# Patient Record
Sex: MALE | Race: WHITE | Employment: OTHER | URBAN - METROPOLITAN AREA
[De-identification: names, ages, dates, MRNs, and addresses within clinical notes are randomized per-mention and may not be internally consistent; named-entity substitution may affect disease eponyms.]

---

## 2017-09-01 PROBLEM — F17.200 SMOKING: Status: ACTIVE | Noted: 2017-09-01

## 2018-04-02 ENCOUNTER — HOSPITAL ENCOUNTER (OUTPATIENT)
Dept: PHYSICAL THERAPY | Age: 68
End: 2018-04-02
Attending: INTERNAL MEDICINE

## 2019-04-02 ENCOUNTER — HOSPITAL ENCOUNTER (OUTPATIENT)
Dept: GENERAL RADIOLOGY | Age: 69
Discharge: HOME | End: 2019-04-02
Attending: INTERNAL MEDICINE
Payer: MEDICARE

## 2019-04-02 PROCEDURE — 73030 X-RAY EXAM OF SHOULDER: CPT

## 2019-08-26 PROBLEM — Z72.0 TOBACCO ABUSE: Status: ACTIVE | Noted: 2017-09-01

## 2019-08-26 PROBLEM — Z79.899 LONG-TERM USE OF HIGH-RISK MEDICATION: Status: ACTIVE | Noted: 2019-08-26

## 2019-08-26 PROBLEM — E78.5 DYSLIPIDEMIA: Status: ACTIVE | Noted: 2019-08-26

## 2019-08-27 PROBLEM — M54.50 CHRONIC BILATERAL LOW BACK PAIN WITHOUT SCIATICA: Status: ACTIVE | Noted: 2019-08-27

## 2019-08-27 PROBLEM — Z85.850 HISTORY OF THYROID CANCER: Status: ACTIVE | Noted: 2019-08-27

## 2019-08-27 PROBLEM — G89.29 CHRONIC BILATERAL LOW BACK PAIN WITHOUT SCIATICA: Status: ACTIVE | Noted: 2019-08-27

## 2019-08-28 PROBLEM — Z86.010 HISTORY OF ADENOMATOUS POLYP OF COLON: Status: ACTIVE | Noted: 2019-08-28

## 2019-08-28 PROBLEM — F33.1 MODERATE EPISODE OF RECURRENT MAJOR DEPRESSIVE DISORDER (HCC): Status: ACTIVE | Noted: 2019-08-28

## 2019-08-28 PROBLEM — R31.0 GROSS HEMATURIA: Status: ACTIVE | Noted: 2019-08-28

## 2019-09-05 PROBLEM — M51.36 DDD (DEGENERATIVE DISC DISEASE), LUMBAR: Status: ACTIVE | Noted: 2019-09-05

## 2019-09-06 ENCOUNTER — HOSPITAL ENCOUNTER (OUTPATIENT)
Dept: ULTRASOUND IMAGING | Age: 69
Discharge: HOME | End: 2019-09-06
Attending: FAMILY MEDICINE
Payer: MEDICARE

## 2019-09-06 DIAGNOSIS — Z13.6 SCREENING FOR AAA (ABDOMINAL AORTIC ANEURYSM): ICD-10-CM

## 2019-09-06 PROCEDURE — 93978 VASCULAR STUDY: CPT

## 2019-09-12 ENCOUNTER — HOSPITAL ENCOUNTER (OUTPATIENT)
Dept: MRI IMAGING | Age: 69
Discharge: HOME | End: 2019-09-12
Attending: ORTHOPAEDIC SURGERY
Payer: MEDICARE

## 2019-09-12 DIAGNOSIS — M25.511 PAIN IN RIGHT SHOULDER: ICD-10-CM

## 2019-09-12 PROCEDURE — 73221 MRI JOINT UPR EXTREM W/O DYE: CPT

## 2019-10-05 PROBLEM — F33.0 MILD EPISODE OF RECURRENT MAJOR DEPRESSIVE DISORDER (HCC): Status: ACTIVE | Noted: 2019-08-28

## 2019-11-04 ENCOUNTER — HOSPITAL ENCOUNTER (OUTPATIENT)
Dept: LAB | Age: 69
Discharge: HOME | End: 2019-11-04

## 2019-11-04 PROCEDURE — 88305 TISSUE EXAM BY PATHOLOGIST: CPT

## 2020-08-31 PROBLEM — Z79.899 LONG-TERM USE OF HIGH-RISK MEDICATION: Status: RESOLVED | Noted: 2019-08-26 | Resolved: 2020-08-31

## 2020-08-31 PROBLEM — R09.89 BRUIT OF LEFT CAROTID ARTERY: Status: ACTIVE | Noted: 2020-08-31

## 2020-08-31 PROBLEM — C67.9 MALIGNANT NEOPLASM OF URINARY BLADDER (HCC): Status: ACTIVE | Noted: 2019-11-14

## 2020-08-31 PROBLEM — R53.1 WEAKNESS GENERALIZED: Status: ACTIVE | Noted: 2020-08-31

## 2020-08-31 PROBLEM — D49.4 BLADDER TUMOR: Status: ACTIVE | Noted: 2019-11-14

## 2020-08-31 PROBLEM — R31.0 GROSS HEMATURIA: Status: RESOLVED | Noted: 2019-08-28 | Resolved: 2020-08-31

## 2021-02-21 PROBLEM — M79.642 PAIN IN BOTH HANDS: Status: ACTIVE | Noted: 2021-02-21

## 2021-02-21 PROBLEM — Z28.21 REFUSED INFLUENZA VACCINE: Status: ACTIVE | Noted: 2021-02-21

## 2021-02-21 PROBLEM — M79.641 PAIN IN BOTH HANDS: Status: ACTIVE | Noted: 2021-02-21

## 2021-02-21 PROBLEM — G89.29 CHRONIC RIGHT SHOULDER PAIN: Status: ACTIVE | Noted: 2021-02-21

## 2021-02-21 PROBLEM — M25.511 CHRONIC RIGHT SHOULDER PAIN: Status: ACTIVE | Noted: 2021-02-21

## 2021-03-17 ENCOUNTER — HOSPITAL ENCOUNTER (OUTPATIENT)
Dept: SURGERY | Age: 71
Discharge: HOME | End: 2021-03-17
Attending: ORTHOPAEDIC SURGERY
Payer: MEDICARE

## 2021-03-17 VITALS — OXYGEN SATURATION: 96 %

## 2021-03-17 LAB
ALBUMIN SERPL-MCNC: 4.1 G/DL (ref 3.2–4.6)
ALBUMIN/GLOB SERPL: 1.2 {RATIO} (ref 1.2–3.5)
ALP SERPL-CCNC: 69 U/L (ref 50–136)
ALT SERPL-CCNC: 23 U/L (ref 12–65)
ANION GAP SERPL CALC-SCNC: 1 MMOL/L (ref 7–16)
APPEARANCE UR: CLEAR
APTT PPP: 27.1 SEC (ref 24.1–35.1)
AST SERPL-CCNC: 15 U/L (ref 15–37)
BACTERIA SPEC CULT: NORMAL
BACTERIA URNS QL MICRO: 0 /HPF
BILIRUB SERPL-MCNC: 0.4 MG/DL (ref 0.2–1.1)
BILIRUB UR QL: NEGATIVE
BUN SERPL-MCNC: 16 MG/DL (ref 8–23)
CALCIUM SERPL-MCNC: 9.1 MG/DL (ref 8.3–10.4)
CASTS URNS QL MICRO: 0 /LPF
CHLORIDE SERPL-SCNC: 103 MMOL/L (ref 98–107)
CO2 SERPL-SCNC: 32 MMOL/L (ref 21–32)
COLOR UR: YELLOW
CREAT SERPL-MCNC: 0.97 MG/DL (ref 0.8–1.5)
EPI CELLS #/AREA URNS HPF: 0 /HPF
ERYTHROCYTE [DISTWIDTH] IN BLOOD BY AUTOMATED COUNT: 13.5 % (ref 11.9–14.6)
GLOBULIN SER CALC-MCNC: 3.3 G/DL (ref 2.3–3.5)
GLUCOSE SERPL-MCNC: 99 MG/DL (ref 65–100)
GLUCOSE UR STRIP.AUTO-MCNC: NEGATIVE MG/DL
HCT VFR BLD AUTO: 39.4 % (ref 41.1–50.3)
HGB BLD-MCNC: 13.2 G/DL (ref 13.6–17.2)
HGB UR QL STRIP: ABNORMAL
INR PPP: 1
KETONES UR QL STRIP.AUTO: NEGATIVE MG/DL
LEUKOCYTE ESTERASE UR QL STRIP.AUTO: NEGATIVE
MAGNESIUM SERPL-MCNC: 2.4 MG/DL (ref 1.8–2.4)
MCH RBC QN AUTO: 31.1 PG (ref 26.1–32.9)
MCHC RBC AUTO-ENTMCNC: 33.5 G/DL (ref 31.4–35)
MCV RBC AUTO: 92.7 FL (ref 79.6–97.8)
NITRITE UR QL STRIP.AUTO: NEGATIVE
NRBC # BLD: 0 K/UL (ref 0–0.2)
PH UR STRIP: 5.5 [PH] (ref 5–9)
PLATELET # BLD AUTO: 377 K/UL (ref 150–450)
PMV BLD AUTO: 8.9 FL (ref 9.4–12.3)
POTASSIUM SERPL-SCNC: 4.2 MMOL/L (ref 3.5–5.1)
PROT SERPL-MCNC: 7.4 G/DL (ref 6.3–8.2)
PROT UR STRIP-MCNC: NEGATIVE MG/DL
PROTHROMBIN TIME: 13.5 SEC (ref 12.5–14.7)
RBC # BLD AUTO: 4.25 M/UL (ref 4.23–5.6)
RBC #/AREA URNS HPF: ABNORMAL /HPF
SERVICE CMNT-IMP: NORMAL
SODIUM SERPL-SCNC: 136 MMOL/L (ref 136–145)
SP GR UR REFRACTOMETRY: 1.01 (ref 1–1.02)
UROBILINOGEN UR QL STRIP.AUTO: 0.2 EU/DL (ref 0.2–1)
WBC # BLD AUTO: 8.4 K/UL (ref 4.3–11.1)
WBC URNS QL MICRO: 0 /HPF

## 2021-03-17 PROCEDURE — 81001 URINALYSIS AUTO W/SCOPE: CPT

## 2021-03-17 PROCEDURE — 36415 COLL VENOUS BLD VENIPUNCTURE: CPT

## 2021-03-17 PROCEDURE — 85027 COMPLETE CBC AUTOMATED: CPT

## 2021-03-17 PROCEDURE — 85730 THROMBOPLASTIN TIME PARTIAL: CPT

## 2021-03-17 PROCEDURE — 83735 ASSAY OF MAGNESIUM: CPT

## 2021-03-17 PROCEDURE — 80053 COMPREHEN METABOLIC PANEL: CPT

## 2021-03-17 PROCEDURE — 85610 PROTHROMBIN TIME: CPT

## 2021-03-17 PROCEDURE — 87641 MR-STAPH DNA AMP PROBE: CPT

## 2021-03-17 RX ORDER — SODIUM CHLORIDE 0.9 % (FLUSH) 0.9 %
5-40 SYRINGE (ML) INJECTION EVERY 8 HOURS
Status: CANCELLED | OUTPATIENT
Start: 2021-03-17

## 2021-03-17 RX ORDER — SODIUM CHLORIDE 0.9 % (FLUSH) 0.9 %
5-40 SYRINGE (ML) INJECTION AS NEEDED
Status: CANCELLED | OUTPATIENT
Start: 2021-03-17

## 2021-03-17 NOTE — PERIOP NOTES
Pt did not show for 1330 appointment. Spoke with wife and she states he is near the building. Spoke with charge nurse and pt will have labs drawn today. Assessment will be done as a phone assessment.

## 2021-03-17 NOTE — PERIOP NOTES
Pt showed for 1330 appt at 1440. Informed pt that he is too late for assessment however, we will draw his pre op labs, obtain nasal swab and have RT evalate while he is here. ( Patient is also scheduled for COVID test before 4 pm today). Instructed pt that he will receive a phone call tomorrow morning from a Pre Admission testing RN. All of his medical/surgical history and medications will be reviewed. Provided pt with completed Patient Guide to Surgery, Pre Surgery Soap instructions, Incentive Spirometer w/written instructions. Informed pt that RN will review all of these written handouts with him during his phone assessment tomorrow. This RN obtained MSSA nasal swab, labeled and sent to lab. This RN escorted pt the RT office and informed RT to route pt to out patient lab next.

## 2021-03-17 NOTE — PROGRESS NOTES
03/17/21 1445   Oxygen Therapy   O2 Sat (%) 96 %   Pulse via Oximetry 61 beats per minute   O2 Device Room air   Pre-Treatment   Breath Sounds Bilateral Clear   Pre FEV1 (liters) 3.4 liters   % Predicted 121  (PT IS CURRENTSMOKER)     Initial respiratory Assessment completed with pt. Pt was interviewed and evaluated in Joint camp prior to surgery. Patient ID:  Cathy Menezes  [id number]  37 y.o.  1950  Surgeon: Dr. Floridalma Pathak  Date of Surgery: 3/25/2021  Procedure: Total Right Shoulder Arthroplasty  Primary Care Physician: Prachi Salas [phone]  Specialists:     Pt. IS SOMEWHAT PRACTICING SOCIAL DISTANCING AND WEARING A MASK WHEN IN PUBLIC.     Pt taught proper COUGH technique  DIAPHRAGMATIC BREATHING EXERCISE INSTRUCTIONS GIVEN    History of smoking:   SMOKES 1 JOINT DAILY  HX OF SMOKING 2-3 PPD BUT NOW DOWN TO (1/2 PPD- PAST 3 MONTHS) FOR PAST 62 YEARS                 Quit date:         Secondhand smoke:FATHER  SMOKING CESSATION INFO HANDED TO PT  Past procedures with Oxygen desaturation or delayed awakening:DENIES    Past Medical History:   Diagnosis Date    Arthritis     Arthritis, degenerative 9/28/2016    Chronic gastritis 9/28/2016    COPD (chronic obstructive pulmonary disease) ([facility] 9/28/2016    HTN (hypertension) 9/28/2016    Hypercholesterolemia     Hypothyroidism 9/28/2016    Long-term use of high-risk medication 8/26/2019    Osteopenia 9/28/2016    Other ill-defined conditions(799.89)     chronic back pain    Prostate cancer ([facility] 9/28/2016    stable - no recurrence - recent PSA normal    PT STATES SH DRINKS 2-3 BEERS DAILY AND SOMETIMES A WHOLE CASE  DENIES COPD  11/26/2019 CHEST CT SHOWED MILD GROUND GLASS  APPEARANCE OR ATELECTASIS IN BILATERAL LOWER LOBES  Respiratory history:DENIES SOB                                                                  Respiratory meds:  DENIES    FAMILY PRESENT:             NO                                                   PAST SLEEP STUDY:                           DENIES  HX OF LEILA:                                        DENIES  LEILA assessment:                                               SLEEPS ON    BACK           PHYSICAL EXAM   There is no height or weight on file to calculate BMI.    Visit Vitals  SpO2 (P) 96%     Neck circumference:   37   cm    Loud snoring:                                                 YES             Witnessed apnea or wakening gasping or choking:        DENIES        Awakens with headaches:                                               DENIES  Morning or daytime tiredness/ sleepiness:                      DENIES           Dry mouth or sore throat in morning:            YES                                               [place] stage:  3-4                                   SACS score:4  Stop Bang   STOP-BANG  Does the patient snore loudly (louder than talking or loud enough to be heard through closed doors)?: Yes  Does the patient often feel tired, fatigued, or sleepy during the daytime, even after a \"good\" night's sleep?: No  Has anyone ever observed the patient stop breathing during their sleep? : No  Does the patient have or are they being treated for high blood pressure?: No  Is the patient's BMI greater than 35?: No  Is your neck circumference greater than 17 inches (Male) or 16 inches (Female)?: No  Is the patient older than 48?: Yes  Is the patient male?: Yes  LEILA Score: 3  Has the patient been referred to Sleep Medicine?: No  Has the patient previously been diagnosed with Obstructive Sleep Apnea?: No                            CONTINUOUS SAT MONITOR UPON ARRIVAL TO ROOM                                        Referrals:    Pt. Phone Number:

## 2021-03-17 NOTE — ADVANCED PRACTICE NURSE
Patient late for PAT appointment. Will have RT assessment due to untreated COPD and smoking today after lab work.

## 2021-03-20 PROBLEM — M12.811 ROTATOR CUFF TEAR ARTHROPATHY OF RIGHT SHOULDER: Status: ACTIVE | Noted: 2021-03-20

## 2021-03-20 PROBLEM — M75.101 ROTATOR CUFF TEAR ARTHROPATHY OF RIGHT SHOULDER: Status: ACTIVE | Noted: 2021-03-20

## 2021-03-20 NOTE — H&P
[facility] HISTORY AND PHYSICAL    Subjective:     Patient is a 69 y.o. RHD MALE WITH RIGHT SHOULDER PAIN. SEE OFFICE NOTE. Patient Active Problem List    Diagnosis Date Noted    Rotator cuff tear arthropathy of right shoulder 03/20/2021    Refused influenza vaccine 02/21/2021    Pain in both hands 02/21/2021    Chronic right shoulder pain 02/21/2021    Bruit of left carotid artery 08/31/2020    Weakness generalized 08/31/2020    Malignant neoplasm of urinary bladder ([address] 11/14/2019    DDD (degenerative disc disease), lumbar 09/05/2019    Mild episode of recurrent major depressive disorder ([address] 08/28/2019    Chronic bilateral low back pain without sciatica 08/27/2019    History of thyroid cancer 08/27/2019    Dyslipidemia 08/26/2019    High risk medication use 08/26/2019    Tobacco abuse 09/01/2017    Chronic gastritis 09/28/2016    Prostate cancer ([address] 09/28/2016    Osteopenia 09/28/2016    Erectile dysfunction following radical prostatectomy 12/22/2015    History of adenomatous polyp of colon 04/30/2015     Past Medical History:   Diagnosis Date    Arthritis     Arthritis, degenerative 9/28/2016    Chronic gastritis 9/28/2016    COPD (chronic obstructive pulmonary disease) ([address] 9/28/2016    HTN (hypertension) 9/28/2016    Hypercholesterolemia     Hypothyroidism 9/28/2016    Long-term use of high-risk medication 8/26/2019    Osteopenia 9/28/2016    Other ill-defined conditions(799.89)     chronic back pain    Prostate cancer ([address] 9/28/2016    stable - no recurrence - recent PSA normal      Past Surgical History:   Procedure Laterality Date    HX HERNIA REPAIR      HX PARTIAL THYROIDECTOMY Left     HX PROSTATECTOMY        Prior to Admission medications    Medication Sig Start Date End Date Taking? Authorizing Provider   oxybutynin (DITROPAN) 5 mg tablet Take 5 mg by mouth as needed for Hemostasis.  12/29/20   Provider, Historical   loratadine (CLARITIN) 10 mg tablet Take 10 mg by mouth daily. Provider, Historical   celecoxib (CELEBREX) 200 mg capsule Take 1 Cap by mouth daily. 2/9/21   Angel Mason DO   pantoprazole (Protonix) 40 mg tablet Take 1 Tab by mouth daily. Indications: gastroesophageal reflux disease 8/31/20   Jarold Severe P, DO   atorvastatin (LIPITOR) 10 mg tablet Take 1 Tab by mouth daily. 8/31/20   Angel Mason DO     No Known Allergies   Social History     Tobacco Use    Smoking status: Current Every Day Smoker     Packs/day: 0.50     Types: Cigarettes     Start date: 1/1/1963    Smokeless tobacco: Never Used   Substance Use Topics    Alcohol use: Yes     Alcohol/week: 14.0 standard drinks     Types: 14 Cans of beer per week     Frequency: 4 or more times a week     Drinks per session: 1 or 2     Binge frequency: Never      Family History   Problem Relation Age of Onset    Cancer Brother       Review of Systems  A comprehensive review of systems was negative except for that written in the HPI. Objective:     No data found. There were no vitals taken for this visit. General:  Alert, cooperative, no distress, appears stated age. Head:  Normocephalic, without obvious abnormality, atraumatic. Back:   Symmetric, no curvature. ROM normal. No CVA tenderness. Lungs:   Clear to auscultation bilaterally. Chest wall:  No tenderness or deformity. Heart:  Regular rate and rhythm, S1, S2 normal, no murmur, click, rub or gallop. Extremities: Extremities normal, atraumatic, no cyanosis or edema. Pulses: 2+ and symmetric all extremities. Skin: Skin color, texture, turgor normal. No rashes or lesions   Lymph nodes: Cervical, supraclavicular, and axillary nodes normal.   Neurologic: CNII-XII intact. Normal strength, sensation and reflexes throughout.            Assessment:     Principal Problem:    Rotator cuff tear arthropathy of right shoulder (3/20/2021)        Plan:     The various methods of treatment have been discussed with the patient and family. PATIENT HAS EXHAUSTED NON-OPERATIVE MODALITIES     After consideration of risks, benefits and other options for treatment, the patient has consented to surgical intervention.     SEE OFFICE NOTE    Lorice Kocher., MD

## 2021-03-22 NOTE — ADVANCED PRACTICE NURSE
Total Joint Surgery Preoperative Chart Review      Patient ID:  Brody Juarez  [id number]  67 y.o.  1950  Surgeon: Dr. Rosario Spearing  Date of Surgery: 3/25/2021  Procedure: Total Right Shoulder Arthroplasty  Primary Care Physician: Morelia Shay, [place] [phone]  Specialty Physician(s):      Subjective:   Brody Juarez is a 69 y.o. WHITE male who presents for preoperative evaluation for Total Right Shoulder arthroplasty. This is a preoperative chart review note based on data collected by the nurse at the surgical Pre-Assessment visit. Past Medical History:   Diagnosis Date    Arthritis     Arthritis, degenerative 9/28/2016    Chronic gastritis 9/28/2016    COPD (chronic obstructive pulmonary disease) ([facility] 9/28/2016    HTN (hypertension) 9/28/2016    Hypercholesterolemia     Hypothyroidism 9/28/2016    Long-term use of high-risk medication 8/26/2019    Osteopenia 9/28/2016    Other ill-defined conditions(799.89)     chronic back pain    Prostate cancer ([facility] [address] 9/28/2016    stable - no recurrence - recent PSA normal      Past Surgical History:   Procedure Laterality Date    HX HERNIA REPAIR      HX PARTIAL THYROIDECTOMY Left     HX PROSTATECTOMY       Family History   Problem Relation Age of Onset    Cancer Brother       Social History     Tobacco Use    Smoking status: Current Every Day Smoker     Packs/day: 0.50     Types: Cigarettes     Start date: 1/1/1963    Smokeless tobacco: Never Used   Substance Use Topics    Alcohol use: Yes     Alcohol/week: 14.0 standard drinks     Types: 14 Cans of beer per week     Frequency: 4 or more times a week     Drinks per session: 1 or 2     Binge frequency: Never       Prior to Admission medications    Medication Sig Start Date End Date Taking? Authorizing Provider   oxybutynin (DITROPAN) 5 mg tablet Take 5 mg by mouth as needed for Hemostasis. 12/29/20   Provider, Historical   loratadine (CLARITIN) 10 mg tablet Take 10 mg by mouth daily. Provider, Historical   celecoxib (CELEBREX) 200 mg capsule Take 1 Cap by mouth daily. 2/9/21   [place] Ill, DO   pantoprazole (Protonix) 40 mg tablet Take 1 Tab by mouth daily. Indications: gastroesophageal reflux disease 8/31/20   Camilla Bryson P, DO   atorvastatin (LIPITOR) 10 mg tablet Take 1 Tab by mouth daily. 8/31/20   [place] Ill, DO     No Known Allergies       Objective:     Physical Exam:   No data found. ECG:    EKG Results     None          Data Review:   Labs:   Labs reviewed    Problem List:  )  Patient Active Problem List   Diagnosis Code    Chronic gastritis K29.50    Pro[facility] ([facility] C61    Osteopenia M85.80    Tobacco abuse Z72.0    Dyslipidemia E78.5    High risk medication use Z79.899    Chronic bilateral low back pain without sciatica M54.5, G89.29    History of thyroid cancer Z85.850    History of adenomatous polyp of colon Z86.010    Mild episode of recurrent major depressive disorder ([facility] F33.0    DDD (degenerative disc disease), lumbar M51.36    Erectile dysfunction following radical prostatectomy N52.31    Malignant neoplasm of urinary bladder ([facility]) C67.9    Bruit of left carotid artery R09.89    Weakness generalized R53.1    Refused influenza vaccine Z28.21    Pain in both hands M79.641, M79.642    Chronic right shoulder pain M25.511, G89.29    Rotator cuff tear arthropathy of right shoulder M75.101, M12.811       Total Joint Surgery Pre-Assessment Recommendations:           Recommend continuous saturation monitoring during hospitalization. PEP therapy BID. Albuterol every 6 hours as need during hospitalization.      Signed By: MEL Walls    March 22, 2021

## 2021-03-24 ENCOUNTER — ANESTHESIA EVENT (OUTPATIENT)
Dept: SURGERY | Age: 71
End: 2021-03-24
Payer: MEDICARE

## 2021-03-24 ENCOUNTER — HOSPITAL ENCOUNTER (OUTPATIENT)
Dept: LAB | Age: 71
Discharge: HOME | End: 2021-03-24
Payer: MEDICARE

## 2021-03-24 PROCEDURE — 86901 BLOOD TYPING SEROLOGIC RH(D): CPT

## 2021-03-24 PROCEDURE — 36415 COLL VENOUS BLD VENIPUNCTURE: CPT

## 2021-03-24 PROCEDURE — 86923 COMPATIBILITY TEST ELECTRIC: CPT

## 2021-03-25 ENCOUNTER — ANESTHESIA (OUTPATIENT)
Dept: SURGERY | Age: 71
End: 2021-03-25
Payer: MEDICARE

## 2021-03-25 ENCOUNTER — HOSPITAL ENCOUNTER (OUTPATIENT)
Age: 71
Setting detail: OBSERVATION
Discharge: HOME | End: 2021-03-26
Attending: ORTHOPAEDIC SURGERY | Admitting: ORTHOPAEDIC SURGERY
Payer: MEDICARE

## 2021-03-25 ENCOUNTER — APPOINTMENT (OUTPATIENT)
Dept: GENERAL RADIOLOGY | Age: 71
End: 2021-03-25
Attending: ORTHOPAEDIC SURGERY
Payer: MEDICARE

## 2021-03-25 DIAGNOSIS — M75.101 ROTATOR CUFF TEAR ARTHROPATHY OF RIGHT SHOULDER: ICD-10-CM

## 2021-03-25 DIAGNOSIS — M12.811 ROTATOR CUFF TEAR ARTHROPATHY OF RIGHT SHOULDER: ICD-10-CM

## 2021-03-25 LAB
EST. AVERAGE GLUCOSE BLD GHB EST-MCNC: 114 MG/DL
GLUCOSE BLD STRIP.AUTO-MCNC: 98 MG/DL (ref 65–100)
HBA1C MFR BLD: 5.6 % (ref 4.2–6.3)
HISTORY CHECKED?,CKHIST: NORMAL
SERVICE CMNT-IMP: NORMAL

## 2021-03-25 PROCEDURE — 77030004434 HC BUR RND STRY -B: Performed by: ORTHOPAEDIC SURGERY

## 2021-03-25 PROCEDURE — 77030003827 HC BIT DRL J&J -B: Performed by: ORTHOPAEDIC SURGERY

## 2021-03-25 PROCEDURE — 76010010054 HC POST OP PAIN BLOCK: Performed by: ORTHOPAEDIC SURGERY

## 2021-03-25 PROCEDURE — 77030002986 HC SUT PROL J&J -A: Performed by: ORTHOPAEDIC SURGERY

## 2021-03-25 PROCEDURE — 2709999900 HC NON-CHARGEABLE SUPPLY

## 2021-03-25 PROCEDURE — C1776 JOINT DEVICE (IMPLANTABLE): HCPCS | Performed by: ORTHOPAEDIC SURGERY

## 2021-03-25 PROCEDURE — 74011250636 HC RX REV CODE- 250/636: Performed by: ANESTHESIOLOGY

## 2021-03-25 PROCEDURE — 82962 GLUCOSE BLOOD TEST: CPT

## 2021-03-25 PROCEDURE — 77030037088 HC TUBE ENDOTRACH ORAL NSL COVD-A: Performed by: ANESTHESIOLOGY

## 2021-03-25 PROCEDURE — 77030012547: Performed by: ORTHOPAEDIC SURGERY

## 2021-03-25 PROCEDURE — 76060000034 HC ANESTHESIA 1.5 TO 2 HR: Performed by: ORTHOPAEDIC SURGERY

## 2021-03-25 PROCEDURE — 74011250636 HC RX REV CODE- 250/636: Performed by: NURSE ANESTHETIST, CERTIFIED REGISTERED

## 2021-03-25 PROCEDURE — 99218 HC RM OBSERVATION: CPT

## 2021-03-25 PROCEDURE — 74011250636 HC RX REV CODE- 250/636

## 2021-03-25 PROCEDURE — 23472 RECONSTRUCT SHOULDER JOINT: CPT | Performed by: ORTHOPAEDIC SURGERY

## 2021-03-25 PROCEDURE — 77030020268 HC MISC GENERAL SUPPLY: Performed by: ORTHOPAEDIC SURGERY

## 2021-03-25 PROCEDURE — 76210000006 HC OR PH I REC 0.5 TO 1 HR: Performed by: ORTHOPAEDIC SURGERY

## 2021-03-25 PROCEDURE — 77030039425 HC BLD LARYNG TRULITE DISP TELE -A: Performed by: ANESTHESIOLOGY

## 2021-03-25 PROCEDURE — C1713 ANCHOR/SCREW BN/BN,TIS/BN: HCPCS | Performed by: ORTHOPAEDIC SURGERY

## 2021-03-25 PROCEDURE — 94762 N-INVAS EAR/PLS OXIMTRY CONT: CPT

## 2021-03-25 PROCEDURE — 76942 ECHO GUIDE FOR BIOPSY: CPT | Performed by: ORTHOPAEDIC SURGERY

## 2021-03-25 PROCEDURE — 77030003602 HC NDL NRV BLK BBMI -B: Performed by: ANESTHESIOLOGY

## 2021-03-25 PROCEDURE — 73030 X-RAY EXAM OF SHOULDER: CPT

## 2021-03-25 PROCEDURE — 77030035257 HC SUT FORC FBR STRND STRY -B: Performed by: ORTHOPAEDIC SURGERY

## 2021-03-25 PROCEDURE — 77030040922 HC BLNKT HYPOTHRM STRY -A: Performed by: ANESTHESIOLOGY

## 2021-03-25 PROCEDURE — 77030035643 HC BLD SAW OSC PRECIS STRY -C: Performed by: ORTHOPAEDIC SURGERY

## 2021-03-25 PROCEDURE — 76010000162 HC OR TIME 1.5 TO 2 HR INTENSV-TIER 1: Performed by: ORTHOPAEDIC SURGERY

## 2021-03-25 PROCEDURE — 83036 HEMOGLOBIN GLYCOSYLATED A1C: CPT

## 2021-03-25 PROCEDURE — 77030031139 HC SUT VCRL2 J&J -A: Performed by: ORTHOPAEDIC SURGERY

## 2021-03-25 PROCEDURE — 77030011283 HC ELECTRD NDL COVD -A: Performed by: ORTHOPAEDIC SURGERY

## 2021-03-25 PROCEDURE — 2709999900 HC NON-CHARGEABLE SUPPLY: Performed by: ORTHOPAEDIC SURGERY

## 2021-03-25 PROCEDURE — 23397 MUSCLE TRANSFERS: CPT | Performed by: ORTHOPAEDIC SURGERY

## 2021-03-25 PROCEDURE — 74011000250 HC RX REV CODE- 250: Performed by: NURSE ANESTHETIST, CERTIFIED REGISTERED

## 2021-03-25 PROCEDURE — 74011250637 HC RX REV CODE- 250/637: Performed by: ORTHOPAEDIC SURGERY

## 2021-03-25 PROCEDURE — 74011000250 HC RX REV CODE- 250

## 2021-03-25 PROCEDURE — 77030002913 HC SUT ETHBND J&J -B: Performed by: ORTHOPAEDIC SURGERY

## 2021-03-25 PROCEDURE — 77030002937 HC SUT MERS J&J -B: Performed by: ORTHOPAEDIC SURGERY

## 2021-03-25 DEVICE — COMPONENT GLEN FIX DIA10MM SHLDR METAGLENE LNG PEG GLOB: Type: IMPLANTABLE DEVICE | Site: SHOULDER | Status: FUNCTIONAL

## 2021-03-25 DEVICE — Z DUP USE 2513000 COMPONENT GLENOSPHERE ECC XTEND 38MM + 4MM: Type: IMPLANTABLE DEVICE | Site: SHOULDER | Status: FUNCTIONAL

## 2021-03-25 DEVICE — RESTRICTOR CEM DIA12MM UNIV FEM CNL UHMWPE BIOSTP G: Type: IMPLANTABLE DEVICE | Site: SHOULDER | Status: FUNCTIONAL

## 2021-03-25 DEVICE — CEMENT BNE 20ML 41GM FULL DOSE PMMA W/ TOBRA M VISC RADPQ: Type: IMPLANTABLE DEVICE | Site: SHOULDER | Status: FUNCTIONAL

## 2021-03-25 DEVICE — SPACER HUM +9MM OFFSET SHLDR POLYETH FOR DELT XTEND REV SYS
Type: IMPLANTABLE DEVICE | Site: SHOULDER | Status: NON-FUNCTIONAL
Removed: 2021-09-09

## 2021-03-25 DEVICE — CUP HUM DIA38MM +3MM OFFSET STD SHLDR POLYETH DELT XTEND: Type: IMPLANTABLE DEVICE | Site: SHOULDER | Status: FUNCTIONAL

## 2021-03-25 DEVICE — SCREW BNE L26MM DIA4.5MM PROX CORT TIB S STL ST LOK FULL: Type: IMPLANTABLE DEVICE | Site: SHOULDER | Status: FUNCTIONAL

## 2021-03-25 DEVICE — SCREW BNE L36MM DIA4.5MM PROX CORT TIB S STL ST LOK FULL: Type: IMPLANTABLE DEVICE | Site: SHOULDER | Status: FUNCTIONAL

## 2021-03-25 DEVICE — STEM HUM SZ 2 DIA12MM 155DEG SHLDR CO CHROM ALLY STD LEN: Type: IMPLANTABLE DEVICE | Site: SHOULDER | Status: FUNCTIONAL

## 2021-03-25 DEVICE — SCREW BNE L42MM DIA4.5MM PROX CORT TIB S STL ST LOK FULL: Type: IMPLANTABLE DEVICE | Site: SHOULDER | Status: FUNCTIONAL

## 2021-03-25 RX ORDER — PANTOPRAZOLE SODIUM 40 MG/1
40 TABLET, DELAYED RELEASE ORAL DAILY
Status: DISCONTINUED | OUTPATIENT
Start: 2021-03-26 | End: 2021-03-26 | Stop reason: HOSPADM

## 2021-03-25 RX ORDER — ONDANSETRON 2 MG/ML
4 INJECTION INTRAMUSCULAR; INTRAVENOUS ONCE
Status: DISCONTINUED | OUTPATIENT
Start: 2021-03-25 | End: 2021-03-25 | Stop reason: HOSPADM

## 2021-03-25 RX ORDER — LIDOCAINE HYDROCHLORIDE 10 MG/ML
0.1 INJECTION INFILTRATION; PERINEURAL AS NEEDED
Status: DISCONTINUED | OUTPATIENT
Start: 2021-03-25 | End: 2021-03-25 | Stop reason: HOSPADM

## 2021-03-25 RX ORDER — HYDROMORPHONE HYDROCHLORIDE 2 MG/1
2 TABLET ORAL
Status: DISCONTINUED | OUTPATIENT
Start: 2021-03-25 | End: 2021-03-26 | Stop reason: HOSPADM

## 2021-03-25 RX ORDER — OXYCODONE HYDROCHLORIDE 5 MG/1
5 TABLET ORAL
Status: DISCONTINUED | OUTPATIENT
Start: 2021-03-25 | End: 2021-03-25 | Stop reason: HOSPADM

## 2021-03-25 RX ORDER — SODIUM CHLORIDE 0.9 % (FLUSH) 0.9 %
5-40 SYRINGE (ML) INJECTION EVERY 8 HOURS
Status: DISCONTINUED | OUTPATIENT
Start: 2021-03-25 | End: 2021-03-26 | Stop reason: HOSPADM

## 2021-03-25 RX ORDER — ONDANSETRON 2 MG/ML
INJECTION INTRAMUSCULAR; INTRAVENOUS AS NEEDED
Status: DISCONTINUED | OUTPATIENT
Start: 2021-03-25 | End: 2021-03-25 | Stop reason: HOSPADM

## 2021-03-25 RX ORDER — CEFAZOLIN SODIUM/WATER 2 G/20 ML
SYRINGE (ML) INTRAVENOUS AS NEEDED
Status: DISCONTINUED | OUTPATIENT
Start: 2021-03-25 | End: 2021-03-25 | Stop reason: HOSPADM

## 2021-03-25 RX ORDER — HYDROMORPHONE HYDROCHLORIDE 2 MG/1
4 TABLET ORAL
Status: DISCONTINUED | OUTPATIENT
Start: 2021-03-25 | End: 2021-03-26 | Stop reason: HOSPADM

## 2021-03-25 RX ORDER — ALBUTEROL SULFATE 0.83 MG/ML
2.5 SOLUTION RESPIRATORY (INHALATION) AS NEEDED
Status: DISCONTINUED | OUTPATIENT
Start: 2021-03-25 | End: 2021-03-25 | Stop reason: HOSPADM

## 2021-03-25 RX ORDER — HYDROMORPHONE HYDROCHLORIDE 1 MG/ML
1 INJECTION, SOLUTION INTRAMUSCULAR; INTRAVENOUS; SUBCUTANEOUS
Status: DISCONTINUED | OUTPATIENT
Start: 2021-03-25 | End: 2021-03-26 | Stop reason: HOSPADM

## 2021-03-25 RX ORDER — NEOSTIGMINE METHYLSULFATE 1 MG/ML
INJECTION, SOLUTION INTRAVENOUS AS NEEDED
Status: DISCONTINUED | OUTPATIENT
Start: 2021-03-25 | End: 2021-03-25 | Stop reason: HOSPADM

## 2021-03-25 RX ORDER — SODIUM CHLORIDE 0.9 % (FLUSH) 0.9 %
5-40 SYRINGE (ML) INJECTION EVERY 8 HOURS
Status: DISCONTINUED | OUTPATIENT
Start: 2021-03-25 | End: 2021-03-25 | Stop reason: HOSPADM

## 2021-03-25 RX ORDER — ATORVASTATIN CALCIUM 10 MG/1
10 TABLET, FILM COATED ORAL DAILY
Status: DISCONTINUED | OUTPATIENT
Start: 2021-03-26 | End: 2021-03-26 | Stop reason: HOSPADM

## 2021-03-25 RX ORDER — DEXAMETHASONE SODIUM PHOSPHATE 4 MG/ML
INJECTION, SOLUTION INTRA-ARTICULAR; INTRALESIONAL; INTRAMUSCULAR; INTRAVENOUS; SOFT TISSUE AS NEEDED
Status: DISCONTINUED | OUTPATIENT
Start: 2021-03-25 | End: 2021-03-25 | Stop reason: HOSPADM

## 2021-03-25 RX ORDER — ALBUTEROL SULFATE 0.83 MG/ML
2.5 SOLUTION RESPIRATORY (INHALATION)
Status: DISCONTINUED | OUTPATIENT
Start: 2021-03-25 | End: 2021-03-26 | Stop reason: HOSPADM

## 2021-03-25 RX ORDER — ROPIVACAINE HYDROCHLORIDE 5 MG/ML
INJECTION, SOLUTION EPIDURAL; INFILTRATION; PERINEURAL AS NEEDED
Status: DISCONTINUED | OUTPATIENT
Start: 2021-03-25 | End: 2021-03-25 | Stop reason: HOSPADM

## 2021-03-25 RX ORDER — MIDAZOLAM HYDROCHLORIDE 1 MG/ML
2 INJECTION, SOLUTION INTRAMUSCULAR; INTRAVENOUS
Status: DISCONTINUED | OUTPATIENT
Start: 2021-03-25 | End: 2021-03-25 | Stop reason: HOSPADM

## 2021-03-25 RX ORDER — FACIAL-BODY WIPES
10 EACH TOPICAL DAILY PRN
Status: DISCONTINUED | OUTPATIENT
Start: 2021-03-25 | End: 2021-03-26 | Stop reason: HOSPADM

## 2021-03-25 RX ORDER — SODIUM CHLORIDE, SODIUM LACTATE, POTASSIUM CHLORIDE, CALCIUM CHLORIDE 600; 310; 30; 20 MG/100ML; MG/100ML; MG/100ML; MG/100ML
100 INJECTION, SOLUTION INTRAVENOUS CONTINUOUS
Status: DISCONTINUED | OUTPATIENT
Start: 2021-03-25 | End: 2021-03-25 | Stop reason: HOSPADM

## 2021-03-25 RX ORDER — PROPOFOL 10 MG/ML
INJECTION, EMULSION INTRAVENOUS AS NEEDED
Status: DISCONTINUED | OUTPATIENT
Start: 2021-03-25 | End: 2021-03-25 | Stop reason: HOSPADM

## 2021-03-25 RX ORDER — DOCUSATE SODIUM 100 MG/1
100 CAPSULE, LIQUID FILLED ORAL 2 TIMES DAILY
Status: DISCONTINUED | OUTPATIENT
Start: 2021-03-26 | End: 2021-03-26 | Stop reason: HOSPADM

## 2021-03-25 RX ORDER — FENTANYL CITRATE 50 UG/ML
100 INJECTION, SOLUTION INTRAMUSCULAR; INTRAVENOUS ONCE
Status: DISCONTINUED | OUTPATIENT
Start: 2021-03-25 | End: 2021-03-25 | Stop reason: HOSPADM

## 2021-03-25 RX ORDER — ROCURONIUM BROMIDE 10 MG/ML
INJECTION, SOLUTION INTRAVENOUS AS NEEDED
Status: DISCONTINUED | OUTPATIENT
Start: 2021-03-25 | End: 2021-03-25 | Stop reason: HOSPADM

## 2021-03-25 RX ORDER — NALOXONE HYDROCHLORIDE 0.4 MG/ML
0.1 INJECTION, SOLUTION INTRAMUSCULAR; INTRAVENOUS; SUBCUTANEOUS AS NEEDED
Status: DISCONTINUED | OUTPATIENT
Start: 2021-03-25 | End: 2021-03-25 | Stop reason: HOSPADM

## 2021-03-25 RX ORDER — PROMETHAZINE HYDROCHLORIDE 25 MG/1
25 TABLET ORAL
Status: DISCONTINUED | OUTPATIENT
Start: 2021-03-25 | End: 2021-03-26 | Stop reason: HOSPADM

## 2021-03-25 RX ORDER — MIDAZOLAM HYDROCHLORIDE 1 MG/ML
2 INJECTION, SOLUTION INTRAMUSCULAR; INTRAVENOUS ONCE
Status: COMPLETED | OUTPATIENT
Start: 2021-03-25 | End: 2021-03-25

## 2021-03-25 RX ORDER — LANOLIN ALCOHOL/MO/W.PET/CERES
1 CREAM (GRAM) TOPICAL 2 TIMES DAILY WITH MEALS
Status: DISCONTINUED | OUTPATIENT
Start: 2021-03-26 | End: 2021-03-26 | Stop reason: HOSPADM

## 2021-03-25 RX ORDER — SODIUM CHLORIDE 0.9 % (FLUSH) 0.9 %
5-40 SYRINGE (ML) INJECTION AS NEEDED
Status: DISCONTINUED | OUTPATIENT
Start: 2021-03-25 | End: 2021-03-25 | Stop reason: HOSPADM

## 2021-03-25 RX ORDER — HYDROMORPHONE HYDROCHLORIDE 2 MG/ML
0.5 INJECTION, SOLUTION INTRAMUSCULAR; INTRAVENOUS; SUBCUTANEOUS
Status: DISCONTINUED | OUTPATIENT
Start: 2021-03-25 | End: 2021-03-25 | Stop reason: HOSPADM

## 2021-03-25 RX ORDER — LIDOCAINE HYDROCHLORIDE 20 MG/ML
INJECTION, SOLUTION EPIDURAL; INFILTRATION; INTRACAUDAL; PERINEURAL AS NEEDED
Status: DISCONTINUED | OUTPATIENT
Start: 2021-03-25 | End: 2021-03-25 | Stop reason: HOSPADM

## 2021-03-25 RX ORDER — DIPHENHYDRAMINE HYDROCHLORIDE 50 MG/ML
12.5 INJECTION, SOLUTION INTRAMUSCULAR; INTRAVENOUS
Status: DISCONTINUED | OUTPATIENT
Start: 2021-03-25 | End: 2021-03-25 | Stop reason: HOSPADM

## 2021-03-25 RX ORDER — OXYCODONE HYDROCHLORIDE 5 MG/1
10 TABLET ORAL
Status: DISCONTINUED | OUTPATIENT
Start: 2021-03-25 | End: 2021-03-25 | Stop reason: HOSPADM

## 2021-03-25 RX ORDER — GLYCOPYRROLATE 0.2 MG/ML
INJECTION INTRAMUSCULAR; INTRAVENOUS AS NEEDED
Status: DISCONTINUED | OUTPATIENT
Start: 2021-03-25 | End: 2021-03-25 | Stop reason: HOSPADM

## 2021-03-25 RX ORDER — SODIUM CHLORIDE 9 MG/ML
75 INJECTION, SOLUTION INTRAVENOUS CONTINUOUS
Status: DISCONTINUED | OUTPATIENT
Start: 2021-03-25 | End: 2021-03-26 | Stop reason: HOSPADM

## 2021-03-25 RX ORDER — SODIUM CHLORIDE 0.9 % (FLUSH) 0.9 %
5-40 SYRINGE (ML) INJECTION AS NEEDED
Status: DISCONTINUED | OUTPATIENT
Start: 2021-03-25 | End: 2021-03-26 | Stop reason: HOSPADM

## 2021-03-25 RX ORDER — CEFAZOLIN SODIUM/WATER 2 G/20 ML
2 SYRINGE (ML) INTRAVENOUS ONCE
Status: COMPLETED | OUTPATIENT
Start: 2021-03-25 | End: 2021-03-25

## 2021-03-25 RX ADMIN — DEXAMETHASONE SODIUM PHOSPHATE 10 MG: 4 INJECTION, SOLUTION INTRAMUSCULAR; INTRAVENOUS at 17:13

## 2021-03-25 RX ADMIN — HYDROMORPHONE HYDROCHLORIDE 0.5 MG: 2 INJECTION INTRAMUSCULAR; INTRAVENOUS; SUBCUTANEOUS at 19:06

## 2021-03-25 RX ADMIN — CEFAZOLIN 2 G: 1 INJECTION, POWDER, FOR SOLUTION INTRAVENOUS at 16:55

## 2021-03-25 RX ADMIN — ROPIVACAINE HYDROCHLORIDE 30 ML: 5 INJECTION, SOLUTION EPIDURAL; INFILTRATION; PERINEURAL at 15:09

## 2021-03-25 RX ADMIN — ROCURONIUM BROMIDE 40 MG: 10 INJECTION, SOLUTION INTRAVENOUS at 17:03

## 2021-03-25 RX ADMIN — SODIUM CHLORIDE, SODIUM LACTATE, POTASSIUM CHLORIDE, AND CALCIUM CHLORIDE 100 ML/HR: 600; 310; 30; 20 INJECTION, SOLUTION INTRAVENOUS at 10:56

## 2021-03-25 RX ADMIN — HYDROMORPHONE HYDROCHLORIDE 2 MG: 2 TABLET ORAL at 21:38

## 2021-03-25 RX ADMIN — ONDANSETRON 4 MG: 2 INJECTION INTRAMUSCULAR; INTRAVENOUS at 17:13

## 2021-03-25 RX ADMIN — MIDAZOLAM HYDROCHLORIDE 2 MG: 1 INJECTION, SOLUTION INTRAMUSCULAR; INTRAVENOUS at 15:05

## 2021-03-25 RX ADMIN — SODIUM CHLORIDE, SODIUM LACTATE, POTASSIUM CHLORIDE, AND CALCIUM CHLORIDE: 600; 310; 30; 20 INJECTION, SOLUTION INTRAVENOUS at 18:34

## 2021-03-25 RX ADMIN — CEFAZOLIN SODIUM 2 G: 100 INJECTION, POWDER, LYOPHILIZED, FOR SOLUTION INTRAVENOUS at 10:51

## 2021-03-25 RX ADMIN — LIDOCAINE HYDROCHLORIDE 60 MG: 20 INJECTION, SOLUTION EPIDURAL; INFILTRATION; INTRACAUDAL; PERINEURAL at 17:03

## 2021-03-25 RX ADMIN — GLYCOPYRROLATE 0.4 MG: 0.2 INJECTION, SOLUTION INTRAMUSCULAR; INTRAVENOUS at 18:40

## 2021-03-25 RX ADMIN — Medication 3 MG: at 18:40

## 2021-03-25 RX ADMIN — PROPOFOL 150 MG: 10 INJECTION, EMULSION INTRAVENOUS at 17:03

## 2021-03-25 NOTE — ANESTHESIA POSTPROCEDURE EVALUATION
Procedure(s):   Procedure(s): REVERSE RIGHT TOTAL SHOULDER ARTHROPLASTY WITH DELTA EXTEND PROSTHESIS, LATISSIMUS DORSI AND TERES MAJOR TENDON TRANSFERS   Surgeon(s) and Role:.    general    Anesthesia Post Evaluation      Multimodal analgesia: multimodal analgesia used between 6 hours prior to anesthesia start to PACU discharge  Patient location during evaluation: PACU  Patient participation: complete - patient participated  Level of consciousness: awake and alert  Pain management: adequate  Airway patency: patent  Anesthetic complications: no  Cardiovascular status: acceptable and hemodynamically stable  Respiratory status: acceptable  Hydration status: acceptable  Post anesthesia nausea and vomiting:  none  Final Post Anesthesia Temperature Assessment:  Normothermia (36.0-37.5 degrees C)      INITIAL Post-op Vital signs:   Vitals Value Taken Time   /81 03/25/21 1923   Temp 36.3 °C (97.3 °F) 03/25/21 1853   Pulse 71 03/25/21 1923   Resp 16 03/25/21 1923   SpO2 93 % 03/25/21 1923

## 2021-03-25 NOTE — ANESTHESIA PREPROCEDURE EVALUATION
Relevant Problems   No relevant active problems       Anesthetic History   No history of anesthetic complications            Review of Systems / Medical History  Patient summary reviewed, nursing notes reviewed and pertinent labs reviewed    Pulmonary    COPD: moderate               Neuro/Psych   Within defined limits           Cardiovascular                Pertinent negatives: No hypertension  Exercise tolerance: >4 METS     GI/Hepatic/Renal     GERD: well controlled           Endo/Other          Pertinent negatives: No hypothyroidism   Other Findings            Physical Exam    Airway  Mallampati: II           Cardiovascular               Dental    Dentition: Poor dentition     Pulmonary                 Abdominal         Other Findings            Anesthetic Plan    ASA: 2  Anesthesia type: general      Post-op pain plan if not by surgeon: peripheral nerve block single    Induction: Intravenous  Anesthetic plan and risks discussed with: Patient

## 2021-03-25 NOTE — PERIOP NOTES
TRANSFER - OUT REPORT:    Verbal report given to Zo(name) on Bernardo Kong  being transferred to [facility](unit) for routine post - op       Report consisted of patients Situation, Background, Assessment and   Recommendations(SBAR). Information from the following report(s) SBAR, OR Summary and MAR was reviewed with the receiving nurse. Lines:   Peripheral IV 03/25/21 Left Hand (Active)        Opportunity for questions and clarification was provided.       Patient transported with:

## 2021-03-25 NOTE — H&P
Date of Surgery Update:  Tamar Barnett was seen and examined. History and physical has been reviewed. The patient has been examined.  There have been no significant clinical changes since the completion of the originally dated History and Physical.    Signed By: Celeste Andersen MD     March 25, 2021 10:39 AM

## 2021-03-25 NOTE — H&P
[address] [place] [address]    Name: Ruslan Fajardo  YOB: 1950  Gender: male  MRN: [id number]  Handedness: Right    What: Bilateral shoulder pain  How: Insidious onset      HPI: Ruslan Fajardo is a 69 y.o. male right hand dominant gentleman who is established with the practice seen for problems due to both shoulders. He has known rotator cuff tear arthropathy in the right shoulder. .  He has been injected in the past.  He complains of severe pain. He complains of that his right shoulder is affecting his quality of life. Now his left shoulder is giving him trouble. He has been recently diagnosed with bladder cancer. He is taking BCG treatments. He is here today for follow-up exam.      ROS/Meds/PSH/PMH/FH/SH: A ten system review of systems was performed and is negative other than what is in the HPI. Tobacco:  reports that he has been smoking cigarettes. He started smoking about 58 years ago. He has been smoking about 0.50 packs per day. He has never used smokeless tobacco.  There were no vitals taken for this visit. Physical Examination:  On exam his is a well-developed well-nourished gentleman. He is ambulating without difficulty    He has a slight restriction in cervical spine range of motion without radicular findings. Left shoulder has 0 to 160 degrees of active and passive forward elevation. Internal rotation to  T10. External rotation is 30 degrees at the side. Global left shoulder pain with overhead pain. .  Deltoid does fire. He is neurovascularly intact. Right shoulder has 0 to 90 degrees active 0 to 140 degrees of passive forward elevation with pain. Internal rotation to T12 external rotation is 0 degrees at the side. Global right shoulder pain and weakness. Positive drop and Hornblower sign. The deltoid does fire. He is neuro vastly intact.         Data Reviewed:    Radiographic Interpretation:   AP, Y, axillary views right shoulder demonstrate a high riding humeral head loss of the acromiohumeral interval degenerative changes consistent with rotator cuff tear arthropathy    AP, Y, axillary views left shoulder demonstrate a high riding humeral head loss of acromiohumeral interval secondary degenerative changes consistent with rotator cuff tear arthropathy      Minor procedure:  PROCEDURE: After sterile prep and drape of the left shoulder after a timeout, the patient received a 9:1 injection to the subacromial space and tolerated that well. It consisted of 4.5 mL of 2% Xylocaine, 4.5 mL of 0.25% bupivacaine and 80 mg of Depo-Medrol. A sterile dressing was applied. The patient tolerated the procedure well. Impression:     1. ROTATOR CUFF TEAR ARTHROPATHY RIGHT SHOULDER   AC arthritis right shoulder   Cervical spondylosis   Hypertension   History of prostate cancer stage undetermined   COPD with nicotine addiction   GERD   Hyperglycemia   Osteopenia   History of bladder cancer on BCG    Plan:   Following his subacromial junction he had improvement in left shoulder pain. His right shoulder is his biggest issue. We discussed nonoperative versus operative treatment. He is ready to proceed with operative and operative intervention. So at this point my opinion he has exhausted nonoperative route as he would be a candidate for a reverse right total shoulder arthroplasty with a delta extended prosthesis biceps tenodesis latissimus dorsi and teres major tendon transfer. Would be performed utilizing general anesthesia with interscalene block on an inpatient basis I would measure fasting blood glucose and a hemoglobin A1c I discussed risk and benefits of the procedure with him and expected outcomes particularly related to his nicotine addiction his prostate issues his bladder issues. Given his significant smoking he is at risk for postoperative complications including infection. We would use antibiotics in the cement if necessary.   We discussed risks and benefits and will proceed at his convenience.   5 This is a chronic illness with severe exacerbation and progression    Follow up:         Anabel Stearns., MD

## 2021-03-25 NOTE — BRIEF OP NOTE
BRIEF OPERATIVE NOTE    Date of Procedure: 3/25/2021     Preoperative Diagnosis:  ROTATOR CUFF TEAR ARTHROPATHY RIGHT SHOULDER    Postoperative Diagnosis:  SAME    Procedure(s): REVERSE RIGHT TOTAL SHOULDER ARTHROPLASTY WITH DELTA EXTEND PROSTHESIS, LATISSIMUS DORSI AND TERES MAJOR TENDON TRANSFERS    Surgeon(s) and Role:     * Abdirahman Mulligan MD - Primary         Assistant Staff:  NONE      Surgical Staff:  Circ-1: Abigail Ferguson RN  Scrub Tech-2: Sheree Bond Tech-3: Carlos Alejandra  Event Time In   Incision Start 1729   Incision Close        Anesthesia:  GENERAL WITH INTERSCALENE BLOCK    Estimated Blood Loss: 150 cc. Complications: NONE    Implants:   Implant Name Type Inv.  Item Serial No.  Lot No. LRB No. Used Action   CEMENT BNE 20ML 41GM FULL DOSE PMMA W/ Silva Blue VISC RADPQ - [id number]  CEMENT BNE 20ML 41GM FULL DOSE PMMA W/ TOBRA M VISC RADPQ  [employer] [facility]_ [id number] Right 1 Implanted   COMPONENT GLENOSPHERE ECC XTEND 38MM + 4MM - [id number]  COMPONENT GLENOSPHERE ECC XTEND 38MM + 4MM  [facility]_ [id number] Right 1 Implanted   COMPONENT YUE FIX [id number] SHLDR METAGLENE LNG PEG GLOB - [id number]  COMPONENT YUE FIX [id number] SHLDR METAGLENE LNG PEG GLOB  [facility] [employer]_ [id number] Right 1 Implanted   SCR BNE CRTX ST 4.5X36MM SS --  - [id number]  SCR BNE CRTX ST 4.5X36MM SS --   [employer] [place] [id number] Right 1 Implanted   SCR BNE CRTX ST 4.5X42MM SS --  - [id number]  SCR BNE CRTX ST 4.5X42MM SS --   [employer] [place] [id number] Right 1 Implanted   SCR BNE CRTX ST 4.5X26MM SS --  - [id number]  SCR BNE CRTX ST 4.5X26MM SS --   [employer] [place] [id number] Right 1 Implanted   STEM HUM SZ 2 [id number] 155DEG SHLDR CO CHROM ALLY STD ISAEL - [id number]  STEM HUM SZ 2 [id number] 155DEG SHLDR CO CHROM ALLY STD ISAEL  [facility] [employer]_WD [id number] Right 1 Implanted   RESTRICTOR [place] [id number] UNIV FEM CNL UHMWPE BIOSTP G - [id number]  RESTRICTOR [place] [id number] UNIV FEM CNL UHMWPE BIOSTP G  [facility] [employer]_ [id number] Right 1 Implanted   CUP HUM [id number] +3MM OFFSET STD SHLDR Jasielhuber Vaughn [id number]  CUP HUM [id number] +3MM OFFSET STD SHLDR POLYETH DELT XTEND  [facility] [facility] [id number] Right 1 Implanted   SPACER HUM +9MM OFFSET SHLDR POLYETH FOR DELT XTEND REV SYS - [id number]  SPACER HUM +9MM OFFSET SHLDR POLYETH FOR DELT XTEND REV SYS  [facility] [employer]_ [id number] Right 1 Implanted       Espiridion MD Mandie

## 2021-03-25 NOTE — DISCHARGE SUMMARY
[facility] [facility] Discharge Summary      Patient ID:  Braydon Barnett  [id number]  17 y.o.  1950    Allergies: Patient has no known allergies. Admit date: 3/25/2021    Discharge date and time: 3/26/2021     Admitting Physician: Bandar Cunningham MD     Discharge Physician: Bandar Cunningham MD      * Admission Diagnoses: Rotator cuff tear arthropathy of right shoulder [M75.101, M12.811]    * Discharge Diagnoses:   Hospital Problems as of 3/25/2021 Date Reviewed: 3/12/2021          Codes Class Noted - Resolved POA    * (Principal) Rotator cuff tear arthropathy of right shoulder ICD-10-CM: M75.101, M12.811  ICD-9-CM: 716.81  3/20/2021 - Present Yes              Surgeon: Bandar Cunningham MD      Preoperative Medical Clearance: yes    * Procedure: Procedure(s):  RIGHT SHOULDER ARTHROPLASTY TOTAL REVERSE WITH BICEPS TENODESIS IN COMBINATION WITH LATTISIMUS DORSI AND TERES MAJOR TENDON TRANSFER DELTA EXTEND  /GEN/SCAL           Perioperative Antibiotics: Ancef  _x__                                                Vancomycin  ___          Post Op complications: none        * Discharge Condition: good  Wound appears to be healing without any evidence of infection.          * Discharged to: Home    * Follow-up Care/Discharge instructions:  - Resume pre hospital diet            - Resume home medications per medical continuation form     CONTINUE PHYSICAL THERAPY  Sling right shoulder  - Follow up in office as scheduled       Signed:  Bandar Cunningham MD  3/25/2021  3:20 PM   .

## 2021-03-26 ENCOUNTER — HOME HEALTH ADMISSION (OUTPATIENT)
Dept: HOME HEALTH SERVICES | Facility: HOME HEALTH | Age: 71
End: 2021-03-26
Payer: MEDICARE

## 2021-03-26 VITALS
SYSTOLIC BLOOD PRESSURE: 147 MMHG | BODY MASS INDEX: 21.57 KG/M2 | TEMPERATURE: 97.9 F | OXYGEN SATURATION: 93 % | DIASTOLIC BLOOD PRESSURE: 80 MMHG | WEIGHT: 137.7 LBS | HEART RATE: 68 BPM | RESPIRATION RATE: 18 BRPM

## 2021-03-26 LAB
ANION GAP SERPL CALC-SCNC: 2 MMOL/L (ref 7–16)
BUN SERPL-MCNC: 13 MG/DL (ref 8–23)
CALCIUM SERPL-MCNC: 8.6 MG/DL (ref 8.3–10.4)
CHLORIDE SERPL-SCNC: 104 MMOL/L (ref 98–107)
CO2 SERPL-SCNC: 29 MMOL/L (ref 21–32)
CREAT SERPL-MCNC: 0.87 MG/DL (ref 0.8–1.5)
ERYTHROCYTE [DISTWIDTH] IN BLOOD BY AUTOMATED COUNT: 13.2 % (ref 11.9–14.6)
GLUCOSE SERPL-MCNC: 116 MG/DL (ref 65–100)
HCT VFR BLD AUTO: 34.2 % (ref 41.1–50.3)
HGB BLD-MCNC: 11.3 G/DL (ref 13.6–17.2)
MAGNESIUM SERPL-MCNC: 2.1 MG/DL (ref 1.8–2.4)
MCH RBC QN AUTO: 30.6 PG (ref 26.1–32.9)
MCHC RBC AUTO-ENTMCNC: 33 G/DL (ref 31.4–35)
MCV RBC AUTO: 92.7 FL (ref 79.6–97.8)
NRBC # BLD: 0 K/UL (ref 0–0.2)
PLATELET # BLD AUTO: 277 K/UL (ref 150–450)
PMV BLD AUTO: 8.8 FL (ref 9.4–12.3)
POTASSIUM SERPL-SCNC: 4.4 MMOL/L (ref 3.5–5.1)
RBC # BLD AUTO: 3.69 M/UL (ref 4.23–5.6)
SODIUM SERPL-SCNC: 135 MMOL/L (ref 136–145)
WBC # BLD AUTO: 14.1 K/UL (ref 4.3–11.1)

## 2021-03-26 PROCEDURE — 85027 COMPLETE CBC AUTOMATED: CPT

## 2021-03-26 PROCEDURE — 83735 ASSAY OF MAGNESIUM: CPT

## 2021-03-26 PROCEDURE — 36415 COLL VENOUS BLD VENIPUNCTURE: CPT

## 2021-03-26 PROCEDURE — 97110 THERAPEUTIC EXERCISES: CPT

## 2021-03-26 PROCEDURE — 74011000258 HC RX REV CODE- 258: Performed by: ORTHOPAEDIC SURGERY

## 2021-03-26 PROCEDURE — 97530 THERAPEUTIC ACTIVITIES: CPT

## 2021-03-26 PROCEDURE — 80048 BASIC METABOLIC PNL TOTAL CA: CPT

## 2021-03-26 PROCEDURE — 97161 PT EVAL LOW COMPLEX 20 MIN: CPT

## 2021-03-26 PROCEDURE — 74011250636 HC RX REV CODE- 250/636: Performed by: ORTHOPAEDIC SURGERY

## 2021-03-26 PROCEDURE — 74011250637 HC RX REV CODE- 250/637: Performed by: ORTHOPAEDIC SURGERY

## 2021-03-26 PROCEDURE — 99218 HC RM OBSERVATION: CPT

## 2021-03-26 PROCEDURE — 2709999900 HC NON-CHARGEABLE SUPPLY

## 2021-03-26 RX ORDER — PROMETHAZINE HYDROCHLORIDE 25 MG/1
25 TABLET ORAL
Qty: 20 TAB | Refills: 0 | Status: SHIPPED | OUTPATIENT
Start: 2021-03-26 | End: 2021-03-31

## 2021-03-26 RX ORDER — HYDROMORPHONE HYDROCHLORIDE 2 MG/1
2 TABLET ORAL
Qty: 40 TAB | Refills: 0 | Status: SHIPPED | OUTPATIENT
Start: 2021-03-26 | End: 2021-04-02

## 2021-03-26 RX ADMIN — Medication 10 ML: at 05:36

## 2021-03-26 RX ADMIN — CEFAZOLIN 1 G: 1 INJECTION, POWDER, FOR SOLUTION INTRAMUSCULAR; INTRAVENOUS at 08:58

## 2021-03-26 RX ADMIN — ATORVASTATIN CALCIUM 10 MG: 10 TABLET, FILM COATED ORAL at 08:58

## 2021-03-26 RX ADMIN — DOCUSATE SODIUM 100 MG: 100 CAPSULE, LIQUID FILLED ORAL at 08:58

## 2021-03-26 RX ADMIN — FERROUS SULFATE TAB 325 MG (65 MG ELEMENTAL FE) 325 MG: 325 (65 FE) TAB at 08:58

## 2021-03-26 RX ADMIN — PANTOPRAZOLE SODIUM 40 MG: 40 TABLET, DELAYED RELEASE ORAL at 08:58

## 2021-03-26 RX ADMIN — HYDROMORPHONE HYDROCHLORIDE 4 MG: 2 TABLET ORAL at 05:38

## 2021-03-26 RX ADMIN — HYDROMORPHONE HYDROCHLORIDE 1 MG: 1 INJECTION, SOLUTION INTRAMUSCULAR; INTRAVENOUS; SUBCUTANEOUS at 00:45

## 2021-03-26 RX ADMIN — Medication 10 ML: at 00:01

## 2021-03-26 RX ADMIN — CEFAZOLIN 1 G: 1 INJECTION, POWDER, FOR SOLUTION INTRAMUSCULAR; INTRAVENOUS at 00:00

## 2021-03-26 RX ADMIN — HYDROMORPHONE HYDROCHLORIDE 1 MG: 1 INJECTION, SOLUTION INTRAMUSCULAR; INTRAVENOUS; SUBCUTANEOUS at 08:58

## 2021-03-26 NOTE — PROGRESS NOTES
Problem: Mobility Impaired (Adult and Pediatric)  Goal: *Acute Goals and Plan of Care (Insert Text)  Outcome: Progressing Towards Goal  Note: GOALS (1-4 days):  (1.)  Patient will move from supine to sit and sit to supine  in bed with INDEPENDENT. (2.)  Patient will transfer from bed to chair and chair to bed with INDEPENDENT using the least restrictive device. (3.)  Patient will ambulate with INDEPENDENT for 500 feet with the least restrictive device. (4.)  Patient will be independent with shoulder HEP to increase range of motion per MD orders. ________________________________________________________________________________________________       PHYSICAL THERAPY: Initial Assessment and AM 3/26/2021  OBSERVATION: Hospital Day: 2  Payor: [facility] [place] MEDICARE / Plan: [facility] [place] MED[place]/PPO / Product Type: Managed Care Medicare /      NAME/AGE/GENDER: Dayday Felix is a 69 y.o. male   PRIMARY DIAGNOSIS: Rotator cuff tear arthropathy of right shoulder [M75.101, M12.811]  Right rotator cuff tear arthropathy [M75.101, M12.811] Rotator cuff tear arthropathy of right shoulder Rotator cuff tear arthropathy of right shoulder  Procedure(s) (LRB):   Procedure(s): REVERSE RIGHT TOTAL SHOULDER ARTHROPLASTY WITH DELTA EXTEND PROSTHESIS, LATISSIMUS DORSI AND TERES MAJOR TENDON TRANSFERS   Surgeon(s) and Role: (Right)  1 Day Post-Op  ICD-10: Treatment Diagnosis:   · Pain in Right Shoulder (M25.511)  · Stiffness of Right Shoulder, Not elsewhere classified (M25.611)     Precaution/Allergies:  Patient has no known allergies. ASSESSMENT:     Mr. Len Juan presents with decreased rom and strength of right UE s/p right reverse tsa. He plans to go home today with wife and HHPT. We discussed safety and precautions and protocol in detail including not pushing motion, no behind back, no weight, and use of sling. He and wife had no questions or concerns about going home.  He ambulated the huston and did steps without a problem. He did tsa exercises listed below with cues. Issued HEP sheet and pulleys(to wait until HHPT). Active and passive range of motion  right Elbow hand   Active assisted and passive range of motion  Right Shoulder to tolerance   Pulleys and pendulums   Do not push motion   nwb  Shoulder right   wbat ble   Sling  shoulder   Question: Reason for PT? Answer: s/p reverse total shoulder right         This section established at most recent assessment   PROBLEM LIST (Impairments causing functional limitations):  1. Decreased [place] with Bed Mobility  2. Decreased [place] with Transfers  3. Decreased [place] with Ambulation   4. Decreased [place] with shoulder HEP   INTERVENTIONS PLANNED: (Benefits and precautions of physical therapy have been discussed with the patient.)  1. Bed Mobility Training  2. Transfer Training  3. Gait Training  4. Therapeutic Exercises per MD orders  5. Modalities for Pain     TREATMENT PLAN: Frequency/Duration: twice daily for duration of hospital stay  Rehabilitation Potential For Stated Goals: Good     RECOMMENDED REHABILITATION/EQUIPMENT: (at time of discharge pending progress): Continue Skilled Therapy and Home Health: Physical Therapy. HISTORY:   History of Present Injury/Illness (Reason for Referral):  S/p reverse tsa right  Past Medical History/Comorbidities:   Mr. Mary Kim  has a past medical history of Arthritis, Arthritis, degenerative (9/28/2016), Chronic gastritis (9/28/2016), COPD (chronic obstructive pulmonary disease) ([facility] [address] (9/28/2016), HTN (hypertension) (9/28/2016), Hypercholesterolemia, Hypothyroidism (9/28/2016), Long-term use of high-risk medication (8/26/2019), Osteopenia (9/28/2016), Other ill-defined conditions(799.89), and Prostate cancer ([facility] [address] (9/28/2016). Mr. Mary Kim  has a past surgical history that includes hx partial thyroidectomy (Left); hx hernia repair; and hx prostatectomy.   Social History/Living Environment:   Home Environment: Private residence  One/Two Story Residence: One story  Living Alone: No  Support Systems: Family member(s), Friends \ neighbors  Patient Expects to be Discharged to[de-identified] Unknown  Current DME Used/Available at Home: None  Prior Level of Function/Work/Activity:  independent   Number of Personal Factors/Comorbidities that affect the Plan of Care: 1-2: MODERATE COMPLEXITY   EXAMINATION:   Most Recent Physical Functioning:   Gross Assessment:  AROM: Within functional limits(except right UE, left shoulder limited)  Strength: Generally decreased, functional(except right UE, left shoulder limited)  RUE AROM  R Shoulder Flexion: 30(aarom)  R Elbow Flexion: 100  R Elbow Extension: 20  R Wrist Flexion: (wfl)  R Wrist Extension: (wfl)  Right Hand Grasp: Yes            Posture:     Balance:  Sitting: Intact  Standing: Intact Bed Mobility:  Supine to Sit: Supervision  Sit to Supine: Supervision  Wheelchair Mobility:     Transfers:  Sit to Stand: Supervision  Stand to Sit: Supervision  Duration: 10 Minutes  Gait:     Speed/Lilliana: Delayed  Distance (ft): 250 Feet (ft)  Assistive Device: Brace/Splint  Ambulation - Level of Assistance: Supervision  Number of Stairs Trained: 3  Stairs - Level of Assistance: Stand-by assistance  Rail Use: Left   Interventions: Safety awareness training;Verbal cues      Body Structures Involved:  1. Bones  2. Joints  3. Muscles  4. Ligaments Body Functions Affected:  1. Movement Related Activities and Participation Affected:  1. Mobility   Number of elements that affect the Plan of Care: 3: MODERATE COMPLEXITY   CLINICAL PRESENTATION:   Presentation: Stable and uncomplicated: LOW COMPLEXITY   CLINICAL DECISION MAKIN \A Chronology of [place] [facility]\"" [address] [address] AM-PAC 6 Clicks   Basic Mobility Inpatient Short Form  How much difficulty does the patient currently have. .. Unable A Lot A Little None   1. Turning over in bed (including adjusting bedclothes, sheets and blankets)? [] 1   [] 2   [x] 3   [] 4   2. Sitting down on and standing up from a chair with arms ( e.g., wheelchair, bedside commode, etc.)   [] 1   [] 2   [] 3   [x] 4   3. Moving from lying on back to sitting on the side of the bed? [] 1   [] 2   [x] 3   [] 4   How much help from another person does the patient currently need. .. Total A Lot A Little None   4. Moving to and from a bed to a chair (including a wheelchair)? [] 1   [] 2   [] 3   [x] 4   5. Need to walk in hospital room? [] 1   [] 2   [] 3   [x] 4   6. Climbing 3-5 steps with a railing? [] 1   [] 2   [] 3   [x] 4   © 2007, Trustees of [facility] [place] [place] [employer], under license to [employer]. All rights reserved      Score:  Initial: 22 Most Recent: X (Date: -- )    Interpretation of Tool:  Represents activities that are increasingly more difficult (i.e. Bed mobility, Transfers, Gait). Medical Necessity:     · Patient is expected to demonstrate progress in strength, range of motion and balance to decrease assistance required with exercises and functional mobility. Reason for Services/Other Comments:  · Patient continues to require present interventions due to patient's inability to perform exercises and functional mobility independently. Use of outcome tool(s) and clinical judgement create a POC that gives a: Clear prediction of patient's progress: LOW COMPLEXITY            TREATMENT:   (In addition to Assessment/Re-Assessment sessions the following treatments were rendered)   Pre-treatment Symptoms/Complaints:  Shoulder pain  Pain: Initial:      Post Session:  5   assessment  Therapeutic Activity: (  10 Minutes ):  Therapeutic activities including Bed transfers, Ambulation on level ground, Stairs and standing to improve mobility, strength and balance. Required minimal Safety awareness training;Verbal cues to promote static and dynamic balance in standing. Therapeutic Exercise: (10 Minutes):  Exercises per grid below to improve mobility and strength.   Required minimal visual, verbal and manual cues to promote proper body alignment, promote proper body posture and promote proper body mechanics. Progressed range and repetitions as indicated. Date:  3/26 Date:   Date:     ACTIVITY/EXERCISE AM PM AM PM AM PM   Gripping 10        Wrist Flexion/Extension 10        Wrist Ulnar/Radial Deviation         Pronation/Supination 10        Elbow Flexion/Extension 10aa        Shoulder Flexion/Extension 10aa flexion        Shoulder AB/ADduction         Shoulder IR/ER         Pulleys         Pendulums 10        Shrugs 10        Isometric:                 Flexion         Extension         ABduction         ADduction         Biceps/Triceps                  B = bilateral; AA = active assistive; A = active; P = passive  Education:  [x]  Home Exercises  [x]  Sling Application   [x]  Movement Precautions   []  Pulleys   [x]  Use of Ice   []  Other:   Treatment/Session Assessment:    · Response to Treatment:  Did well  · Interdisciplinary Collaboration:   o Registered Nurse  · After treatment position/precautions:   o Supine in bed  o Bed/Chair-wheels locked  o Bed in low position  o Caregiver at bedside  o Call light within reach  o Family at bedside  o Nurse at bedside   · Compliance with Program/Exercises: compliant most of the time. · Recommendations/Intent for next treatment session:  Treatment next visit will focus on increasing Mr. Maurisio Baumann independence with bed mobility, transfers, gait training, strength/ROM exercises, modalities for pain, and patient education.    Total Treatment Duration:  PT Patient Time In/Time Out  Time In: 0820  Time Out: [facility],

## 2021-03-26 NOTE — PROGRESS NOTES
Orthopedic Joint Progress Note    2021  Admit Date: 3/25/2021  Admit Diagnosis: Rotator cuff tear arthropathy of right shoulder [M75.101, M12.811]; Right rotator cuff tear arthropathy [M75.101, M12.811]    1 Day Post-Op    Subjective: doing well ready to go home     Boyle Gasmen     Review of Systems: Pertinent items are noted in HPI. Objective:     PT/OT:     PATIENT MOBILITY                           Vital Signs:    Blood pressure 122/69, pulse 60, temperature 98.2 °F (36.8 °C), resp. rate 16, weight 137 lb 11.2 oz (62.5 kg), SpO2 92 %.   Temp (24hrs), Av.8 °F (36.6 °C), Min:97.3 °F (36.3 °C), Max:98.5 °F (36.9 °C)      Pain Control:   Pain Assessment  Pain Scale 1: Numeric (0 - 10)  Pain Intensity 1: 0    Meds:  Current Facility-Administered Medications   Medication Dose Route Frequency    albuterol (PROVENTIL VENTOLIN) nebulizer solution 2.5 mg  2.5 mg Nebulization Q6H PRN    0.9% sodium chloride infusion  75 mL/hr IntraVENous CONTINUOUS    sodium chloride (NS) flush 5-40 mL  5-40 mL IntraVENous Q8H    sodium chloride (NS) flush 5-40 mL  5-40 mL IntraVENous PRN    bisacodyL (DULCOLAX) suppository 10 mg  10 mg Rectal DAILY PRN    sodium phosphate (FLEET'S) enema 1 Enema  1 Enema Rectal PRN    promethazine (PHENERGAN) tablet 25 mg  25 mg Oral Q4H PRN    docusate sodium (COLACE) capsule 100 mg  100 mg Oral BID    ferrous sulfate tablet 325 mg  1 Tab Oral BID WITH MEALS    HYDROmorphone (DILAUDID) tablet 4 mg  4 mg Oral Q3H PRN    HYDROmorphone (DILAUDID) tablet 2 mg  2 mg Oral Q3H PRN    tuberculin injection 5 Units  5 Units IntraDERMal ONCE    HYDROmorphone (DILAUDID) injection 1 mg  1 mg IntraVENous Q1H PRN    pantoprazole (PROTONIX) tablet 40 mg  40 mg Oral DAILY    atorvastatin (LIPITOR) tablet 10 mg  10 mg Oral DAILY    ceFAZolin (ANCEF) 1 g in 0.9% sodium chloride (MBP/ADV) 50 mL MBP  1 g IntraVENous Q8H        LAB:    Lab Results   Component Value Date/Time    INR 1.0 03/17/2021 03:23 PM     Lab Results   Component Value Date/Time    HGB 11.3 (L) 03/26/2021 04:40 AM    HGB 13.2 (L) 03/17/2021 03:23 PM    HGB 12.5 (L) 08/31/2020 10:55 AM       Incision 03/25/21 Shoulder Right (Active)   Dressing Status Clean;Dry; Intact 03/25/21 1841   Dressing/Treatment ABD pad;Gauze dressing/dressing sponge 03/25/21 1841   Number of days: 1         Physical Exam:  No significant changes    Assessment:      Principal Problem:    Rotator cuff tear arthropathy of right shoulder (3/20/2021)    Active Problems:    Right rotator cuff tear arthropathy (3/25/2021)         Plan:     Continue PT/OT/Rehab  Doing well  Discharge home  Prescriptions e prescribed    Patient Expects to be Discharged to[de-identified] Unknown

## 2021-03-26 NOTE — PROGRESS NOTES
03/25/21 2136   Oxygen Therapy   O2 Sat (%) 94 %   Pulse via Oximetry 74 beats per minute   O2 Device Room air   O2 Flow Rate (L/min) 0 l/min   Pt on continuous monitor for HS. Alarm limits set. Pt working on IS.

## 2021-03-26 NOTE — OP NOTES
[address] [address]  OPERATIVE REPORT    Name:  Homar Monreal  MR#:  [id number]  :  1950  ACCOUNT #:  [de-identified]  DATE OF SERVICE:  2021    PREOPERATIVE DIAGNOSIS:  Rotator cuff tear arthropathy, right shoulder. POSTOPERATIVE DIAGNOSIS:  Rotator cuff tear arthropathy, right shoulder. PROCEDURES PERFORMED:  Reverse right shoulder arthroplasty with a Delta Xtend prosthesis, latissimus dorsi and teres major tendon transfer. SURGEON:  Eugenio Serra. Erendira Peralta MD        ANESTHESIA:  General with an interscalene block. COMPLICATIONS:  None. SPECIMENS REMOVED:  Rotator cuff tear arthropathy. IMPLANTS:  Hardware utilized is  a DePuy +10 metaglene, 38 eccentric +4 mm lateralized glenosphere, 38+3 cup, +9 extender, 12.2 stem, 12 mm Biostop G, 42 mm superior, 36 mm inferior, and 26 anterior nonlocking cortical screw. ESTIMATED BLOOD LOSS:  150 mL. CPT CODES:  [id number] and [id number]. ICD-10 CODE:  M12.811. INDICATIONS:  The patient is a 18-year-old gentleman with recalcitrant right shoulder pain. Preoperative physical exam, radiographs, and an MR demonstrate rotator cuff tear arthropathy, right shoulder. The patient has exhausted nonoperative modalities and electively admitted for operative intervention. PROCEDURE:  Following identification of the patient, the patient was taken to the operative suite. Following administration of general anesthesia, interscalene block for postop pain control, 2 g of IV Ancef, the patient was very carefully positioned on the operative table in the beach-chair fashion. Right shoulder was then prepped and draped in the sterile fashion. A standard deltopectoral incision was identified and marked. InteguSeal was applied. Once the InteguSeal was allowed to cure, the skin was incised. Subcutaneous tissue was then dissected down to the cephalic vein. This was dissected proximally and distally, retracted laterally with deltoid.   Pec major and strap muscles were retracted medially. Biceps tendon was noted to be torn and absent. Supra and infraspinatus were torn. Subscap was elevated sharply off the lesser tuberosity and tagged. Subdeltoid bursa was released. Axillary nerve was protected. Humerus was carefully dislocated from the wound. There were marked degenerative changes with a massive rotator cuff tear involving all supraspinatus, infraspinatus, and the teres minor. Proximal cutting guide was assembled. Proximal cut was made. Circumferential osteophytes were then resected. Glenoid retractor was then inserted. Glenoid was then meticulously exposed. Starter wire was then drilled. Glenoid was then drilled and reamed to a depth of +10. The inferior tilt was reamed as well. A +10 metaglene was inserted and secured with a 42 mm superior, 36 mm inferior, and 26 mm anterior nonlocking cortical screw. Metaglene was stable. A 38 eccentric +4 mm lateralized glenosphere was inserted in the standard fashion. Metaglene and glenosphere were stable. Humerus was then dislocated back from the wound and reamed to accommodate a 12.2 stem. It was noted that a 12.2 stem with a +9 extender and a 38 +3 cup gave excellent stability and mobility. At this point, all trial components were then removed. The humeral shaft was identified. The biceps was torn and chronically retracted. Latissimus dorsi and teres major tendons were then released off the humeral shaft, tagged, and mobilized for posterior transfer. The radial nerve was protected. At this point, the humeral canal was irrigated and dried. A 12 mm Biostop G was then placed distally. Antibiotic-impregnated cement was mixed and inserted in the humeral canal and a 12.2 stem was cemented in the appropriate version. Excessive cement was removed with a curette. Once the cement was allowed to cure, a true +9 extender with a 38+3 cup was inserted. Shoulder was reduced.   There was excellent stability with excellent mobility. At this point, arm was put through range of motion and stable. Axillary and radial nerves were intact. Latissimus dorsi and teres major tendons were then transferred posteriorly and secured with #5 and #2 Mersilene sutures. The wound was irrigated. Deltopectoral interval was approximated with #2 Mersilene sutures. Skin was closed with 0 Vicryl figure-of-eight sutures and a 2-0 Prolene subcuticular stitch. A sterile dressing was applied. A sling and swathe was applied. The patient was then transferred to the recovery room in stable condition.       MD FLAKITO [id number]  D:  03/25/2021 18:58  T:  03/25/2021 22:34  JOB #:  [id number]  CC:  Kristina Paulino MD

## 2021-03-26 NOTE — DISCHARGE INSTRUCTIONS
DISCHARGE SUMMARY from Nurse    The following personal items collected during your admission are returned to you:   Dental Appliance: Dental Appliances: Lowers; Other (comment) (bridge )  Vision: Visual Aid: Glasses  Hearing Aid:   na  Jewelry: Jewelry: None  Clothing: Clothing: At bedside  Other Valuables: Other Valuables: None  Valuables sent to safe:   na          PATIENT INSTRUCTIONS:    New Medications:    Dilaudid 2 mg tabs Take 1-2 tabs every 4-6 hrs as needed for pain. Phenergan 25 mg tabs Take 1 tab every 8 hrs as needed for nausea. Ambien 10 mg tabs Take 1 tab at bedtime as needed for sleep. After general anesthesia or intravenous sedation, for 24 hours or while taking prescription Narcotics:  · Limit your activities  · Do not drive and operate hazardous machinery  · Do not make important personal or business decisions  · Do  not drink alcoholic beverages  · If you have not urinated within 8 hours after discharge, please contact your surgeon on call. Report the following to your surgeon:  · Excessive pain, swelling, redness or odor of or around the surgical area  · Temperature over 101  · Nausea and vomiting lasting longer than 4 hours or if unable to take medications  · Any signs of decreased circulation or nerve impairment to extremity: change in color, persistent  numbness, tingling, coldness or increase pain  · Any questions, call office @ [phone]. What to do at Home:  Recommended activity: activity as tolerated, as instructed per Dr. Alma Alvarez. Continue with exercises taught by Physical Therapy. Resume per hospital diet. Wear sling to right arm. Use ice and elevate arm to decrease pain and swelling. If you experience any of the following symptoms temp>101, pain unrelieved by meds, or persistent nausea or vomitting, please follow up with Dr. Alma Alvarez @ [phone]. *  Please give a list of your current medications to your Primary Care Provider.     *  Please update this list whenever your medications are discontinued, doses are      changed, or new medications (including over-the-counter products) are added. *  Please carry medication information at all times in case of emergency situations. Shoulder Replacement Surgery: What to Expect at Home  Your Recovery  Shoulder replacement surgery replaces the worn parts of your shoulder joint. When you leave the hospital, your arm will be in a sling. It will be helpful if there is someone to help you at home for the next few weeks or until you have more energy and can move around better. You will go home with a bandage and stitches or staples. You can remove the bandage when your doctor tells you to. If the stitches are not the type that dissolve, your doctor will remove them in 10 to 14 days. You may still have some mild pain, and the area may be swollen for several months after surgery. Your doctor will give you medicine for the pain. You will continue the rehabilitation program (rehab) you started in the hospital. The better you do with your rehab exercises, the sooner you will get your strength and movement back. Depending on your job, you may be able to return to work as early as 2 to 3 weeks after surgery, as long as you avoid certain arm movements, such as lifting. This care sheet gives you a general idea about how long it will take for you to recover. But each person recovers at a different pace. Follow the steps below to get better as quickly as possible. How can you care for yourself at home? Activity  · Rest when you feel tired. You may take a nap, but don't stay in bed all day. · Work with your physical therapist to learn the best way to exercise. · You will have a sling to wear at night. And it's a good idea to also put a small stack of folded sheets or towels under your upper arm while you are in bed to keep your arm from dropping too far back.   · Your arm should stay next to your body or in front of it for several weeks, both while you are up and during sleep. · Don't lift anything with the affected arm for 6 weeks. · You may need to take sponge baths until your stitches or staples have been removed. You will probably be able to shower 24 hours after they are removed. · Ask your doctor when you can drive again. · Ask your doctor when it is okay for you to have sex. · Your doctor may advise you to give up activities that put stress on that shoulder. This includes sports such as weight lifting or tennis, unless your tennis arm was not the one affected. Diet  · By the time you leave the hospital, you will probably be eating your normal diet. If your stomach is upset, try bland, low-fat foods like plain rice, broiled chicken, toast, and yogurt. Your doctor may recommend that you take iron and vitamin supplements. · Drink plenty of fluids (unless your doctor tells you not to). · You may notice that your bowel movements are not regular right after your surgery. This is common. Try to avoid constipation and straining with bowel movements. You may want to take a fiber supplement every day. If you have not had a bowel movement after a couple of days, ask your doctor about taking a mild laxative. Medicines  · Be safe with medicines. Take pain medicines exactly as directed. ¨ If the doctor gave you a prescription medicine for pain, take it as prescribed. ¨ If you are not taking a prescription pain medicine, ask your doctor if you can take an over-the-counter medicine. · If you think your pain medicine is making you sick to your stomach:  ¨ Take your medicine after meals (unless your doctor has told you not to). ¨ Ask your doctor for a different pain medicine. · If your doctor prescribed antibiotics, take them as directed. Don't stop taking them just because you feel better. You need to take the full course of antibiotics. · If you take a blood thinner, be sure you get instructions about how to take your medicine safely.  Blood thinners can cause serious bleeding problems. Incision care  · You will have a dressing over the cut (incision). A dressing helps the incision heal and protects it. Your doctor will tell you how to take care of this. · Keep the area clean and dry. Exercise  · Shoulder rehabilitation is a series of exercises you do after your surgery. This helps you get back your shoulder's range of motion and strength. You will work with your doctor and physical therapist to plan this exercise program. To get the best results, you need to do the exercises correctly and as often and as long as your doctor tells you. Ice  · For pain, put ice or a cold pack on the area for 10 to 20 minutes at a time. Put a thin cloth between the ice and your skin. Other instructions  · Wear medical alert jewelry that says you may need antibiotics before any procedure, including dental work. You can buy this at most drugstores. Follow-up care is a key part of your treatment and safety. Be sure to make and go to all appointments, and call your doctor if you are having problems. It's also a good idea to know your test results and keep a list of the medicines you take. When should you call for help? Call 911 anytime you think you may need emergency care. For example, call if:  · You have severe trouble breathing. · You have symptoms of a blood clot in your lung (called a pulmonary embolism). These may include:  ¨ Sudden chest pain. ¨ Trouble breathing. ¨ Coughing up blood. Call your doctor now or seek immediate medical care if:  · You have severe or increasing pain. · You have symptoms of infection, such as:  ¨ Increased pain, swelling, warmth, or redness. ¨ Red streaks or pus. ¨ A fever. · You have tingling, weakness, or numbness in your arm. · Your arm turns cold or changes color. · You have symptoms of a blood clot in your leg (called a deep vein thrombosis).  These may include:  ¨ Pain in the calf, back of the knee, thigh, or groin.  ¨ Redness and swelling in the leg or groin. Watch closely for changes in your health, and be sure to contact your doctor if:  · You do not get better as expected. Where can you learn more? Go to [employer]  Enter F382 in the search box to learn more about \"Shoulder Replacement Surgery: What to Expect at Home. \"   © 1068-9791 Healthwise, [employer]. Care instructions adapted under license by [employer] (which disclaims liability or warranty for this information). This care instruction is for use with your licensed healthcare professional. If you have questions about a medical condition or this instruction, always ask your healthcare professional. [place] [address] any warranty or liability for your use of this information. Content Version: 6.3.597918; Last Revised: August 6, 2013                These are general instructions for a healthy lifestyle:    No smoking/ No tobacco products/ Avoid exposure to second hand smoke    Surgeon General's Warning:  Quitting smoking now greatly reduces serious risk to your health. Obesity, smoking, and sedentary lifestyle greatly increases your risk for illness    A healthy diet, regular physical exercise & weight monitoring are important for maintaining a healthy lifestyle    You may be retaining fluid if you have a history of heart failure or if you experience any of the following symptoms:  Weight gain of 3 pounds or more overnight or 5 pounds in a week, increased swelling in our hands or feet or shortness of breath while lying flat in bed. Please call your doctor as soon as you notice any of these symptoms; do not wait until your next office visit.     Recognize signs and symptoms of STROKE:    F-face looks uneven    A-arms unable to move or move unevenly    S-speech slurred or non-existent    T-time-call 911 as soon as signs and symptoms begin-DO NOT go       Back to bed or wait to see if you get better-TIME IS BRAIN.        The discharge information has been reviewed with the patient. The patient verbalized understanding.

## 2021-03-26 NOTE — PROGRESS NOTES
Patient received via stretcher from PACU. Patient A&O x4. Orientation to room/unit given. Shift assessment completed. Patient offers no complaints. Family member at bedside, will monitor.

## 2021-03-26 NOTE — PROGRESS NOTES
Discharge instructions given and prescriptions reviewed and given to patient. Opportunity for questions and clarification provided. Patient verbalizes understanding. Patient discharged in stable condition to car via wheelchair.

## 2021-03-26 NOTE — PROGRESS NOTES
Care Management Interventions  PCP Verified by CM: Yes  Mode of Transport at Discharge: Other (see comment)(Family)  Transition of Care Consult (CM Consult): Home Health, Discharge Jarad Marr [phone] [address]: Yes  MyChart Signup: No  Discharge Durable Medical Equipment: No  Physical Therapy Consult: Yes  Occupational Therapy Consult: No  Speech Therapy Consult: No  Current Support Network: Own Home  Confirm Follow Up Transport: Family  The Plan for Transition of Care is Related to the Following Treatment Goals : Return to independent functioning  The Patient and/or Patient Representative was Provided with a Choice of Provider and Agrees with the Discharge Plan?: Yes  Freedom of Choice List was Provided with Basic Dialogue that Supports the Patient's Individualized Plan of Care/Goals, Treatment Preferences and Shares the Quality Data Associated with the Providers?: Yes  Discharge Location  Discharge Placement: Home with home health      Initial assessment completed by Alexey Ramos. Patient agreeable for [facility] with [address] - referral made to initiate services.       PROSPER Emerson  [place]   [phone]

## 2021-03-27 ENCOUNTER — HOME CARE VISIT (OUTPATIENT)
Dept: SCHEDULING | Facility: HOME HEALTH | Age: 71
End: 2021-03-27
Payer: MEDICARE

## 2021-03-27 PROCEDURE — 3331090002 HH PPS REVENUE DEBIT

## 2021-03-27 PROCEDURE — G0151 HHCP-SERV OF PT,EA 15 MIN: HCPCS

## 2021-03-27 PROCEDURE — 400018 HH-NO PAY CLAIM PROCEDURE

## 2021-03-27 PROCEDURE — 3331090001 HH PPS REVENUE CREDIT

## 2021-03-27 PROCEDURE — 400013 HH SOC

## 2021-03-28 VITALS
TEMPERATURE: 97.3 F | HEART RATE: 78 BPM | DIASTOLIC BLOOD PRESSURE: 74 MMHG | SYSTOLIC BLOOD PRESSURE: 126 MMHG | RESPIRATION RATE: 18 BRPM

## 2021-03-28 LAB
ABO + RH BLD: NORMAL
BLD PROD TYP BPU: NORMAL
BLD PROD TYP BPU: NORMAL
BLOOD GROUP ANTIBODIES SERPL: NORMAL
BPU ID: NORMAL
BPU ID: NORMAL
CROSSMATCH RESULT,%XM: NORMAL
CROSSMATCH RESULT,%XM: NORMAL
SPECIMEN EXP DATE BLD: NORMAL
STATUS OF UNIT,%ST: NORMAL
STATUS OF UNIT,%ST: NORMAL
UNIT DIVISION, %UDIV: 0
UNIT DIVISION, %UDIV: 0

## 2021-03-28 PROCEDURE — 3331090001 HH PPS REVENUE CREDIT

## 2021-03-28 PROCEDURE — 3331090002 HH PPS REVENUE DEBIT

## 2021-03-29 ENCOUNTER — HOME CARE VISIT (OUTPATIENT)
Dept: SCHEDULING | Facility: HOME HEALTH | Age: 71
End: 2021-03-29
Payer: MEDICARE

## 2021-03-29 PROCEDURE — 3331090002 HH PPS REVENUE DEBIT

## 2021-03-29 PROCEDURE — G0151 HHCP-SERV OF PT,EA 15 MIN: HCPCS

## 2021-03-29 PROCEDURE — 3331090001 HH PPS REVENUE CREDIT

## 2021-03-29 NOTE — DISCHARGE SUMMARY
[address]    Name:  Leticia Shah  MR#:  [id number]  :  1950  ACCOUNT #:  [de-identified]  ADMIT DATE:  2021  DISCHARGE DATE:  2021    ADMISSION DIAGNOSIS:  Rotator cuff tear arthropathy, right shoulder. DISCHARGE DIAGNOSIS:  Rotator cuff tear arthropathy, right shoulder. Please see H and P, operative summary, and consult for details. HISTORY AND HOSPITAL COURSE:  The patient is a 40-year-old gentleman who was admitted on 2021, underwent an uncomplicated reverse right total shoulder arthroplasty with a Delta Xtend prosthesis, biceps tenodesis, latissimus dorsi and teres major tendon transfer. On postoperative day #1, all labs were within normal limits. His pain was well controlled. He was discharged home on postoperative day #1. He will continue therapy on the outside and follow up in my office in 10 days. Brian Bahena MD      [id number]  D:  2021 6:09  T:  2021 9:09  JOB #:  [id number]  CC:   Ravin Keenan MD

## 2021-03-30 PROCEDURE — 3331090002 HH PPS REVENUE DEBIT

## 2021-03-30 PROCEDURE — 3331090001 HH PPS REVENUE CREDIT

## 2021-03-31 ENCOUNTER — HOME CARE VISIT (OUTPATIENT)
Dept: SCHEDULING | Facility: HOME HEALTH | Age: 71
End: 2021-03-31
Payer: MEDICARE

## 2021-03-31 VITALS
RESPIRATION RATE: 17 BRPM | DIASTOLIC BLOOD PRESSURE: 74 MMHG | TEMPERATURE: 97.1 F | SYSTOLIC BLOOD PRESSURE: 128 MMHG | HEART RATE: 77 BPM

## 2021-03-31 PROCEDURE — 3331090001 HH PPS REVENUE CREDIT

## 2021-03-31 PROCEDURE — G0157 HHC PT ASSISTANT EA 15: HCPCS

## 2021-03-31 PROCEDURE — 3331090002 HH PPS REVENUE DEBIT

## 2021-04-01 VITALS
DIASTOLIC BLOOD PRESSURE: 80 MMHG | SYSTOLIC BLOOD PRESSURE: 140 MMHG | TEMPERATURE: 97.1 F | HEART RATE: 78 BPM | RESPIRATION RATE: 18 BRPM

## 2021-04-01 PROCEDURE — 3331090001 HH PPS REVENUE CREDIT

## 2021-04-01 PROCEDURE — 3331090002 HH PPS REVENUE DEBIT

## 2021-04-02 PROCEDURE — 3331090001 HH PPS REVENUE CREDIT

## 2021-04-02 PROCEDURE — 3331090002 HH PPS REVENUE DEBIT

## 2021-04-03 PROCEDURE — 3331090001 HH PPS REVENUE CREDIT

## 2021-04-03 PROCEDURE — 3331090002 HH PPS REVENUE DEBIT

## 2021-04-04 PROCEDURE — 3331090002 HH PPS REVENUE DEBIT

## 2021-04-04 PROCEDURE — 3331090001 HH PPS REVENUE CREDIT

## 2021-04-05 ENCOUNTER — HOME CARE VISIT (OUTPATIENT)
Dept: HOME HEALTH SERVICES | Facility: HOME HEALTH | Age: 71
End: 2021-04-05
Payer: MEDICARE

## 2021-04-05 VITALS
RESPIRATION RATE: 18 BRPM | TEMPERATURE: 97.3 F | HEART RATE: 82 BPM | SYSTOLIC BLOOD PRESSURE: 120 MMHG | DIASTOLIC BLOOD PRESSURE: 72 MMHG

## 2021-04-05 PROCEDURE — 3331090001 HH PPS REVENUE CREDIT

## 2021-04-05 PROCEDURE — 3331090002 HH PPS REVENUE DEBIT

## 2021-04-05 PROCEDURE — G0157 HHC PT ASSISTANT EA 15: HCPCS

## 2021-04-06 PROBLEM — M67.449 MUCOUS CYST OF FINGER: Status: ACTIVE | Noted: 2019-03-20

## 2021-04-06 PROCEDURE — 3331090001 HH PPS REVENUE CREDIT

## 2021-04-06 PROCEDURE — 3331090002 HH PPS REVENUE DEBIT

## 2021-04-07 PROCEDURE — 3331090001 HH PPS REVENUE CREDIT

## 2021-04-07 PROCEDURE — 3331090002 HH PPS REVENUE DEBIT

## 2021-04-08 ENCOUNTER — HOME CARE VISIT (OUTPATIENT)
Dept: SCHEDULING | Facility: HOME HEALTH | Age: 71
End: 2021-04-08
Payer: MEDICARE

## 2021-04-08 VITALS
TEMPERATURE: 98.3 F | SYSTOLIC BLOOD PRESSURE: 134 MMHG | RESPIRATION RATE: 17 BRPM | HEART RATE: 77 BPM | DIASTOLIC BLOOD PRESSURE: 88 MMHG

## 2021-04-08 PROCEDURE — G0151 HHCP-SERV OF PT,EA 15 MIN: HCPCS

## 2021-04-08 PROCEDURE — 3331090002 HH PPS REVENUE DEBIT

## 2021-04-08 PROCEDURE — 3331090001 HH PPS REVENUE CREDIT

## 2021-04-08 NOTE — THERAPY EVALUATION
Mukesh Pendleton  : 1950  Primary: [place]  Secondary: [place] [facility] at [address], [address] [place] [address], [facility], [address] [place] [address] Street  [phone]   [phone]       OUTPATIENT PHYSICAL THERAPY:Initial Assessment 2021    ICD-10: Treatment Diagnosis: right shoulder joint replacement surgery (Z96.611)                Treatment Diagnosis 2: right shoulder pain (M25.511)                Treatment Diagnosis 3: edema (R60.9)  Precautions: osteopenia, history of active prostate cancer, remission for thyroid cancer, and bladder cancer, FALLS RISK  Allergies: Patient has no known allergies. TREATMENT PLAN:  Effective Dates: 2021 TO 2021  Frequency/Duration: 2 times a week for 12 weeks MEDICAL/REFERRING DIAGNOSIS:  Aftercare following right shoulder joint replacement surgery [Z47.1, Z96.611]   DATE OF ONSET: s/p reverse right shoulder arthroplasty with a Delta Xtend prosthesis, latissimus dorsi and teres major tendon transfer 3/25/2021  REFERRING PHYSICIAN: Ciara Ramos MD MD Orders: Evaluate and Treat   Return MD Appointment: unknown - might be first week in May 2021  Physician Guideline:  Week 0-6: Sling at all times except for therapy for 3 weeks, sling as needed for an additional 3 weeks, pendulums to warm up, PROM progressing to AAROM, No IR, no biceps contraction        Weeks 1-3:supine ER 0-30, elevation to 90 degrees, isometrics abd, ER, IR        Weeks 4-6: supine ER and elevation gradual increase to full, continue above        Week 7-9: continue ROM with addition of AROM, start IR, continue above        Week 10: start strengthening     INITIAL ASSESSMENT:  Mr. Iza Moffett is a 69 y.o. male presenting to physical therapy s/p reverse right shoulder arthroplasty with a Delta Xtend prosthesis, latissimus dorsi and teres major tendon transfer 3/25/2021.   Patient reported no complications with surgery and was inpatient for 1 day. He underwent home therapy for 6 sessions without complaints. Patient is eager to car racing, working on cars, remodeling his house, and yard work. Patient is a good candidate for skilled intervention with services to include manual therapy, modalities as needed, therapeutic exercises, postural re-education, and activity modification. PROBLEM LIST (Impacting functional limitations):  1. Decreased Strength  2. Decreased ADL/Functional Activities  3. Decreased Transfer Abilities  4. Increased Pain  5. Increased Fatigue  6. Increased Shortness of Breath  7. Decreased Flexibility/Joint Mobility  8. Edema/Girth INTERVENTIONS PLANNED: (Treatment may consist of any combination of the following)  1. Cold  2. Cryotherapy  3. Decongestion Therapy  4. Electrical Stimulation  5. Heat  6. Home Exercise Program (HEP)  7. Manual Therapy  8. Neuromuscular Re-education/Strengthening  9. Range of Motion (ROM)  10. Therapeutic Exercise/Strengthening  11. Transcutaneous Electrical Nerve Stimulation (TENS)  12. Transfer Training     GOALS: (Goals have been discussed and agreed upon with patient.)  Short-Term Functional Goals: Time Frame: 4/9/2021 to 5/20/2021  1. Patient demonstrates independence with home exercise program without verbal cueing provided by therapist.  2. Improve seated posture with decreased forward head, forward shoulders, and thoracic kyphosis with self cuing and use of lumbar roll. 3. Improve PROM ER (at 0 degrees abduction) to at least 50 degrees, elevation to at least 140. Discharge Goals: Time Frame: 4/9/2021 to 7/1/2021  1. Improve pain to 3/10 at the most with work building and racing race cars, household chores, ADLs, house remodel, and yard work. 2. Improve PROM ER (at 0 degrees abduction) to at least 60 degrees, elevation to at least 150, IR to 70.  3. Improve Apley's IR to belt line in order to don/doff belt and put wallet into back pocket.   4. Improve strength to at least 4/5 in order to improve tolerance to lifting/carrying, pushing/pulling, work with racing and building cars. 5. Improve tenderness to palpation, tightness, and swelling of incision and proximal arm in order to improve sensitivity and pain. 6. Improve AROM elevation to at least 90 degrees with minimal scapular hike. 7. Improve Disabilities of the Arm, Shoulder, and Hand Index score to 30/55. Outcome Measure: Tool Used: Disabilities of the Arm, Shoulder and Hand (DASH) Questionnaire - Quick Version  Score:  Initial: 43/55  Most Recent: X/55 (Date: -- )   Interpretation of Score: The DASH is designed to measure the activities of daily living in person's with upper extremity dysfunction or pain. Each section is scored on a 1-5 scale, 5 representing the greatest disability. The scores of each section are added together for a total score of 55. Ambulatory/Rehab Services H2 Model Falls Risk Assessment   Risk Factors:       (1)  Gender [Male]       (5)  History of Recent Falls [w/in 3 months] Ability to Rise from Chair:       (1)  Pushes up, successful in one attempt   Falls Prevention Plan:       Physical Limitations to Exercise (specify):  supervise patient throughout sessions   Total: (5 or greater = High Risk): 7   ©2010 [facility] of Louie Keene. [facility] States Patent #[id number]. Federal Law prohibits the replication, distribution or use without written permission from [facility] Eliseolauren Necessity:   · Patient is expected to demonstrate progress in strength, range of motion, balance and coordination to improve tolerance to lifting, carrying, pushing, pulling as necessary for yard work, working on and racing cars, and household chores/projects. · Skilled intervention continues to be required due to weakness, decreased ROM, pain, functional limitations, and inability to work.   Reason for Services/Other Comments:  · Patient continues to require skilled intervention due to increasing complexity of exercises. Total Evaluation Duration: 30 minutes    Rehabilitation Potential For Stated Goals: Good  Regarding Ni Best's therapy, I certify that the treatment plan above will be carried out by a therapist or under their direction. Thank you for this referral,  Chhaya Harrison, PT     Referring Physician Signature: Kayla Sanchez MD _______________________________ Date _____________             PAIN/SUBJECTIVE:    Initial: Pain Intensity 1: 7  Pain Location 1: Shoulder  Pain Orientation 1: Right  Post Session:  5/10    HISTORY:    History of Injury/Illness (Reason for Referral):  Mr. Radha Andres is a 69 y.o. male presenting to physical therapy s/p reverse right shoulder arthroplasty with a Delta Xtend prosthesis, latissimus dorsi and teres major tendon transfer 3/25/2021. Patient reported no complications with surgery and was inpatient for 1 day. He underwent home therapy for 6 sessions without complaints. Patient is eager to car racing, working on cars, remodeling his house, and yard work. Past Medical History/Comorbidities:   Mr. Radha Andres  has a past medical history of Arthritis, Arthritis, degenerative (9/28/2016), Chronic gastritis (9/28/2016), COPD (chronic obstructive pulmonary disease) ([facility] (9/28/2016), HTN (hypertension) (9/28/2016), Hypercholesterolemia, Hypothyroidism (9/28/2016), Long-term use of high-risk medication (8/26/2019), Osteopenia (9/28/2016), Other ill-defined conditions(799.89), and Prostate cancer ([facility] (9/28/2016). Mr. Radha Andres  has a past surgical history that includes hx partial thyroidectomy (Left); hx hernia repair; hx prostatectomy; and hx orthopaedic (Right, 03/25/2021). Social History/Living Environment:    Patient lives with his ex wife and she is available for assistance. Prior Level of Function/Work/Activity:  Independent without dysfunction. 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   Dominant Side:         RIGHT    Current Medications:        Current Outpatient Medications:     HYDROmorphone (DILAUDID) 2 mg tablet, Take 1 Tab by mouth every four (4) hours as needed for Pain for up to 7 days. Max Daily Amount: 12 mg., Disp: 40 Tab, Rfl: 0    oxybutynin (DITROPAN) 5 mg tablet, Take 5 mg by mouth as needed for Bladder Spasms. , Disp: , Rfl:     loratadine (CLARITIN) 10 mg tablet, Take 10 mg by mouth daily. , Disp: , Rfl:     celecoxib (CELEBREX) 200 mg capsule, Take 1 Cap by mouth daily. , Disp: 90 Cap, Rfl: 3    pantoprazole (Protonix) 40 mg tablet, Take 1 Tab by mouth daily. Indications: gastroesophageal reflux disease, Disp: 90 Tab, Rfl: 3    atorvastatin (LIPITOR) 10 mg tablet, Take 1 Tab by mouth daily. , Disp: 90 Tab, Rfl: 3    Date Last Reviewed:  4/9/2021    Number of Personal Factors/Comorbidities that affect the Plan of Care: 1-2: MODERATE COMPLEXITY    EXAMINATION:      Observation/Postural and Gait Assessment:  Patient sits with significant forward head, forward shoulders, and thoracic kyphosis that are reversible with cuing. Patient has a large anterior proximal humerus incision that is completely approximated with scar tissue, scabbing, and surgical glue. No signs or symptoms of infection or deep vein thrombosis are noted at this time. Palpation:  Gross tenderness to palpation of right shoulder with palpable swelling of the proximal humerus and anterior chest.  Flexibility moderately limited of pectoralis minor and major. ROM: NT: not tested  PROM in degrees Left Right   Shoulder flexion 170° 90°   Shoulder abduction  170° 90°   Shoulder internal rotation (IR)  (measured at 45° abduction) 80° 30°   Shoulder external rotation (ER)  (measured 0° abduction) 30° 30°   ER (measured at 90° degrees of abduction) NT° NT°   Apley's scratch test (functional IR AROM)       Passive Accessory Motion: Grossly hypomobile for the right shoulder joint.     Strength:   Manual Muscle Test (out of 5) Left Right   Shoulder scaption At least 3 NT   Shoulder ER At least 3 NT   Shoulder IR At least 3 NT   Teres Minor NT NT   Infraspinatus NT NT     Special Tests:  None due to acuity of shoulder surgery. No signs or symptoms of infection or deep vein thrombosis are noted at this time. Neurological Screen:  Myotomes: Key muscle strength testing for bilateral UE is WNL. Dermatomes: Sensation testing through bilateral UE for light touch is WNL. Functional Mobility:  Patient is safe and independent with left upper extremity tasks but requires assistance with bilateral upper extremity tasks. Body Structures Involved:  1. Joints  2. Muscles  3. Ligaments Body Functions Affected:  1. Sensory/Pain  2. Neuromusculoskeletal Activities and Participation Affected:  1. General Tasks and Demands  2. Domestic Life  3.  Community, Social and [place]    Number of elements (examined above) that affect the Plan of Care: 3: MODERATE COMPLEXITY    CLINICAL PRESENTATION:    Presentation: Evolving clinical presentation with changing clinical characteristics: MODERATE COMPLEXITY    CLINICAL DECISION MAKING:    Use of outcome tool(s) and clinical judgement create a POC that gives a: Questionable prediction of patient's progress: MODERATE COMPLEXITY

## 2021-04-08 NOTE — PROGRESS NOTES
Yara Host  : 1950  Primary: [place]  Secondary: [place] Of [facility] at [facility]  [address] [place], [address] [place] [address], Mahamed reed, [address] [place] [address] Street  [phone]   [phone]      OUTPATIENT PHYSICAL THERAPY: Daily Treatment Note 2021    ICD-10: Treatment Diagnosis: right shoulder joint replacement surgery (Z96.611)                Treatment Diagnosis 2: right shoulder pain (M25.511)                Treatment Diagnosis 3: edema (R60.9)  Precautions: osteopenia, history of active prostate cancer, remission for thyroid cancer, and bladder cancer, FALLS RISK  Allergies: Patient has no known allergies. TREATMENT PLAN:  Effective Dates: 2021 TO 2021  Frequency/Duration: 2 times a week for 12 weeks MEDICAL/REFERRING DIAGNOSIS:  Aftercare following right shoulder joint replacement surgery [Z47.1, Z96.611]   DATE OF ONSET: s/p reverse right shoulder arthroplasty with a Delta Xtend prosthesis, latissimus dorsi and teres major tendon transfer 3/25/2021  REFERRING PHYSICIAN: Pascale Kim MD MD Orders: Evaluate and Treat   Return MD Appointment: unknown - might be first week in May 2021  Physician Guideline:  Week 0-6: Sling at all times except for therapy for 3 weeks, sling as needed for an additional 3 weeks, pendulums to warm up, PROM progressing to AAROM, No IR, no biceps contraction        Weeks 1-3:supine ER 0-30, elevation to 90 degrees, isometrics abd, ER, IR        Weeks 4-6: supine ER and elevation gradual increase to full, continue above        Week 7-9: continue ROM with addition of AROM, start IR, continue above        Week 10: start strengthening     Pre-treatment Symptoms/Complaints:  Patient reported improvements overall and less pain in the shoulder since surgery.     Pain: Initial: Pain Intensity 1: 7  Pain Location 1: Shoulder  Pain Orientation 1: Right  Post Session:  5/10   Medications Last Reviewed: 4/9/2021  Updated Objective Findings:  See evaluation note from today   TREATMENT:   THERAPEUTIC EXERCISE: (15 minutes):  Exercises per grid below to improve mobility, strength, balance and coordination. Required minimal visual, verbal and manual cues to promote proper body alignment, promote proper body posture, promote proper body mechanics and promote proper body breathing techniques. Progressed resistance, range, repetitions and complexity of movement as indicated. Date:  4/9/2021 Date:   Date:     Activity/Exercise Parameters Parameters Parameters   Supine stick ER To 30 degrees only, pillow under arm, 10 sec x 10     Seated scapular retraction Good posture 2 x 10     Seated shrug Good posture, 2 x 10     pendulums Circles, front to back, side to side, x 20 each way     Isometric - abduction, IR, ER NEXT SESSION PLEASE     Supine stick flexion NEXT SESSION PLEASE     Seated ball roll NEXT SESSION PLEASE       Time spent with patient reviewing proper muscle recruitment and technique with exercises. MANUAL THERAPY: (0 minutes): Joint mobilization and Soft tissue mobilization was utilized and necessary because of the patient's restricted joint motion and restricted motion of soft tissue   Not today - next session supine PROM within ROM limitations, oscillations for pain control    MODALITIES: (15 minutes):      seated vasopneumatic with pillow under arm to right shoulder for swelling and pain     HEP: As above; handouts given to patient for all exercises. Treatment/Session Summary:    · Response to Treatment:  Patient tolerated session well and reported no complaints. Continue to progress as tolerated. Patient reported a good understanding of HEP. Antwan Saxena · Communication/Consultation:  None today  · Equipment provided today:  None today  · Recommendations/Intent for next treatment session: Next visit will focus on following guideline above, modalities, exercises, manual therapy as needed.     Total Treatment Billable Duration:  60 minutes: 30 evaluation, 15 exercises (not charged due to insurance), 15 vasopneumatic (not charged due to insurance)  PT Patient Time In/Time Out  Time In: 1100  Time Out: 1700 [facility]

## 2021-04-09 ENCOUNTER — HOSPITAL ENCOUNTER (OUTPATIENT)
Dept: PHYSICAL THERAPY | Age: 71
Discharge: HOME | End: 2021-04-09
Payer: MEDICARE

## 2021-04-09 DIAGNOSIS — Z96.611 AFTERCARE FOLLOWING RIGHT SHOULDER JOINT REPLACEMENT SURGERY: ICD-10-CM

## 2021-04-09 DIAGNOSIS — Z47.1 AFTERCARE FOLLOWING RIGHT SHOULDER JOINT REPLACEMENT SURGERY: ICD-10-CM

## 2021-04-09 PROCEDURE — 3331090001 HH PPS REVENUE CREDIT

## 2021-04-09 PROCEDURE — 3331090002 HH PPS REVENUE DEBIT

## 2021-04-09 PROCEDURE — 97162 PT EVAL MOD COMPLEX 30 MIN: CPT

## 2021-04-10 PROCEDURE — 3331090001 HH PPS REVENUE CREDIT

## 2021-04-10 PROCEDURE — 3331090002 HH PPS REVENUE DEBIT

## 2021-04-11 PROCEDURE — 3331090002 HH PPS REVENUE DEBIT

## 2021-04-11 PROCEDURE — 3331090001 HH PPS REVENUE CREDIT

## 2021-04-13 NOTE — PROGRESS NOTES
Patient cancelled his appointment on 4/13/2021 because he had an issue with a car breaking down and needing towed.   He was rescheduled for Thursday 4/15/21 at [address] DPT

## 2021-04-15 ENCOUNTER — HOSPITAL ENCOUNTER (OUTPATIENT)
Dept: PHYSICAL THERAPY | Age: 71
Discharge: HOME | End: 2021-04-15
Payer: MEDICARE

## 2021-04-15 PROCEDURE — 97110 THERAPEUTIC EXERCISES: CPT

## 2021-04-15 NOTE — PROGRESS NOTES
Lucy Doss  : 1950  Primary: [place]  Secondary: [place] Of [facility] at [facility]  [address], [address] [place] [address], Mahamed reed, [address] [place] [address] Street  [phone]   [phone]      OUTPATIENT PHYSICAL THERAPY: Daily Treatment Note 4/15/2021    ICD-10: Treatment Diagnosis: right shoulder joint replacement surgery (Z96.611)                Treatment Diagnosis 2: right shoulder pain (M25.511)                Treatment Diagnosis 3: edema (R60.9)  Precautions: osteopenia, history of active prostate cancer, remission for thyroid cancer, and bladder cancer, FALLS RISK  Allergies: Patient has no known allergies. TREATMENT PLAN:  Effective Dates: 2021 TO 2021  Frequency/Duration: 2 times a week for 12 weeks MEDICAL/REFERRING DIAGNOSIS:  Aftercare following right shoulder joint replacement surgery [Z47.1, Z96.611]   DATE OF ONSET: s/p reverse right shoulder arthroplasty with a Delta Xtend prosthesis, latissimus dorsi and teres major tendon transfer 3/25/2021  REFERRING PHYSICIAN: Irais Andres MD MD Orders: Evaluate and Treat   Return MD Appointment: unknown - might be first week in May 2021  Physician Guideline:  Week 0-6: Sling at all times except for therapy for 3 weeks, sling as needed for an additional 3 weeks, pendulums to warm up, PROM progressing to AAROM, No IR, no biceps contraction        Weeks 1-3:supine ER 0-30, elevation to 90 degrees, isometrics abd, ER, IR        Weeks 4-6: supine ER and elevation gradual increase to full, continue above        Week 7-9: continue ROM with addition of AROM, start IR, continue above        Week 10: start strengthening     Pre-treatment Symptoms/Complaints:  Patient reported making slow steady progress each day. Pain: Initial: Pain Intensity 1: 4  Pain Location 1: Shoulder  Pain Orientation 1: Right  Post Session:  0/10 Pt. Reported no pain and felt much better after session. Medications Last Reviewed:  4/15/2021  Updated Objective Findings:  Pt. compliant with all exercises    TREATMENT:   THERAPEUTIC EXERCISE: (30 minutes):  Exercises per grid below to improve mobility, strength, balance and coordination. Required minimal visual, verbal and manual cues to promote proper body alignment, promote proper body posture, promote proper body mechanics and promote proper body breathing techniques. Progressed resistance, range, repetitions and complexity of movement as indicated. Date:  4/9/2021 Date:  4/15/21 Date:     Activity/Exercise Parameters Parameters Parameters   Supine stick ER To 30 degrees only, pillow under arm, 10 sec x 10 To 30 degrees only, pillow under arm x 10 reps x 10 sec hold     Seated scapular retraction Good posture 2 x 10 2x10 reps good posture    Seated shrug Good posture, 2 x 10 2x10 reps with good posture    pendulums Circles, front to back, side to side, x 20 each way X 20 reps each in all 4 directions     Isometric - abduction, IR, ER NEXT SESSION PLEASE With yellow ball at wall 2x10 reps of each exercise 5 sec hold     Supine stick flexion NEXT SESSION PLEASE Wand shoulder flexion x 20 reps     Seated ball roll NEXT SESSION PLEASE Blue ball seated in chair x 20 reps BUE's       Time spent with patient reviewing proper muscle recruitment and technique with exercises. MANUAL THERAPY: (10  minutes): Joint mobilization and Soft tissue mobilization was utilized and necessary because of the patient's restricted joint motion and restricted motion of soft tissue  Pt. Received PROM to right shoulder in all planes as per MD guidelines. Gentle distraction and oscillation. (no charge)    MODALITIES: (15 minutes):      seated vasopneumatic with pillow under arm to right shoulder for swelling and pain (no charge)  HEP: As above; handouts given to patient for all exercises.     Treatment/Session Summary:    · Response to Treatment:  Pt. was compliant and appeared to have a grasp on all exercises. .  · Communication/Consultation:  None today  · Equipment provided today:  None today  · Recommendations/Intent for next treatment session: Next visit will focus on following guideline above, modalities, exercises, manual therapy as needed.     Total Treatment Billable Duration:  30 mins   PT Patient Time In/Time Out  Time In: 1100  Time Out: 3200 [facility]

## 2021-04-20 ENCOUNTER — HOSPITAL ENCOUNTER (OUTPATIENT)
Dept: PHYSICAL THERAPY | Age: 71
Discharge: HOME | End: 2021-04-20
Payer: MEDICARE

## 2021-04-20 PROCEDURE — 97110 THERAPEUTIC EXERCISES: CPT

## 2021-04-20 PROCEDURE — 97140 MANUAL THERAPY 1/> REGIONS: CPT

## 2021-04-20 NOTE — PROGRESS NOTES
Concepcion Cohn  : 1950  Primary: [place]  Secondary: [place] Of [facility] at [facility]  [address] [place], [address] [place] [address], Mahamed reed, [address] [place] [address] Street  [phone]   [phone]      OUTPATIENT PHYSICAL THERAPY: Daily Treatment Note 2021    ICD-10: Treatment Diagnosis: right shoulder joint replacement surgery (Z96.611)                Treatment Diagnosis 2: right shoulder pain (M25.511)                Treatment Diagnosis 3: edema (R60.9)  Precautions: osteopenia, history of active prostate cancer, remission for thyroid cancer, and bladder cancer, FALLS RISK  Allergies: Patient has no known allergies. TREATMENT PLAN:  Effective Dates: 2021 TO 2021  Frequency/Duration: 2 times a week for 12 weeks MEDICAL/REFERRING DIAGNOSIS:  Aftercare following right shoulder joint replacement surgery [Z47.1, Z96.611]   DATE OF ONSET: s/p reverse right shoulder arthroplasty with a Delta Xtend prosthesis, latissimus dorsi and teres major tendon transfer 3/25/2021  REFERRING PHYSICIAN: Anastasiia Evans MD MD Orders: Evaluate and Treat   Return MD Appointment: unknown - might be first week in May 2021  Physician Guideline:  Week 0-6: Sling at all times except for therapy for 3 weeks, sling as needed for an additional 3 weeks, pendulums to warm up, PROM progressing to AAROM, No IR, no biceps contraction        Weeks 1-3:supine ER 0-30, elevation to 90 degrees, isometrics abd, ER, IR        Weeks 4-6: supine ER and elevation gradual increase to full, continue above        Week 7-9: continue ROM with addition of AROM, start IR, continue above        Week 10: start strengthening     Pre-treatment Symptoms/Complaints:  Patient reports minimal to no pain. .     Pain: Initial: Pain Intensity 1: 0  Pain Location 1: Shoulder  Pain Orientation 1: Right  Post Session:  0/10 .     Medications Last Reviewed:  2021  Updated Objective Findings:  None Today   TREATMENT:   THERAPEUTIC EXERCISE: (53 minutes):  Exercises per grid below to improve mobility, strength, balance and coordination. Required minimal visual, verbal and manual cues to promote proper body alignment, promote proper body posture, promote proper body mechanics and promote proper body breathing techniques. Progressed resistance, range, repetitions and complexity of movement as indicated. Date:  4/9/2021 Date:  4/15/21 Date:  4-20-21   Activity/Exercise Parameters Parameters Parameters   Supine stick ER To 30 degrees only, pillow under arm, 10 sec x 10 To 30 degrees only, pillow under arm x 10 reps x 10 sec hold  To 30 degrees 10 x 10 sec pillow under arm   Seated scapular retraction Good posture 2 x 10 2x10 reps good posture 3 x 10   Seated shrug Good posture, 2 x 10 2x10 reps with good posture 4 x 10   pendulums Circles, front to back, side to side, x 20 each way X 20 reps each in all 4 directions  X 1 min each   Isometric - abduction, IR, ER NEXT SESSION PLEASE With yellow ball at wall 2x10 reps of each exercise 5 sec hold  2 x 10 5 sec hold   Supine stick flexion NEXT SESSION PLEASE Wand shoulder flexion x 20 reps  2 x 10   Seated ball roll NEXT SESSION PLEASE Blue ball seated in chair x 20 reps BUE's  X 20   PROM    Forward elevation and external with  Rotation and gentle oscillation x 20 minutes     Time spent with patient reviewing proper muscle recruitment and technique with exercises. MANUAL THERAPY:0  minutes): Joint mobilization and Soft tissue mobilization was utilized and necessary because of the patient's restricted joint motion and restricted motion of soft tissue  Pt. Received PROM to right shoulder in all planes as per MD guidelines. Gentle distraction and oscillation.  (no charge)    MODALITIES: (0 minutes):      seated vasopneumatic with pillow under arm to right shoulder for swelling and pain (no charge)  HEP: As above; handouts given to patient for all exercises. Treatment/Session Summary:    · Response to Treatment:  Tolerated session well. pt declined vaso he is to ice at home. · Communication/Consultation:  None today  · Equipment provided today:  None today  · Recommendations/Intent for next treatment session: Next visit will focus on following guideline above, modalities, exercises, manual therapy as needed.     Total Treatment Billable Duration:  53 mins   PT Patient Time In/Time Out  Time In: 1430  Time Out: 726 [facility]

## 2021-04-22 ENCOUNTER — HOSPITAL ENCOUNTER (OUTPATIENT)
Dept: PHYSICAL THERAPY | Age: 71
Discharge: HOME | End: 2021-04-22
Payer: MEDICARE

## 2021-04-22 PROCEDURE — 97110 THERAPEUTIC EXERCISES: CPT

## 2021-04-22 NOTE — PROGRESS NOTES
Marissa Brown  : 1950  [place]  Secondary: [place] Of [facility] at [facility]  [address] [place], [address] [place] [address], Mahamed reed, [address] [place] [address] Street  [phone]   [phone]      OUTPATIENT PHYSICAL THERAPY: Daily Treatment Note 2021    ICD-10: Treatment Diagnosis: right shoulder joint replacement surgery (Z96.611)                Treatment Diagnosis 2: right shoulder pain (M25.511)                Treatment Diagnosis 3: edema (R60.9)  Precautions: osteopenia, history of active prostate cancer, remission for thyroid cancer, and bladder cancer, FALLS RISK  Allergies: Patient has no known allergies. TREATMENT PLAN:  Effective Dates: 2021 TO 2021  Frequency/Duration: 2 times a week for 12 weeks MEDICAL/REFERRING DIAGNOSIS:  Aftercare following right shoulder joint replacement surgery [Z47.1, Z96.611]   DATE OF ONSET: s/p reverse right shoulder arthroplasty with a Delta Xtend prosthesis, latissimus dorsi and teres major tendon transfer 3/25/2021  REFERRING PHYSICIAN: Oscar Lemus MD MD Orders: Evaluate and Treat   Return MD Appointment: unknown - might be first week in May 2021  Physician Guideline:  Week 0-6: Sling at all times except for therapy for 3 weeks, sling as needed for an additional 3 weeks, pendulums to warm up, PROM progressing to AAROM, No IR, no biceps contraction        Weeks 1-3:supine ER 0-30, elevation to 90 degrees, isometrics abd, ER, IR        Weeks 4-6: supine ER and elevation gradual increase to full, continue above        Week 7-9: continue ROM with addition of AROM, start IR, continue above        Week 10: start strengthening     Pre-treatment Symptoms/Complaints:  Patient reported improvements overall and less pain. Today OK to progress ROM to full to tolerance.     Pain: Initial: Pain Intensity 1: 4  Pain Location 1: Shoulder  Pain Orientation 1: Right  Post Session:  0/10   Medications Last Reviewed:  4/22/2021  Updated Objective Findings:  see table below   PROM in degrees   Left Right   Shoulder flexion 170° 130 (from 90°)   Shoulder abduction  170° 130 (from 90°)   Shoulder internal rotation (IR)  (measured at 45° abduction) 80° 50 (From 30°)   Shoulder external rotation (ER)  (measured 0° abduction) 30° 50 (from 30°)   ER (measured at 90° degrees of abduction) NT° 80°   Apley's scratch test (functional IR AROM)  NT NT       TREATMENT:   THERAPEUTIC EXERCISE: (53 minutes):  Exercises per grid below to improve mobility, strength, balance and coordination. Required minimal visual, verbal and manual cues to promote proper body alignment, promote proper body posture, promote proper body mechanics and promote proper body breathing techniques. Progressed resistance, range, repetitions and complexity of movement as indicated. Date:  4/22/2021 Date:  4/15/21 Date:  4-20-21   Activity/Exercise Parameters Parameters Parameters   Supine stick ER 10 sec x 10 To 30 degrees only, pillow under arm x 10 reps x 10 sec hold  To 30 degrees 10 x 10 sec pillow under arm   Seated scapular retraction Good posture 3 x 10 2x10 reps good posture 3 x 10   Seated shrug Good posture, 3 x 10 2x10 reps with good posture 4 x 10   pendulums Circles, front to back, side to side, x 20 each way, 2lbs X 20 reps each in all 4 directions  X 1 min each   Isometric - abduction, IR, ER, adduction 10 sec x 20 With yellow ball at wall 2x10 reps of each exercise 5 sec hold  2 x 10 5 sec hold   Supine stick flexion 10 sec x 10 Wand shoulder flexion x 20 reps  2 x 10   Seated ball roll Blue ball seated chair 10 sec x 10 Blue ball seated in chair x 20 reps BUE's  X 20   Pulley  10 sec x 10     Finger ladder 10 sec x 10     PROM  All ranges - 10 minutes  Forward elevation and external with  Rotation and gentle oscillation x 20 minutes     Time spent with patient reviewing proper muscle recruitment and technique with exercises.      MANUAL THERAPY:(0  minutes): Joint mobilization and Soft tissue mobilization was utilized and necessary because of the patient's restricted joint motion and restricted motion of soft tissue  Pt. Received PROM to right shoulder in all planes as per MD guidelines. Gentle distraction and oscillation. (no charge)    MODALITIES: (0 minutes):      seated vasopneumatic with pillow under arm to right shoulder for swelling and pain (no charge)  HEP: As above; handouts given to patient for all exercises. Treatment/Session Summary:    · Response to Treatment:  Patient tolerated session well and ROM continues to progress. · Communication/Consultation:  None today  · Equipment provided today:  None today  · Recommendations/Intent for next treatment session: Next visit will focus on following guideline above, modalities, exercises, manual therapy as needed.     Total Treatment Billable Duration:  53 mins   PT Patient Time In/Time Out  Time In: 1325  Time Out: 1202 [address] [place] [address], PT

## 2021-04-27 ENCOUNTER — HOSPITAL ENCOUNTER (OUTPATIENT)
Dept: PHYSICAL THERAPY | Age: 71
Discharge: HOME | End: 2021-04-27
Payer: MEDICARE

## 2021-04-27 PROCEDURE — 97110 THERAPEUTIC EXERCISES: CPT

## 2021-04-29 ENCOUNTER — HOSPITAL ENCOUNTER (OUTPATIENT)
Dept: PHYSICAL THERAPY | Age: 71
Discharge: HOME | End: 2021-04-29
Payer: MEDICARE

## 2021-04-29 PROCEDURE — 97110 THERAPEUTIC EXERCISES: CPT

## 2021-04-29 NOTE — PROGRESS NOTES
Bibiana Mendozarochelles  : 1950  [place]  Secondary: [place] Of [facility] at [facility]  [address], [address] [place] [address], [facility], [address] [place] [address] Street  [phone]   [phone]      OUTPATIENT PHYSICAL THERAPY: Daily Treatment Note 2021    ICD-10: Treatment Diagnosis: right shoulder joint replacement surgery (Z96.611)                Treatment Diagnosis 2: right shoulder pain (M25.511)                Treatment Diagnosis 3: edema (R60.9)  Precautions: osteopenia, history of active prostate cancer, remission for thyroid cancer, and bladder cancer, FALLS RISK  Allergies: Patient has no known allergies. TREATMENT PLAN:  Effective Dates: 2021 TO 2021  Frequency/Duration: 2 times a week for 12 weeks MEDICAL/REFERRING DIAGNOSIS:  Aftercare following right shoulder joint replacement surgery [Z47.1, Z96.611]   DATE OF ONSET: s/p reverse right shoulder arthroplasty with a Delta Xtend prosthesis, latissimus dorsi and teres major tendon transfer 3/25/2021  REFERRING PHYSICIAN: Kingston White MD MD Orders: Evaluate and Treat   Return MD Appointment: unknown - might be first week in May 2021  Physician Guideline:  Week 0-6: Sling at all times except for therapy for 3 weeks, sling as needed for an additional 3 weeks, pendulums to warm up, PROM progressing to AAROM, No IR, no biceps contraction        Weeks 1-3:supine ER 0-30, elevation to 90 degrees, isometrics abd, ER, IR        Weeks 4-6: supine ER and elevation gradual increase to full, continue above        Week 7-9: continue ROM with addition of AROM, start IR, continue above        Week 10: start strengthening     Pre-treatment Symptoms/Complaints:  Patient reported no new complaints. Has been working on cars but has been trying to be careful.     Pain: Initial: Pain Intensity 1: 3  Pain Location 1: Shoulder  Pain Orientation 1: Right  Post Session:  0/10   Medications Last Reviewed:  4/29/2021  Updated Objective Findings:  see table below   PROM in degrees   Left Right   Shoulder flexion 170° 140 (from 90°)   Shoulder abduction  170° 140 (from 90°)   Shoulder internal rotation (IR)  (measured at 45° abduction) 80° 50 (From 30°)   Shoulder external rotation (ER)  (measured 0° abduction) 30° 70 (from 30°)   ER (measured at 90° degrees of abduction) NT° 90 (from 80°)   Apley's scratch test (functional IR AROM)  NT NT       TREATMENT:   THERAPEUTIC EXERCISE: (53 minutes):  Exercises per grid below to improve mobility, strength, balance and coordination. Required minimal visual, verbal and manual cues to promote proper body alignment, promote proper body posture, promote proper body mechanics and promote proper body breathing techniques. Progressed resistance, range, repetitions and complexity of movement as indicated.      Date:  4/22/2021 Date:  4/27/21 Date:  4-29-21   Activity/Exercise Parameters Parameters Parameters   Supine stick ER 10 sec x 10 10 sec x 10 10 sec x 10   Seated scapular retraction Good posture 3 x 10 3x10 reps good posture 3 x 10   Seated shrug Good posture, 3 x 10 3x10 reps with good posture 3 x 10   pendulums Circles, front to back, side to side, x 20 each way, 2lbs X 20 reps each in all 4 directions  X 20 reps in each direction   Isometric - abduction, IR, ER, adduction 10 sec x 20 With yellow ball at wall 2x10 reps of each exercise 5 sec hold  2 x 10 5 sec hold   Supine stick flexion 10 sec x 10 10 sec x 10 10 sec x 20   Seated ball roll Blue ball seated chair 10 sec x 10 Blue ball seated in chair x 20 reps BUE's  Blue ball seated in chair 10 sec x 20   Pulley  10 sec x 10 10 sec x 20 10 sec x 20   Finger ladder 10 sec x 10 10 sec x 10 10 sec x 10   PROM  All ranges - 10 minutes All ranges - 10 minutes  Forward elevation and external with  Rotation and gentle oscillation x 20 minutes     Time spent with patient reviewing proper muscle recruitment and technique with exercises. MANUAL THERAPY:(0  minutes): Joint mobilization and Soft tissue mobilization was utilized and necessary because of the patient's restricted joint motion and restricted motion of soft tissue  Pt. Received PROM to right shoulder in all planes as per MD guidelines. Gentle distraction and oscillation. (no charge)    MODALITIES: (0 minutes):      seated vasopneumatic with pillow under arm to right shoulder for swelling and pain (no charge)  HEP: As above; handouts given to patient for all exercises. Treatment/Session Summary:    · Response to Treatment:  Patient's rotation full and flexion and abduction with limitations that continue to improve. .  · Communication/Consultation:  None today  · Equipment provided today:  None today  · Recommendations/Intent for next treatment session: Next visit will focus on following guideline above, modalities, exercises, manual therapy as needed.     Total Treatment Billable Duration:  53 mins   PT Patient Time In/Time Out  Time In: 3837  Time Out: 8623 [facility],

## 2021-05-04 ENCOUNTER — HOSPITAL ENCOUNTER (OUTPATIENT)
Dept: PHYSICAL THERAPY | Age: 71
Discharge: HOME | End: 2021-05-04
Payer: MEDICARE

## 2021-05-10 ENCOUNTER — HOSPITAL ENCOUNTER (OUTPATIENT)
Dept: PHYSICAL THERAPY | Age: 71
Discharge: HOME | End: 2021-05-10
Payer: MEDICARE

## 2021-05-10 PROCEDURE — 97110 THERAPEUTIC EXERCISES: CPT

## 2021-05-10 NOTE — PROGRESS NOTES
Hernán Leal  : 1950  [place]  [place]: [place] [facility] at [facility]  [address] [place], [address] [place] [address], Mahamed reed, [address] [place] [address] Street  [phone]   [phone]      OUTPATIENT PHYSICAL THERAPY: Daily Treatment Note 5/10/2021    ICD-10: Treatment Diagnosis: right shoulder joint replacement surgery (Z96.611)                Treatment Diagnosis 2: right shoulder pain (M25.511)                Treatment Diagnosis 3: edema (R60.9)  Precautions: osteopenia, history of active prostate cancer, remission for thyroid cancer, and bladder cancer, FALLS RISK  Allergies: Patient has no known allergies. TREATMENT PLAN:  Effective Dates: 2021 TO 2021  Frequency/Duration: 2 times a week for 12 weeks MEDICAL/REFERRING DIAGNOSIS:  Aftercare following right shoulder joint replacement surgery [Z47.1, Z96.611]   DATE OF ONSET: s/p reverse right shoulder arthroplasty with a Delta Xtend prosthesis, latissimus dorsi and teres major tendon transfer 3/25/2021  REFERRING PHYSICIAN: Carlos Hernandes MD MD Orders: Evaluate and Treat   Return MD Appointment: unknown - might be first week in May 2021  Physician Guideline:  Week 0-6: Sling at all times except for therapy for 3 weeks, sling as needed for an additional 3 weeks, pendulums to warm up, PROM progressing to AAROM, No IR, no biceps contraction        Weeks 1-3:supine ER 0-30, elevation to 90 degrees, isometrics abd, ER, IR        Weeks 4-6: supine ER and elevation gradual increase to full, continue above        Week 7-9: continue ROM with addition of AROM, start IR, continue above        Week 10: start strengthening     Pre-treatment Symptoms/Complaints:  Patient reported improvements overall and sees MD tomorrow. He was out sick and unable to do his exercises regularly at home.     Pain: Initial: Pain Intensity 1: 3  Pain Location 1: Shoulder  Pain Orientation 1: Right  Post Session: 0/10   Medications Last Reviewed:  5/10/2021  Updated Objective Findings:  see table below   PROM in degrees   Left Right   Shoulder flexion 170° 140 (from 90°)   Shoulder abduction  170° 140 (from 90°)   Shoulder internal rotation (IR)  (measured at 45° abduction) 80° 50 (From 30°)   Shoulder external rotation (ER)  (measured 0° abduction) 30° 70 (from 30°)   ER (measured at 90° degrees of abduction) NT° 90 (from 80°)   Apley's scratch test (functional IR AROM)  NT NT       TREATMENT:   THERAPEUTIC EXERCISE: (53 minutes):  Exercises per grid below to improve mobility, strength, balance and coordination. Required minimal visual, verbal and manual cues to promote proper body alignment, promote proper body posture, promote proper body mechanics and promote proper body breathing techniques. Progressed resistance, range, repetitions and complexity of movement as indicated.      Date:  5/10/2021 Date:  4/27/21 Date:  4-29-21   Activity/Exercise Parameters Parameters Parameters   UBE NEXT SESSION PLEASE     Band pulldowns NEXT SESSION PLEASE     Band rows NEXT SESSION PLEASE     IR strap NEXT SESSION PLEASE                 Supine stick ER 10 sec x 10 10 sec x 10 10 sec x 10   Seated scapular retraction Good posture 3 x 10 3x10 reps good posture 3 x 10   Seated shrug Good posture, 3 x 10 3x10 reps with good posture 3 x 10   pendulums Circles, front to back, side to side, x 20 each way, 2lbs X 20 reps each in all 4 directions  X 20 reps in each direction   Isometric - abduction, IR, ER, adduction 10 sec x 20 With yellow ball at wall 2x10 reps of each exercise 5 sec hold  2 x 10 5 sec hold   Supine stick flexion 10 sec x 10 10 sec x 10 10 sec x 20   Seated ball roll Blue ball seated chair 10 sec x 10 Blue ball seated in chair x 20 reps BUE's  Blue ball seated in chair 10 sec x 20   Pulley  10 sec x 10 10 sec x 20 10 sec x 20   Finger ladder 10 sec x 10 10 sec x 10 10 sec x 10   PROM  All ranges - 10 minutes All ranges - 10 minutes  Forward elevation and external with  Rotation and gentle oscillation x 20 minutes     Time spent with patient reviewing proper muscle recruitment and technique with exercises. MANUAL THERAPY:(0 minutes): Joint mobilization and Soft tissue mobilization was utilized and necessary because of the patient's restricted joint motion and restricted motion of soft tissue  Pt. Received PROM to right shoulder in all planes as per MD guidelines. Gentle distraction and oscillation. (no charge)    MODALITIES: (0 minutes):      seated vasopneumatic with pillow under arm to right shoulder for swelling and pain (no charge)  HEP: As above; handouts given to patient for all exercises. Treatment/Session Summary:    · Response to Treatment:  ROM continues to improve. Will start IR and rubber bands once patients is cleared by MD..  · Communication/Consultation:  None today  · Equipment provided today:  None today  · Recommendations/Intent for next treatment session: Next visit will focus on following guideline above, modalities, exercises, manual therapy as needed.     Total Treatment Billable Duration:  53 mins   PT Patient Time In/Time Out  Time In: 1000  Time Out: 1200 [address], [address] [facility]

## 2021-05-10 NOTE — PROGRESS NOTES
Marissa Brown  : 1950  [place]  Secondary: [place] Of [facility] at [address], [facility], [address] [place] [address]  [phone]   [phone]       OUTPATIENT PHYSICAL THERAPY:Progress Report 5/10/2021    ICD-10: Treatment Diagnosis: right shoulder joint replacement surgery (Z96.611)                Treatment Diagnosis 2: right shoulder pain (M25.511)                Treatment Diagnosis 3: edema (R60.9)  Precautions: osteopenia, history of active prostate cancer, remission for thyroid cancer, and bladder cancer, FALLS RISK  Allergies: Patient has no known allergies. TREATMENT PLAN:  Effective Dates: 2021 TO 2021  Frequency/Duration: 2 times a week for 12 weeks MEDICAL/REFERRING DIAGNOSIS:  Aftercare following joint replacement surgery [Z47.1]  Presence of right artificial shoulder joint [Z96.611]   DATE OF ONSET: s/p reverse right shoulder arthroplasty with a Delta Xtend prosthesis, latissimus dorsi and teres major tendon transfer 3/25/2021  REFERRING PHYSICIAN: Oscar Lemus MD MD Orders: Evaluate and Treat   Return MD Appointment: unknown - might be first week in May 2021  Physician Guideline:  Week 0-6: Sling at all times except for therapy for 3 weeks, sling as needed for an additional 3 weeks, pendulums to warm up, PROM progressing to AAROM, No IR, no biceps contraction        Weeks 1-3:supine ER 0-30, elevation to 90 degrees, isometrics abd, ER, IR        Weeks 4-6: supine ER and elevation gradual increase to full, continue above        Week 7-9: continue ROM with addition of AROM, start IR, continue above        Week 10: start strengthening     INITIAL ASSESSMENT:  Mr. Vivienne Nicholas is a 69 y.o. male presenting to physical therapy s/p reverse right shoulder arthroplasty with a Delta Xtend prosthesis, latissimus dorsi and teres major tendon transfer 3/25/2021.   Patient reported no complications with surgery and was inpatient for 1 day. He underwent home therapy for 6 sessions without complaints. Patient is eager to car racing, working on cars, remodeling his house, and yard work. PROGRESS NOTE (5/10/2021):  Patient has been seen for 7 visits of therapy 4/9/2021 to 5/10/2021 s/p reverse right shoulder arthroplasty with a Delta Xtend prosthesis, latissimus dorsi and teres major tendon transfer 3/25/2021. Patient is eager to improve strength and IR once cleared by MD.  Patient is a good candidate for skilled intervention with services to include manual therapy, modalities as needed, therapeutic exercises, postural re-education, and activity modification. PROBLEM LIST (Impacting functional limitations):  1. Decreased Strength  2. Decreased ADL/Functional Activities  3. Decreased Transfer Abilities  4. Increased Pain  5. Increased Fatigue  6. Increased Shortness of Breath  7. Decreased Flexibility/Joint Mobility  8. Edema/Girth INTERVENTIONS PLANNED: (Treatment may consist of any combination of the following)  1. Cold  2. Cryotherapy  3. Decongestion Therapy  4. Electrical Stimulation  5. Heat  6. Home Exercise Program (HEP)  7. Manual Therapy  8. Neuromuscular Re-education/Strengthening  9. Range of Motion (ROM)  10. Therapeutic Exercise/Strengthening  11. Transcutaneous Electrical Nerve Stimulation (TENS)  12. Transfer Training     GOALS: (Goals have been discussed and agreed upon with patient.)  Short-Term Functional Goals: Time Frame: 4/9/2021 to 5/20/2021  1. Patient demonstrates independence with home exercise program without verbal cueing provided by therapist. - GOAL MET  2. Improve seated posture with decreased forward head, forward shoulders, and thoracic kyphosis with self cuing and use of lumbar roll. - GOAL MET  3. Improve PROM ER (at 0 degrees abduction) to at least 50 degrees, elevation to at least 140. - GOAL MET  Discharge Goals: Time Frame: 4/9/2021 to 7/1/2021  1.  Improve pain to 3/10 at the most with work building and racing race cars, household chores, ADLs, house remodel, and yard work. - ONGOING  2. Improve PROM ER (at 0 degrees abduction) to at least 60 degrees, elevation to at least 150, IR to 70. - ONGOING  3. Improve Apley's IR to belt line in order to don/doff belt and put wallet into back pocket. - ONGOING  4. Improve strength to at least 4/5 in order to improve tolerance to lifting/carrying, pushing/pulling, work with racing and building cars. - ONGOING  5. Improve tenderness to palpation, tightness, and swelling of incision and proximal arm in order to improve sensitivity and pain. - ONGOING  6. Improve AROM elevation to at least 90 degrees with minimal scapular hike. - ONGOING  7. Improve Disabilities of the Arm, Shoulder, and Hand Index score to 30/55. - ONGOING    Outcome Measure: Tool Used: Disabilities of the Arm, Shoulder and Hand (DASH) Questionnaire - Quick Version  Score:  Initial: 43/55  Most Recent: 33/55 (Date: 5/10/2021)   Interpretation of Score: The DASH is designed to measure the activities of daily living in person's with upper extremity dysfunction or pain. Each section is scored on a 1-5 scale, 5 representing the greatest disability. The scores of each section are added together for a total score of 55. OBJECTIVE MEASUREMENTS:    Observation/Postural and Gait Assessment:  Patient sits with moderate (From significant) forward head, forward shoulders, and thoracic kyphosis that are reversible with cuing. Patient has a large anterior proximal humerus incision that is completely approximated with scar tissue (from scar tissue, scabbing, and surgical glue). No signs or symptoms of infection or deep vein thrombosis are noted at this time. Palpation:  Gross tenderness to palpation of right shoulder with palpable swelling of the proximal humerus and anterior chest.  Flexibility moderately limited of pectoralis minor and major.     ROM: NT: not tested  PROM in degrees Left Right   Shoulder flexion 170° 140 (from 90°)   Shoulder abduction  170° 140 (From 90°)   Shoulder internal rotation (IR)  (measured at 45° abduction) 80° 30°   Shoulder external rotation (ER)  (measured 0° abduction) 30° 70 (From 30°)   ER (measured at 90° degrees of abduction) NT° 90°   Apley's scratch test (functional IR AROM) NT NT     Passive Accessory Motion: Grossly hypomobile for the right shoulder joint but improved since start of therapy. Strength:   Manual Muscle Test (out of 5) Left Right   Shoulder scaption At least 3 NT   Shoulder ER At least 3 NT   Shoulder IR At least 3 NT   Teres Minor NT NT   Infraspinatus NT NT     Functional Mobility:  Patient is safe and independent with left upper extremity tasks but requires assistance with bilateral upper extremity tasks. Ambulatory/Rehab Services H2 Model Falls Risk Assessment   Risk Factors:       (1)  Gender [Male]       (5)  History of Recent Falls [w/in 3 months] Ability to Rise from Chair:       (1)  Pushes up, successful in one attempt   Falls Prevention Plan:       Physical Limitations to Exercise (specify):  supervise patient throughout sessions   Total: (5 or greater = High Risk): 7   ©2010 [facility] of [place]ie [place]. [facility] [place] [facility] States Patent #[id number]. Federal Law prohibits the replication, distribution or use without written permission from [facility] eliana Klein Necessity:   · Patient is expected to demonstrate progress in strength, range of motion, balance and coordination to improve tolerance to lifting, carrying, pushing, pulling as necessary for yard work, working on and racing cars, and household chores/projects. · Skilled intervention continues to be required due to weakness, decreased ROM, pain, functional limitations, and inability to work. Reason for Services/Other Comments:  · Patient continues to require skilled intervention due to increasing complexity of exercises.     Rehabilitation Potential For Stated Goals: Good  Regarding Yamel Best's therapy, I certify that the treatment plan above will be carried out by a therapist or under their direction.   Thank you for this referral,  Tariq Muñoz, PT

## 2021-05-10 NOTE — PROGRESS NOTES
Ragini Edwards  : 1950  Primary: Stony River[place] miguel [place]  Secondary: [place] Of [facility] at [facility]  [address] [facility], [address] [place] [address], Mahamed reed, [address] [place] [address] Street  [phone]   [phone]      Outpatient PHYSICAL THERAPY: PHYSICIAN COMMUNICATION    REFERRING PHYSICIAN: Holli Ramírez MD  Return Physician Appointment: 2021  MEDICAL/REFERRING DIAGNOSIS:  · Aftercare following joint replacement surgery [Z47.1]  · Presence of right artificial shoulder joint [Z96.611]  ATTENDANCE: Ragini Edwards has attended 7 visits of therapy 2021 to 5/10/2021 s/p reverse right shoulder arthroplasty with a Delta Xtend prosthesis, latissimus dorsi and teres major tendon transfer 3/25/2021    ASSESSMENT:  DATE: 5/10/2021    PROGRESS: Ragini Edwards has made significant progress since the start of therapy. His passive elevation is 140, ER (at 0 degrees abduction) 70, active scaption to 90 with mild to moderate scapular hike. 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   We have not done any IR or biceps contraction until follow up with MD.    Thank you for this referral and please do not hesitate to contact me at the number listed above if you have any questions,    Malik Chilel, PT, DPT, OCS, SCS  Physical Therapist

## 2021-05-12 ENCOUNTER — HOSPITAL ENCOUNTER (OUTPATIENT)
Dept: PHYSICAL THERAPY | Age: 71
Discharge: HOME | End: 2021-05-12
Payer: MEDICARE

## 2021-05-12 PROCEDURE — 97110 THERAPEUTIC EXERCISES: CPT

## 2021-05-12 NOTE — PROGRESS NOTES
Mingo Sarmiento  : 1950  [place]: [place]  Secondary: [place] Of [facility] at [facility]  [address] [place], [address] [place] [address], [facility], [address] [place] [address] Street  [phone]   [phone]      OUTPATIENT PHYSICAL THERAPY: Daily Treatment Note 2021    ICD-10: Treatment Diagnosis: right shoulder joint replacement surgery (Z96.611)                Treatment Diagnosis 2: right shoulder pain (M25.511)                Treatment Diagnosis 3: edema (R60.9)  Precautions: osteopenia, history of active prostate cancer, remission for thyroid cancer, and bladder cancer, FALLS RISK  Allergies: Patient has no known allergies. TREATMENT PLAN:  Effective Dates: 2021 TO 2021  Frequency/Duration: 2 times a week for 12 weeks MEDICAL/REFERRING DIAGNOSIS:  Aftercare following right shoulder joint replacement surgery [Z47.1, Z96.611]   DATE OF ONSET: s/p reverse right shoulder arthroplasty with a Delta Xtend prosthesis, latissimus dorsi and teres major tendon transfer 3/25/2021  REFERRING PHYSICIAN: Austin Matthews MD MD Orders: Evaluate and Treat   Return MD Appointment: unknown - might be first week in May 2021  Physician Guideline:  Week 0-6: Sling at all times except for therapy for 3 weeks, sling as needed for an additional 3 weeks, pendulums to warm up, PROM progressing to AAROM, No IR, no biceps contraction        Weeks 1-3:supine ER 0-30, elevation to 90 degrees, isometrics abd, ER, IR        Weeks 4-6: supine ER and elevation gradual increase to full, continue above        Week 7-9: continue ROM with addition of AROM, start IR, continue above        Week 10: start strengthening     Pre-treatment Symptoms/Complaints:  Patient reported MD was happy with his progress and OK to full strength and full motion.     Pain: Initial: Pain Intensity 1: 2  Pain Location 1: Shoulder  Pain Orientation 1: Right  Post Session:  0/10   Medications Last Reviewed: 5/12/2021  Updated Objective Findings:  see table below   PROM in degrees   Left Right   Shoulder flexion 170° 140 (from 90°)   Shoulder abduction  170° 140 (from 90°)   Shoulder internal rotation (IR)  (measured at 45° abduction) 80° 50 (From 30°)   Shoulder external rotation (ER)  (measured 0° abduction) 30° 70 (from 30°)   ER (measured at 90° degrees of abduction) NT° 90 (from 80°)   Apley's scratch test (functional IR AROM)  NT NT       TREATMENT:   THERAPEUTIC EXERCISE: (53 minutes):  Exercises per grid below to improve mobility, strength, balance and coordination. Required minimal visual, verbal and manual cues to promote proper body alignment, promote proper body posture, promote proper body mechanics and promote proper body breathing techniques. Progressed resistance, range, repetitions and complexity of movement as indicated. Date:  5/10/2021 Date:  5/12/21 Date:  4-29-21   Activity/Exercise Parameters Parameters Parameters   UBE NEXT SESSION PLEASE 3 forward, 3 backward    Band pulldowns NEXT SESSION PLEASE Red 2 x 10    Band rows NEXT SESSION PLEASE Red 2 x 10    Band ER and IR --- NEXT SESSION PLEASE          IR strap NEXT SESSION PLEASE 5 x 30 sec                Supine stick ER 10 sec x 10 10 sec x 20 10 sec x 10   Supine stick flexion 10 sec x 10 10 sec x 20 10 sec x 20   Seated ball roll Blue ball seated chair 10 sec x 10 Blue ball seated in chair x 20 reps BUE's  Blue ball seated in chair 10 sec x 20   Pulley  10 sec x 10 10 sec x 20 10 sec x 20   Finger ladder 10 sec x 10 10 sec x 10 10 sec x 10   PROM  All ranges - 10 minutes All ranges - 10 minutes  Forward elevation and external with  Rotation and gentle oscillation x 20 minutes     Time spent with patient reviewing proper muscle recruitment and technique with exercises.      MANUAL THERAPY:(0 minutes): Joint mobilization and Soft tissue mobilization was utilized and necessary because of the patient's restricted joint motion and restricted motion of soft tissue  Pt. Received PROM to right shoulder in all planes as per MD guidelines. Gentle distraction and oscillation. (no charge)    MODALITIES: (0 minutes):      seated vasopneumatic with pillow under arm to right shoulder for swelling and pain (no charge)  HEP: As above; handouts given to patient for all exercises. Treatment/Session Summary:    · Response to Treatment:  Will start IR and ER band next session. Tolerated new exercises well. .  · Communication/Consultation:  None today  · Equipment provided today:  None today  · Recommendations/Intent for next treatment session: Next visit will focus on following guideline above, modalities, exercises, manual therapy as needed.     Total Treatment Billable Duration:  53 mins   PT Patient Time In/Time Out  Time In: 1845  Time Out: [address], [place]

## 2021-05-19 ENCOUNTER — HOSPITAL ENCOUNTER (OUTPATIENT)
Dept: PHYSICAL THERAPY | Age: 71
Discharge: HOME | End: 2021-05-19
Payer: MEDICARE

## 2021-05-19 PROCEDURE — 97110 THERAPEUTIC EXERCISES: CPT

## 2021-05-19 NOTE — PROGRESS NOTES
Ophelia Morales  : 1950  [place]  [place]: [place] Of [facility] at [facility]  [address] [place], [address] [place] [address], Mahamed reed, [address] [place] [address] Street  [phone]   [phone]      OUTPATIENT PHYSICAL THERAPY: Daily Treatment Note 2021    ICD-10: Treatment Diagnosis: right shoulder joint replacement surgery (Z96.611)                Treatment Diagnosis 2: right shoulder pain (M25.511)                Treatment Diagnosis 3: edema (R60.9)  Precautions: osteopenia, history of active prostate cancer, remission for thyroid cancer, and bladder cancer, FALLS RISK  Allergies: Patient has no known allergies. TREATMENT PLAN:  Effective Dates: 2021 TO 2021  Frequency/Duration: 2 times a week for 12 weeks MEDICAL/REFERRING DIAGNOSIS:  Aftercare following right shoulder joint replacement surgery [Z47.1, Z96.611]   DATE OF ONSET: s/p reverse right shoulder arthroplasty with a Delta Xtend prosthesis, latissimus dorsi and teres major tendon transfer 3/25/2021  REFERRING PHYSICIAN: Trell Nunn MD MD Orders: Evaluate and Treat   Return MD Appointment: unknown - might be first week in May 2021  Physician Guideline:  Week 0-6: Sling at all times except for therapy for 3 weeks, sling as needed for an additional 3 weeks, pendulums to warm up, PROM progressing to AAROM, No IR, no biceps contraction        Weeks 1-3:supine ER 0-30, elevation to 90 degrees, isometrics abd, ER, IR        Weeks 4-6: supine ER and elevation gradual increase to full, continue above        Week 7-9: continue ROM with addition of AROM, start IR, continue above        Week 10: start strengthening     Pre-treatment Symptoms/Complaints:  Patient reported he is a little sore in the back today.     Pain: Initial: Pain Intensity 1: 2  Pain Location 1: Shoulder  Pain Orientation 1: Right  Post Session:  0/10   Medications Last Reviewed:  2021  Updated Objective Findings:  see table below   PROM in degrees   Left Right   Shoulder flexion 170° 140 (from 90°)   Shoulder abduction  170° 140 (from 90°)   Shoulder internal rotation (IR)  (measured at 45° abduction) 80° 50 (From 30°)   Shoulder external rotation (ER)  (measured 0° abduction) 30° 70 (from 30°)   ER (measured at 90° degrees of abduction) NT° 90 (from 80°)   Apley's scratch test (functional IR AROM)  NT NT       TREATMENT:   THERAPEUTIC EXERCISE: (53 minutes):  Exercises per grid below to improve mobility, strength, balance and coordination. Required minimal visual, verbal and manual cues to promote proper body alignment, promote proper body posture, promote proper body mechanics and promote proper body breathing techniques. Progressed resistance, range, repetitions and complexity of movement as indicated. Date:  5/10/2021 Date:  5/12/21 Date:  4-29-21   Activity/Exercise Parameters Parameters Parameters   UBE NEXT SESSION PLEASE 3 forward, 3 backward 3 forward, 3 backward   Band pulldowns NEXT SESSION PLEASE Red 2 x 10 Red 2 x 10   Band rows NEXT SESSION PLEASE Red 2 x 10 Red 2 x 10   Band ER and IR --- NEXT SESSION PLEASE Yellow 2 x 10   Band biceps --- Yellow 2 x 10    Band triceps pulldown --- Red 2 x 10    IR strap NEXT SESSION PLEASE 5 x 30 sec    Supine punch --- 2 x 10    Supine flexion (elbow straight) --- 2 x 10    Supine stick ER 10 sec x 10 10 sec x 20 10 sec x 10   Supine stick flexion 10 sec x 10 10 sec x 20 10 sec x 20   Seated ball roll Blue ball seated chair 10 sec x 10 Blue ball seated in chair x 20 reps BUE's  Blue ball seated in chair 10 sec x 20   Pulley  10 sec x 10 10 sec x 20 10 sec x 20   Finger ladder 10 sec x 10 10 sec x 10 10 sec x 10   PROM  All ranges - 10 minutes --- ---     Time spent with patient reviewing proper muscle recruitment and technique with exercises.      MANUAL THERAPY:(0 minutes): Joint mobilization and Soft tissue mobilization was utilized and necessary because of the patient's restricted joint motion and restricted motion of soft tissue  Pt. Received PROM to right shoulder in all planes as per MD guidelines. Gentle distraction and oscillation. (no charge)    MODALITIES: (0 minutes):      seated vasopneumatic with pillow under arm to right shoulder for swelling and pain (no charge)  HEP: As above; handouts given to patient for all exercises. Treatment/Session Summary:    · Response to Treatment:  Patient requires cues for proper exercise performance but otherwise tolerated session well. .  · Communication/Consultation:  None today  · Equipment provided today:  None today  · Recommendations/Intent for next treatment session: Next visit will focus on following guideline above, modalities, exercises, manual therapy as needed.     Total Treatment Billable Duration:  53 mins   PT Patient Time In/Time Out  Time In: 0900  Time Out: [address], PT

## 2021-05-21 ENCOUNTER — HOSPITAL ENCOUNTER (OUTPATIENT)
Dept: PHYSICAL THERAPY | Age: 71
Discharge: HOME | End: 2021-05-21
Payer: MEDICARE

## 2021-05-24 ENCOUNTER — HOSPITAL ENCOUNTER (OUTPATIENT)
Dept: PHYSICAL THERAPY | Age: 71
Discharge: HOME | End: 2021-05-24
Payer: MEDICARE

## 2021-05-24 PROCEDURE — 97110 THERAPEUTIC EXERCISES: CPT

## 2021-05-24 NOTE — PROGRESS NOTES
Claudio Obrien  : 1950  Primary: [place]  Secondary: [place] Of [facility] at [facility]  [address] [place], [address] [place] [address], [facility], [address] [place] [address] Street  [phone]   [phone]      OUTPATIENT PHYSICAL THERAPY: Daily Treatment Note 2021    ICD-10: Treatment Diagnosis: right shoulder joint replacement surgery (Z96.611)                Treatment Diagnosis 2: right shoulder pain (M25.511)                Treatment Diagnosis 3: edema (R60.9)  Precautions: osteopenia, history of active prostate cancer, remission for thyroid cancer, and bladder cancer, FALLS RISK  Allergies: Patient has no known allergies. TREATMENT PLAN:  Effective Dates: 2021 TO 2021  Frequency/Duration: 2 times a week for 12 weeks MEDICAL/REFERRING DIAGNOSIS:  Aftercare following right shoulder joint replacement surgery [Z47.1, Z96.611]   DATE OF ONSET: s/p reverse right shoulder arthroplasty with a Delta Xtend prosthesis, latissimus dorsi and teres major tendon transfer 3/25/2021  REFERRING PHYSICIAN: Liza Barajas MD MD Orders: Evaluate and Treat   Return MD Appointment: unknown - might be first week in May 2021  Physician Guideline:  Week 0-6: Sling at all times except for therapy for 3 weeks, sling as needed for an additional 3 weeks, pendulums to warm up, PROM progressing to AAROM, No IR, no biceps contraction        Weeks 1-3:supine ER 0-30, elevation to 90 degrees, isometrics abd, ER, IR        Weeks 4-6: supine ER and elevation gradual increase to full, continue above        Week 7-9: continue ROM with addition of AROM, start IR, continue above        Week 10: start strengthening     Pre-treatment Symptoms/Complaints:  Patient reported no pain today and no complaints overall.     Pain: Initial: Pain Intensity 1: 0  Pain Location 1: Shoulder  Pain Orientation 1: Right  Post Session:  0/10   Medications Last Reviewed:  2021  Updated Objective Findings:  see table below   PROM in degrees   Left Right   Shoulder flexion 170° 140 (from 90°)   Shoulder abduction  170° 140 (from 90°)   Shoulder internal rotation (IR)  (measured at 45° abduction) 80° 50 (From 30°)   Shoulder external rotation (ER)  (measured 0° abduction) 30° 70 (from 30°)   ER (measured at 90° degrees of abduction) NT° 90 (from 80°)   Apley's scratch test (functional IR AROM)  NT NT       TREATMENT:   THERAPEUTIC EXERCISE: (53 minutes):  Exercises per grid below to improve mobility, strength, balance and coordination. Required minimal visual, verbal and manual cues to promote proper body alignment, promote proper body posture, promote proper body mechanics and promote proper body breathing techniques. Progressed resistance, range, repetitions and complexity of movement as indicated.      Date:  5/10/2021 Date:  5/12/21 Date:  5-24-21   Activity/Exercise Parameters Parameters Parameters   UBE NEXT SESSION PLEASE 3 forward, 3 backward 3 forward, 3 backward   Band pulldowns NEXT SESSION PLEASE Red 2 x 10 Green 2 x 10   Band rows NEXT SESSION PLEASE Red 2 x 10 Green 2 x 10   Band ER and IR --- NEXT SESSION PLEASE Yellow 2 x 10   Band biceps --- Yellow 2 x 10 Red 2 x 10   Band triceps pulldown --- Red 2 x 10 Green 2 x 10i   Band horizontal abduction --- --- Red 2 x 10   IR strap NEXT SESSION PLEASE 5 x 30 sec 5 x 30 sec   Supine punch --- 2 x 10 2 lbs 2 x 10   Supine flexion (elbow straight) --- 2 x 10 1 lbs 2 x 10   Supine stick ER 10 sec x 10 10 sec x 20 10 sec x 10   Supine stick flexion 10 sec x 10 10 sec x 20 10 sec x 20   Seated ball roll Blue ball seated chair 10 sec x 10 Blue ball seated in chair x 20 reps BUE's  Green ball 10 sec x 10   Pulley  10 sec x 10 10 sec x 20 10 sec x 20   Finger ladder 10 sec x 10 10 sec x 10 10 sec x 10   PROM  All ranges - 10 minutes --- ---   Prone Y and T --- --- 2 x 10     Time spent with patient reviewing proper muscle recruitment and technique with exercises. MANUAL THERAPY:(0 minutes): Joint mobilization and Soft tissue mobilization was utilized and necessary because of the patient's restricted joint motion and restricted motion of soft tissue  Pt. Received PROM to right shoulder in all planes as per MD guidelines. Gentle distraction and oscillation. (no charge)    MODALITIES: (0 minutes):      seated vasopneumatic with pillow under arm to right shoulder for swelling and pain (no charge)  HEP: As above; handouts given to patient for all exercises. Treatment/Session Summary:    · Response to Treatment:  Patient requires a lot of cues for form due to poor body awareness. Continue to progress as tolerated with emphasis on strengthening  · Communication/Consultation:  None today  · Equipment provided today:  None today  · Recommendations/Intent for next treatment session: Next visit will focus on following guideline above, modalities, exercises, manual therapy as needed.     Total Treatment Billable Duration:  53 mins   PT Patient Time In/Time Out  Time In: 0900  Time Out: [address], PT

## 2021-05-26 NOTE — PROGRESS NOTES
I am accessing Mr. Fiona Soliz chart as a part of our department's internal chart auditing process. I certify that Mr. Mary Dumont is, or was, a patient in our department.   Thank you,  Lali Moeller, PT  5/26/2021

## 2021-05-28 ENCOUNTER — HOSPITAL ENCOUNTER (OUTPATIENT)
Dept: PHYSICAL THERAPY | Age: 71
Discharge: HOME | End: 2021-05-28
Payer: MEDICARE

## 2021-05-28 PROCEDURE — 97110 THERAPEUTIC EXERCISES: CPT

## 2021-05-28 NOTE — PROGRESS NOTES
Lucy Doss  : 1950  [place]: [place]  Secondary: [place] Of [facility]  [address] [place], [address] [place] [address], Mahamed reed, [address] [place] [address] Street  [phone]   [phone]      OUTPATIENT PHYSICAL THERAPY: Daily Treatment Note 2021    ICD-10: Treatment Diagnosis: right shoulder joint replacement surgery (Z96.611)                Treatment Diagnosis 2: right shoulder pain (M25.511)                Treatment Diagnosis 3: edema (R60.9)  Precautions: osteopenia, history of active prostate cancer, remission for thyroid cancer, and bladder cancer, FALLS RISK  Allergies: Patient has no known allergies. TREATMENT PLAN:  Effective Dates: 2021 TO 2021  Frequency/Duration: 2 times a week for 12 weeks MEDICAL/REFERRING DIAGNOSIS:  Aftercare following right shoulder joint replacement surgery [Z47.1, Z96.611]   DATE OF ONSET: s/p reverse right shoulder arthroplasty with a Delta Xtend prosthesis, latissimus dorsi and teres major tendon transfer 3/25/2021  REFERRING PHYSICIAN: Irais Andres MD MD Orders: Evaluate and Treat   Return MD Appointment: unknown - might be first week in May 2021  Physician Guideline:  Week 0-6: Sling at all times except for therapy for 3 weeks, sling as needed for an additional 3 weeks, pendulums to warm up, PROM progressing to AAROM, No IR, no biceps contraction        Weeks 1-3:supine ER 0-30, elevation to 90 degrees, isometrics abd, ER, IR        Weeks 4-6: supine ER and elevation gradual increase to full, continue above        Week 7-9: continue ROM with addition of AROM, start IR, continue above        Week 10: start strengthening     Pre-treatment Symptoms/Complaints:  Patient reported improvements overall and less pain in the shoulder.     Pain: Initial: Pain Intensity 1: 0  Pain Location 1: Shoulder  Pain Orientation 1: Right  Post Session:  0/10   Medications Last Reviewed:  2021  Updated Objective Findings:  see table below   PROM in degrees   Left Right   Shoulder flexion 170° 140 (from 90°)   Shoulder abduction  170° 140 (from 90°)   Shoulder internal rotation (IR)  (measured at 45° abduction) 80° 50 (From 30°)   Shoulder external rotation (ER)  (measured 0° abduction) 30° 70 (from 30°)   ER (measured at 90° degrees of abduction) NT° 90 (from 80°)   Apley's scratch test (functional IR AROM)  NT NT       TREATMENT:   THERAPEUTIC EXERCISE: (60 minutes):  Exercises per grid below to improve mobility, strength, balance and coordination. Required minimal visual, verbal and manual cues to promote proper body alignment, promote proper body posture, promote proper body mechanics and promote proper body breathing techniques. Progressed resistance, range, repetitions and complexity of movement as indicated.      Date:  5/28/2021 Date:  5/12/21 Date:  5-24-21   Activity/Exercise Parameters Parameters Parameters   UBE 4 minutes forward, 4 minutes backward 3 forward, 3 backward 3 forward, 3 backward   Band pulldowns Blue 2 x 10 Red 2 x 10 Green 2 x 10   Band rows Blue 2 x 10 Red 2 x 10 Green 2 x 10   Band ER and IR Yellow 2 x 10 NEXT SESSION PLEASE Yellow 2 x 10   Band biceps 3 lbs 3 x 10 Yellow 2 x 10 Red 2 x 10   Band triceps pulldown Green 3 x 10 Red 2 x 10 Green 2 x 10i   Band horizontal abduction Red 2 x 10 --- Red 2 x 10   IR strap 5 x 30 sec 5 x 30 sec 5 x 30 sec   Supine punch 3 lbs 2 x 10 2 x 10 2 lbs 2 x 10   Supine flexion (elbow straight) 1 lbs 2 x 10 2 x 10 1 lbs 2 x 10   Supine stick ER 10 sec x 10 10 sec x 20 10 sec x 10   Standing stick flexion Standing 5 sec 2 x 10 pushing arm up 10 sec x 20 10 sec x 20   Seated ball roll Blue ball seated chair 10 sec x 10 Blue ball seated in chair x 20 reps BUE's  Green ball 10 sec x 10   Pulley  10 sec x 10 10 sec x 20 10 sec x 20   Finger ladder 10 sec x 10 10 sec x 10 10 sec x 10   PROM  --- --- ---   Prone Y and T 2 x 10 unilateral --- 2 x 10     Time spent with patient reviewing proper muscle recruitment and technique with exercises. MANUAL THERAPY:(0 minutes): Joint mobilization and Soft tissue mobilization was utilized and necessary because of the patient's restricted joint motion and restricted motion of soft tissue  Pt. Received PROM to right shoulder in all planes as per MD guidelines. Gentle distraction and oscillation. (no charge)    MODALITIES: (0 minutes):      seated vasopneumatic with pillow under arm to right shoulder for swelling and pain (no charge)  HEP: As above; handouts given to patient for all exercises. Treatment/Session Summary:    · Response to Treatment:  Patient requires a lot of cues for form due to poor body awareness. Continue to progress as tolerated with emphasis on strengthening  · Communication/Consultation:  None today  · Equipment provided today:  None today  · Recommendations/Intent for next treatment session: Next visit will focus on following guideline above, modalities, exercises, manual therapy as needed.     Total Treatment Billable Duration:  60 mins   PT Patient Time In/Time Out  Time In: 0900  Time Out: [address], PT

## 2021-06-02 ENCOUNTER — HOSPITAL ENCOUNTER (OUTPATIENT)
Dept: PHYSICAL THERAPY | Age: 71
Discharge: HOME | End: 2021-06-02
Payer: MEDICARE

## 2021-06-02 PROCEDURE — 97110 THERAPEUTIC EXERCISES: CPT

## 2021-06-02 NOTE — PROGRESS NOTES
Shane Kapoor  : 1950  [place]  Secondary: [place] Of [facility] at [facility]  [address] [place], [address] [place] [address], Mahamed reed, [address] [place] [address] Street  [phone]   [phone]      OUTPATIENT PHYSICAL THERAPY: Daily Treatment Note 2021    ICD-10: Treatment Diagnosis: right shoulder joint replacement surgery (Z96.611)                Treatment Diagnosis 2: right shoulder pain (M25.511)                Treatment Diagnosis 3: edema (R60.9)  Precautions: osteopenia, history of active prostate cancer, remission for thyroid cancer, and bladder cancer, FALLS RISK  Allergies: Patient has no known allergies. TREATMENT PLAN:  Effective Dates: 2021 TO 2021  Frequency/Duration: 2 times a week for 12 weeks MEDICAL/REFERRING DIAGNOSIS:  Aftercare following right shoulder joint replacement surgery [Z47.1, Z96.611]   DATE OF ONSET: s/p reverse right shoulder arthroplasty with a Delta Xtend prosthesis, latissimus dorsi and teres major tendon transfer 3/25/2021  REFERRING PHYSICIAN: Tyree Nunes MD MD Orders: Evaluate and Treat   Return MD Appointment: unknown - might be first week in May 2021  Physician Guideline:  Week 0-6: Sling at all times except for therapy for 3 weeks, sling as needed for an additional 3 weeks, pendulums to warm up, PROM progressing to AAROM, No IR, no biceps contraction        Weeks 1-3:supine ER 0-30, elevation to 90 degrees, isometrics abd, ER, IR        Weeks 4-6: supine ER and elevation gradual increase to full, continue above        Week 7-9: continue ROM with addition of AROM, start IR, continue above        Week 10: start strengthening     Pre-treatment Symptoms/Complaints:  Patient reported improvements overall and no complaints. Patient only reported fatigued after the last session.     Pain: Initial: Pain Intensity 1: 0  Pain Location 1: Shoulder  Pain Orientation 1: Right  Post Session:  0/10 Medications Last Reviewed:  6/2/2021  Updated Objective Findings:  see table below   PROM in degrees   Left Right   Shoulder flexion 170° 150 (from 90°)   Shoulder abduction  170° 150 (from 90°)   Shoulder internal rotation (IR)  (measured at 45° abduction) 80° 50 (From 30°)   Shoulder external rotation (ER)  (measured 0° abduction) 30° 70 (from 30°)   ER (measured at 90° degrees of abduction) NT° 90 (from 80°)   Apley's scratch test (functional IR AROM)  NT PSIS      TREATMENT:   THERAPEUTIC EXERCISE: (60 minutes):  Exercises per grid below to improve mobility, strength, balance and coordination. Required minimal visual, verbal and manual cues to promote proper body alignment, promote proper body posture, promote proper body mechanics and promote proper body breathing techniques. Progressed resistance, range, repetitions and complexity of movement as indicated.      Date:  5/28/2021 Date:  6/2/21 Date:  5-24-21   Activity/Exercise Parameters Parameters Parameters   UBE 4 minutes forward, 4 minutes backward 4 forward, 4 backward, good posture 3 forward, 3 backward   Band pulldowns Blue 2 x 10 Blue 2 x 10 Green 2 x 10   Band rows Blue 2 x 10 Blue 2 x 10 Green 2 x 10   Band ER and IR Yellow 2 x 10 Yellow 2 x 10 Yellow 2 x 10   Band biceps 3 lbs 3 x 10 Yellow 2 x 10 Red 2 x 10   Band triceps pulldown Green 3 x 10 green 2 x 10 Green 2 x 10i   Band horizontal abduction Red 2 x 10 Red 2 x 10 Red 2 x 10   IR strap 5 x 30 sec 5 x 30 sec 5 x 30 sec   Supine punch 3 lbs 2 x 10 3 lbs 2 x 10 2 lbs 2 x 10   Supine flexion (elbow straight) 1 lbs 2 x 10 1 lbs 2 x 10 1 lbs 2 x 10   Supine stick ER 10 sec x 10 10 sec x 10 10 sec x 10   Standing stick flexion Standing 5 sec 2 x 10 pushing arm up 10 sec x 10 10 sec x 20   Seated and standing ball roll Blue ball seated chair 10 sec x 10 Blue ball seated in chair x 10 reps BUE's and standing at wall x 10 Green ball 10 sec x 10   Pulley  10 sec x 10 10 sec x 10 flexion and abduction 10 sec x 20   Finger ladder 10 sec x 10 10 sec x 10 10 sec x 10   TRX flexion --- 10 sec x 10    PROM  --- --- ---   Prone Y and T 2 x 10 unilateral  2 x 10     Time spent with patient reviewing proper muscle recruitment and technique with exercises. MANUAL THERAPY:(0 minutes): Joint mobilization and Soft tissue mobilization was utilized and necessary because of the patient's restricted joint motion and restricted motion of soft tissue  Pt. Received PROM to right shoulder in all planes as per MD guidelines. Gentle distraction and oscillation. (no charge)    MODALITIES: (0 minutes):      seated vasopneumatic with pillow under arm to right shoulder for swelling and pain (no charge)  HEP: As above; handouts given to patient for all exercises. Treatment/Session Summary:    · Response to Treatment:  Patient advised to be doing exercises at home due to fatigue. WIll plan to see patient 1 more session and then plan to discharge. · Communication/Consultation:  None today  · Equipment provided today:  None today  · Recommendations/Intent for next treatment session: Next visit will focus on following guideline above, modalities, exercises, manual therapy as needed.     Total Treatment Billable Duration:  60 mins   PT Patient Time In/Time Out  Time In: 0900  Time Out: [address], PT

## 2021-06-09 ENCOUNTER — HOSPITAL ENCOUNTER (OUTPATIENT)
Dept: PHYSICAL THERAPY | Age: 71
Discharge: HOME | End: 2021-06-09
Payer: MEDICARE

## 2021-06-09 PROCEDURE — 97110 THERAPEUTIC EXERCISES: CPT

## 2021-06-09 NOTE — PROGRESS NOTES
Liane Krishnamurthy  : 1950  [place]: [place]  [place] [facility]  [address] [place], [address], [facility], [address] [place] [address] Street  [phone]   [phone]      OUTPATIENT PHYSICAL THERAPY: Daily Treatment Note 2021    ICD-10: Treatment Diagnosis: right shoulder joint replacement surgery (Z96.611)                Treatment Diagnosis 2: right shoulder pain (M25.511)                Treatment Diagnosis 3: edema (R60.9)  Precautions: osteopenia, history of active prostate cancer, remission for thyroid cancer, and bladder cancer, FALLS RISK  Allergies: Patient has no known allergies. TREATMENT PLAN:  Effective Dates: 2021 TO 2021  Frequency/Duration: 2 times a week for 12 weeks MEDICAL/REFERRING DIAGNOSIS:  Aftercare following right shoulder joint replacement surgery [Z47.1, Z96.611]   DATE OF ONSET: s/p reverse right shoulder arthroplasty with a Delta Xtend prosthesis, latissimus dorsi and teres major tendon transfer 3/25/2021  REFERRING PHYSICIAN: Rehan Willams MD MD Orders: Evaluate and Treat   Return MD Appointment: unknown - might be first week in May 2021  Physician Guideline:  Week 0-6: Sling at all times except for therapy for 3 weeks, sling as needed for an additional 3 weeks, pendulums to warm up, PROM progressing to AAROM, No IR, no biceps contraction        Weeks 1-3:supine ER 0-30, elevation to 90 degrees, isometrics abd, ER, IR        Weeks 4-6: supine ER and elevation gradual increase to full, continue above        Week 7-9: continue ROM with addition of AROM, start IR, continue above        Week 10: start strengthening     Pre-treatment Symptoms/Complaints:  Patient reported no complaints and improvements of shoulder pain.     Pain: Initial: Pain Intensity 1: 2  Pain Location 1: Shoulder  Pain Orientation 1: Right  Post Session:  0/10   Medications Last Reviewed:  2021  Updated Objective Findings:  see table below   PROM in degrees   Left Right   Shoulder flexion 170° 160 (from 90°)   Shoulder abduction  170° 160 (from 90°)   Shoulder internal rotation (IR)  (measured at 45° abduction) 80° 50 (From 30°)   Shoulder external rotation (ER)  (measured 0° abduction) 30° 70 (from 30°)   ER (measured at 90° degrees of abduction) NT° 90 (from 80°)   Apley's scratch test (functional IR AROM)  NT PSIS      TREATMENT:   THERAPEUTIC EXERCISE: (55 minutes):  Exercises per grid below to improve mobility, strength, balance and coordination. Required minimal visual, verbal and manual cues to promote proper body alignment, promote proper body posture, promote proper body mechanics and promote proper body breathing techniques. Progressed resistance, range, repetitions and complexity of movement as indicated.      Date:  5/28/2021 Date:  6/2/21 Date:  6-9-21   Activity/Exercise Parameters Parameters Parameters   UBE 4 minutes forward, 4 minutes backward 4 forward, 4 backward, good posture 4 forward, 4 backward   Band pulldowns Blue 2 x 10 Blue 2 x 10 Blue 3 x 10   Band rows Blue 2 x 10 Blue 2 x 10 Blue 3 x 10   Band ER and IR Yellow 2 x 10 Yellow 2 x 10 Yellow 2 x 10   Band biceps 3 lbs 3 x 10 Yellow 2 x 10 4 lbs  2 x 10   Band triceps pulldown Green 3 x 10 green 2 x 10 Blue 2 x 10i   Band horizontal abduction Red 2 x 10 Red 2 x 10 red 2 x 10   IR strap 5 x 30 sec 5 x 30 sec 5 x 30 sec   Supine punch 3 lbs 2 x 10 3 lbs 2 x 10 3 lbs 2 x 10   Supine flexion (elbow straight) 1 lbs 2 x 10 1 lbs 2 x 10 2 lbs 2 x 10   Supine stick ER 10 sec x 10 10 sec x 10 ---   Standing stick flexion Standing 5 sec 2 x 10 pushing arm up 10 sec x 10 10 sec x 20   Seated and standing ball roll Blue ball seated chair 10 sec x 10 Blue ball seated in chair x 10 reps BUE's and standing at wall x 10 Green ball 10 sec x 10   Pulley  10 sec x 10 10 sec x 10 flexion and abduction 10 sec x 20   Finger ladder 10 sec x 10 10 sec x 10 10 sec x 10 TRX flexion --- 10 sec x 10 10 sec x 10   PROM  --- --- ---   Prone Y and T 2 x 10 unilateral  2 x 10 Y unilateral, 3 x 10 T unilateral     Time spent with patient reviewing proper muscle recruitment and technique with exercises. MANUAL THERAPY:(0 minutes): Joint mobilization and Soft tissue mobilization was utilized and necessary because of the patient's restricted joint motion and restricted motion of soft tissue  Pt. Received PROM to right shoulder in all planes as per MD guidelines. Gentle distraction and oscillation. (no charge)    MODALITIES: (0 minutes):      seated vasopneumatic with pillow under arm to right shoulder for swelling and pain (no charge)  HEP: As above; handouts given to patient for all exercises. Treatment/Session Summary:    · Response to Treatment:  Patient tolerated session well and reported no complaints. He has met most preset goals and is discharged at this time. · Communication/Consultation:  None today  · Equipment provided today:  None today  · Recommendations/Intent for next treatment session: Patient is discharged at this time.     Total Treatment Billable Duration:  55 mins   PT Patient Time In/Time Out  Time In: 6248  Time Out: [address], PT

## 2021-06-09 NOTE — THERAPY DISCHARGE
Malcolmarmechelle Carri : 1950 [place] [employer] [place] Secondary:  [address] [facility], [address], [place], [address] [place] [address] [phone]   [phone] OUTPATIENT PHYSICAL THERAPY:Discharge 2021 ICD-10: Treatment Diagnosis: right shoulder joint replacement surgery (A75.391) Treatment Diagnosis 2: right shoulder pain (M25.511) Treatment Diagnosis 3: edema (R60.9) Precautions: osteopenia, history of active prostate cancer, remission for thyroid cancer, and bladder cancer, FALLS RISK Allergies: Patient has no known allergies. TREATMENT PLAN: 
Effective Dates: 2021 TO 2021 Frequency/Duration: 2 times a week for 12 weeks MEDICAL/REFERRING DIAGNOSIS: 
Aftercare following joint replacement surgery [Z47.1] Presence of right artificial shoulder joint [Z96.611] DATE OF ONSET: s/p reverse right shoulder arthroplasty with a Delta Xtend prosthesis, latissimus dorsi and teres major tendon transfer 3/25/2021 REFERRING PHYSICIAN: Mt Mulligan MD MD Orders: Evaluate and Treat Return MD Appointment: unknown - might be first week in May 2021 Physician Guideline: 
Week 0-6: Sling at all times except for therapy for 3 weeks, sling as needed for an additional 3 weeks, pendulums to warm up, PROM progressing to AAROM, No IR, no biceps contraction Weeks 1-3:supine ER 0-30, elevation to 90 degrees, isometrics abd, ER, IR Weeks 4-6: supine ER and elevation gradual increase to full, continue above Week 7-9: continue ROM with addition of AROM, start IR, continue above Week 10: start strengthening INITIAL ASSESSMENT:  Mr. Omer Middleton is a 69 y.o. male presenting to physical therapy s/p reverse right shoulder arthroplasty with a Delta Xtend prosthesis, latissimus dorsi and teres major tendon transfer 3/25/2021.   Patient reported no complications with surgery and was inpatient for 1 day. He underwent home therapy for 6 sessions without complaints. Patient is eager to car racing, working on cars, remodeling his house, and yard work. RECERTIFICATION (8/1/6926):  Patient has been seen for 13 visits of therapy 4/9/2021 to 6/9/2021 s/p reverse right shoulder arthroplasty with a Delta Xtend prosthesis, latissimus dorsi and teres major tendon transfer 3/25/2021. Patient reports feeling at least 80% better overall with less pain in the shoulder. Patient reports some weakness but ROM and strength has significantly improved. He is independent with HEP and is discharged at this time. PROBLEM LIST (Impacting functional limitations): 1. Decreased Strength 2. Decreased ADL/Functional Activities 3. Decreased Transfer Abilities 4. Increased Pain 5. Increased Fatigue 6. Increased Shortness of Breath 7. Decreased Flexibility/Joint Mobility 8. Edema/Girth INTERVENTIONS PLANNED: (Treatment may consist of any combination of the following) 1. Cold 2. Cryotherapy 3. Decongestion Therapy 4. Electrical Stimulation 5. Heat 6. Home Exercise Program (HEP) 7. Manual Therapy 8. Neuromuscular Re-education/Strengthening 9. Range of Motion (ROM) 10. Therapeutic Exercise/Strengthening 11. Transcutaneous Electrical Nerve Stimulation (TENS) 12. Transfer Training GOALS: (Goals have been discussed and agreed upon with patient.) Short-Term Functional Goals: Time Frame: 4/9/2021 to 5/20/2021 1. Patient demonstrates independence with home exercise program without verbal cueing provided by therapist. - GOAL MET 2. Improve seated posture with decreased forward head, forward shoulders, and thoracic kyphosis with self cuing and use of lumbar roll. - GOAL MET 3. Improve PROM ER (at 0 degrees abduction) to at least 50 degrees, elevation to at least 140. - GOAL MET Discharge Goals: Time Frame: 4/9/2021 to 7/1/2021 1.  Improve pain to 3/10 at the most with work building and racing race cars, household chores, ADLs, house remodel, and yard work. - GOAL MET 2. Improve PROM ER (at 0 degrees abduction) to at least 60 degrees, elevation to at least 150, IR to 70. - GOAL MET 3. Improve Apley's IR to belt line in order to don/doff belt and put wallet into back pocket. - ONGOING 4. Improve strength to at least 4/5 in order to improve tolerance to lifting/carrying, pushing/pulling, work with racing and building cars. - ALMOST MET 5. Improve tenderness to palpation, tightness, and swelling of incision and proximal arm in order to improve sensitivity and pain. - GOAL MET 6. Improve AROM elevation to at least 90 degrees with minimal scapular hike. - GOAL MET 7. Improve Disabilities of the Arm, Shoulder, and Hand Index score to 30/55. - GOAL MET Outcome Measure: Tool Used: Disabilities of the Arm, Shoulder and Hand (DASH) Questionnaire - Quick Version Score:  Initial: 43/55  Most Recent: 33/55 (Date: 5/10/2021) 
                     19/55 (Date: 6/9/2021) Interpretation of Score: The DASH is designed to measure the activities of daily living in person's with upper extremity dysfunction or pain. Each section is scored on a 1-5 scale, 5 representing the greatest disability. The scores of each section are added together for a total score of 55. OBJECTIVE MEASUREMENTS: 
 
Observation/Postural and Gait Assessment:  Patient sits with mild to moderate (From significant) forward head, forward shoulders, and thoracic kyphosis that are reversible with cuing. Patient has a large anterior proximal humerus incision that is completely approximated with scar tissue (from scar tissue, scabbing, and surgical glue). No signs or symptoms of infection or deep vein thrombosis are noted at this time.  
 
Palpation:  Decrease gross tenderness to palpation of right shoulder with palpable swelling of the proximal humerus and anterior chest.  Flexibility minimally (From moderately) limited of pectoralis minor and major. ROM: NT: not tested; AROM flexion to at least 100 degrees with minimal scapular hike PROM in degrees Left Right Shoulder flexion 170° 160 (from 90°) Shoulder abduction  170° 160 (from 90°) Shoulder internal rotation (IR) 
(measured at 45° abduction) 80° 50 (From 30°) Shoulder external rotation (ER) 
(measured 0° abduction) 30° 70 (from 30°)  
ER (measured at 90° degrees of abduction) NT° 90 (from 80°) Apley's scratch test (functional IR AROM)  NT PSIS Passive Accessory Motion: Grossly hypomobile for the right shoulder joint but improved since start of therapy. Strength: scapular retractors - 4/5 Manual Muscle Test (out of 5) Left Right Shoulder scaption 3 (from At least 3) NT Shoulder ER 3 (from At least 3) NT Shoulder IR 3 (from At least 3) NT Teres Minor NT NT Infraspinatus NT NT  
 
Functional Mobility:  Patient is safe and independent with left upper extremity tasks but requires assistance with bilateral upper extremity tasks. Ambulatory/Rehab Services H2 Model Falls Risk Assessment Risk Factors: 
     (1)  Gender [Male] 
     (5)  History of Recent Falls [w/in 3 months] Ability to Rise from Chair: 
     (1)  Pushes up, successful in one attempt Falls Prevention Plan: 
     Physical Limitations to Exercise (specify):  supervise patient throughout sessions Total: (5 or greater = High Risk): 7  
©2010 [facility] [address] [place] [address] Patent #[id number]. Federal Law prohibits the replication, distribution or use without written permission from [facility] [employer] Medical Necessity:  
Patient has been seen for 13 visits of therapy 4/9/2021 to 6/9/2021 s/p reverse right shoulder arthroplasty with a Delta Xtend prosthesis, latissimus dorsi and teres major tendon transfer 3/25/2021. Patient reports feeling at least 80% better overall with less pain in the shoulder.   Patient reports some weakness but ROM and strength has significantly improved. He is independent with HEP and is discharged at this time. Rehabilitation Potential For Stated Goals: Good Regarding Dionna Perez Best's therapy, I certify that the treatment plan above will be carried out by a therapist or under their direction. Thank you for this referral, Leigha Mcclelland, PT

## 2021-06-18 ENCOUNTER — HOSPITAL ENCOUNTER (EMERGENCY)
Age: 71
Discharge: HOME | End: 2021-06-18
Attending: EMERGENCY MEDICINE
Payer: MEDICARE

## 2021-06-18 ENCOUNTER — APPOINTMENT (OUTPATIENT)
Dept: GENERAL RADIOLOGY | Age: 71
End: 2021-06-18
Attending: EMERGENCY MEDICINE
Payer: MEDICARE

## 2021-06-18 VITALS
HEART RATE: 72 BPM | OXYGEN SATURATION: 98 % | SYSTOLIC BLOOD PRESSURE: 148 MMHG | TEMPERATURE: 98 F | RESPIRATION RATE: 18 BRPM | DIASTOLIC BLOOD PRESSURE: 86 MMHG

## 2021-06-18 DIAGNOSIS — S52.572A OTHER CLOSED INTRA-ARTICULAR FRACTURE OF DISTAL END OF LEFT RADIUS, INITIAL ENCOUNTER: ICD-10-CM
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
## 2021-06-18 PROCEDURE — 99283 EMERGENCY DEPT VISIT LOW MDM: CPT

## 2021-06-18 PROCEDURE — 74011250637 HC RX REV CODE- 250/637: Performed by: PHYSICIAN ASSISTANT

## 2021-06-18 PROCEDURE — 75810000053 HC SPLINT APPLICATION

## 2021-06-18 PROCEDURE — 73110 X-RAY EXAM OF WRIST: CPT

## 2021-06-18 RX ORDER — OXYCODONE AND ACETAMINOPHEN 7.5; 325 MG/1; MG/1
1 TABLET ORAL
Status: COMPLETED | OUTPATIENT
Start: 2021-06-18 | End: 2021-06-18

## 2021-06-18 RX ORDER — ONDANSETRON 4 MG/1
4 TABLET, ORALLY DISINTEGRATING ORAL
Status: COMPLETED | OUTPATIENT
Start: 2021-06-18 | End: 2021-06-18

## 2021-06-18 RX ORDER — OXYCODONE AND ACETAMINOPHEN 5; 325 MG/1; MG/1
1 TABLET ORAL
Qty: 12 TABLET | Refills: 0 | Status: SHIPPED | OUTPATIENT
Start: 2021-06-18 | End: 2021-06-22 | Stop reason: CLARIF

## 2021-06-18 RX ADMIN — OXYCODONE HYDROCHLORIDE AND ACETAMINOPHEN 1 TABLET: 7.5; 325 TABLET ORAL at 13:17

## 2021-06-18 RX ADMIN — ONDANSETRON 4 MG: 4 TABLET, ORALLY DISINTEGRATING ORAL at 13:17

## 2021-06-18 NOTE — DISCHARGE INSTRUCTIONS
Do not remove splint. Do not get wet. Rest, ice, elevate and avoid painful activities. Use the pain medicine as directed. Call for an appointment with the orthopedic for follow-up. Return to the ED if worsening.

## 2021-06-18 NOTE — ED NOTES
I have reviewed discharge instructions with the patient. The patient verbalized understanding. Patient left ED via Discharge Method: ambulatory to Home with his wife. Opportunity for questions and clarification provided. Patient given 1 scripts. To continue your aftercare when you leave the hospital, you may receive an automated call from our care team to check in on how you are doing. This is a free service and part of our promise to provide the best care and service to meet your aftercare needs.  If you have questions, or wish to unsubscribe from this service please call [phone]. Thank you for Choosing our [facility] Emergency Department.

## 2021-06-18 NOTE — ED TRIAGE NOTES
Pt ambulatory to triage. Pt states he fell trying to get away from bees and has left wrist pain. Pt states tried to catch himself.

## 2021-06-18 NOTE — ED PROVIDER NOTES
Patient is here with left wrist pain. He states he was running from some bees about an hour ago and tripped and fell subsequently landing on an outstretched arm. He has pain and swelling to the left wrist.  He is right-handed but recently had a right shoulder replacement. There are no open wounds. He has no other complaints. He did not hit his head. He ambulated to the room without difficulty and is well-hydrated. The history is provided by the patient. Wrist Pain   This is a new problem. The current episode started less than 1 hour ago. The problem occurs constantly. The problem has not changed since onset. The pain is present in the left wrist. The quality of the pain is described as aching. The pain is at a severity of 8/10. The pain is moderate. Associated symptoms include limited range of motion and stiffness. Pertinent negatives include no numbness, no tingling, no itching, no back pain and no neck pain. The symptoms are aggravated by movement and activity. He has tried nothing for the symptoms. There has been a history of trauma.         Past Medical History:   Diagnosis Date    Arthritis     Arthritis, degenerative 9/28/2016    Chronic gastritis 9/28/2016    COPD (chronic obstructive pulmonary disease) ([facility] [address] 9/28/2016    HTN (hypertension) 9/28/2016    Hypercholesterolemia     Hypothyroidism 9/28/2016    Long-term use of high-risk medication 8/26/2019    Osteopenia 9/28/2016    Other ill-defined conditions(799.89)     chronic back pain    Prostate cancer ([facility] [address] 9/28/2016    stable - no recurrence - recent PSA normal       Past Surgical History:   Procedure Laterality Date    HX HERNIA REPAIR      HX ORTHOPAEDIC Right 03/25/2021    shoulder    HX PARTIAL THYROIDECTOMY Left     HX PROSTATECTOMY           Family History:   Problem Relation Age of Onset    Cancer Brother        Social History     Socioeconomic History    Marital status:      Spouse name: Not on file    Number of children: Not on file    Years of education: Not on file    Highest education level: Not on file   Occupational History    Not on file   Tobacco Use    Smoking status: Current Every Day Smoker     Packs/day: 0.50     Types: Cigarettes     Start date: 1/1/1963    Smokeless tobacco: Never Used   Vaping Use    Vaping Use: Never used   Substance and Sexual Activity    Alcohol use: Yes     Alcohol/week: 14.0 standard drinks     Types: 14 Cans of beer per week    Drug use: Not Currently     Types: Marijuana     Comment: weekly    Sexual activity: Not Currently   Other Topics Concern    Not on file   Social History Narrative    Not on file     Social Determinants of Health     Financial Resource Strain:     Difficulty of Paying Living Expenses:    Food Insecurity:     Worried About Running Out of Food in the Last Year:     Ran Out of Food in the Last Year:    Transportation Needs:     Lack of Transportation (Medical):  Lack of Transportation (Non-Medical):    Physical Activity:     Days of Exercise per Week:     Minutes of Exercise per Session:    Stress:     Feeling of Stress :    Social Connections:     Frequency of Communication with Friends and Family:     Frequency of Social Gatherings with Friends and Family:     Attends Sikhism Services:     Active Member of Clubs or Organizations:     Attends Club or Organization Meetings:     Marital Status:    Intimate Partner Violence:     Fear of Current or Ex-Partner:     Emotionally Abused:     Physically Abused:     Sexually Abused: ALLERGIES: Patient has no known allergies. Review of Systems   Constitutional: Negative. HENT: Negative. Eyes: Negative. Respiratory: Negative. Cardiovascular: Negative. Gastrointestinal: Negative. Genitourinary: Negative. Musculoskeletal: Positive for stiffness. Negative for back pain and neck pain. Left wrist pain   Skin: Negative. Negative for itching.    Neurological: Negative. Negative for tingling and numbness. Psychiatric/Behavioral: Negative. All other systems reviewed and are negative. Vitals:    06/18/21 1256   BP: (!) 152/94   Pulse: 65   Resp: 16   Temp: 97.8 °F (36.6 °C)   SpO2: 97%            Physical Exam  Vitals and nursing note reviewed. Constitutional:       Appearance: He is well-developed. HENT:      Head: Normocephalic and atraumatic. Right Ear: External ear normal.      Left Ear: External ear normal.      Nose: Nose normal.   Eyes:      Conjunctiva/sclera: Conjunctivae normal.      Pupils: Pupils are equal, round, and reactive to light. Cardiovascular:      Rate and Rhythm: Normal rate and regular rhythm. Heart sounds: Normal heart sounds. Pulmonary:      Effort: Pulmonary effort is normal.      Breath sounds: Normal breath sounds. Abdominal:      General: Bowel sounds are normal.      Palpations: Abdomen is soft. Musculoskeletal:      Left wrist: Swelling and tenderness present. Decreased range of motion. Arms:       Cervical back: Normal range of motion and neck supple. Skin:     General: Skin is warm and dry. Capillary Refill: Capillary refill takes less than 2 seconds. Neurological:      General: No focal deficit present. Mental Status: He is alert and oriented to person, place, and time. Deep Tendon Reflexes: Reflexes are normal and symmetric. Psychiatric:         Mood and Affect: Mood normal.         Behavior: Behavior normal.         Thought Content:  Thought content normal.         Judgment: Judgment normal.          MDM  Number of Diagnoses or Management Options     Amount and/or Complexity of Data Reviewed  Tests in the radiology section of CPT®: reviewed    Risk of Complications, Morbidity, and/or Mortality  Presenting problems: moderate  Diagnostic procedures: moderate  Management options: moderate           Procedures  2:33 PM perfect served DUY Hernandez with orthopedics regarding patient. We discussed the history, physical exam, work-up and disposition. I will place patient in a sugar tong splint, use a sling and have him follow-up. He should rest, ice, elevate and avoid painful activities. Patient's wife is driving him home. He is stable for discharge and ambulatory out of the ER without difficulty at this time. The patient was observed in the ED. Results Reviewed:  XR WRIST LT AP/LAT/OBL MIN 3V   Final Result      Intra-articular distal radius fracture as above. I discussed the results of all labs, procedures, radiographs, and treatments with the patient and available family. Treatment plan is agreed upon and the patient is ready for discharge. All voiced understanding of the discharge plan and medication instructions or changes as appropriate. Questions about treatment in the ED were answered. All were encouraged to return should symptoms worsen or new problems develop.

## 2021-06-22 PROBLEM — S52.572A OTHER INTRAARTICULAR FRACTURE OF LOWER END OF LEFT RADIUS, INITIAL ENCOUNTER FOR CLOSED FRACTURE: Status: ACTIVE | Noted: 2021-06-22

## 2021-06-22 NOTE — H&P (VIEW-ONLY)
Orthopaedic Hand Clinic Note    Name: Hernán Leal  YOB: 1950  Gender: male  MRN: [id number]      CC: Patient referred for evaluation of hand pain    HPI: Hernán Leal is a 69 y.o. male right handed with a chief complaint of  left wrist pain. The injury occurred 4 days ago when the patient was chased by bees and fell unto the left wrist. The pain is located diffusely about the wrist. Denies numbness or paresthesias of the fingers. Evaluation has included x-rays. Treatment to date has included splint. Denies loss of consciousness, head injury or neck pain. ROS/Meds/PSH/PMH/FH/SH: I personally reviewed the patients standard intake form. Pertinents are discussed in the HPI    Physical Examination:  General: Awake and alert. HEENT: Normocephalic, atraumatic  CV/Pulm: Breathing even and unlabored  Skin: No obvious rashes noted. Lymphatic: No obvious evidence of lymphedema or lymphadenopathy    Musculoskeletal Exam:  Examination of the Left upper extremity demonstrates no open wounds. Negative Tinel's over left carpal tunnel. Sensation is intact throughout, cap refill in all fingers < 5 seconds. Finger motion is limited with mild swelling of the hand and fingers. Tenderness over the distal radius. Positive tenderness over the ulnar aspect of the wrist. No tenderness at elbow, shoulder, clavicle or cervical spine. Imaging / Electrodiagnostic Tests:     XR of the left wrist were independently interpreted and reviewed with the patient which demonstrates a distal radius fracture with 10 degrees of dorsal tilt, some dorsal comminution is noted, there is also an articular split on the lunate facet in the coronal plane    Assessment:   1. Other intraarticular fracture of lower end of left radius, initial encounter for closed fracture        Plan:   We discussed the diagnosis and different treatment options.  We discussed observation, casting, further imaging, and surgical fixation and the risks, benefits and alternatives of all these options. After discussing in detail the patient elects to proceed with left distal radius open reduction internal fixation, we discussed all treatment options in detail including cast treatment versus surgery, the patient would like to have surgery to expedite his recovery and improve his long-term outcome. Patient voiced accordance and understanding of the information provided and the formulated plan. All questions were answered to the patient's satisfaction during the encounter.     5 This is an illness/condition that threatens bodily function  Treatment at this time: Elective major surgery with procedural risk factors   I independently interpreted x-ray images from the emergency department    Betsy Ramos MD  Orthopaedic Surgery  06/22/21  8:39 AM

## 2021-06-23 ENCOUNTER — ANESTHESIA EVENT (OUTPATIENT)
Dept: SURGERY | Age: 71
End: 2021-06-23
Payer: MEDICARE

## 2021-06-24 ENCOUNTER — APPOINTMENT (OUTPATIENT)
Dept: GENERAL RADIOLOGY | Age: 71
End: 2021-06-24
Attending: ORTHOPAEDIC SURGERY
Payer: MEDICARE

## 2021-06-24 ENCOUNTER — ANESTHESIA (OUTPATIENT)
Dept: SURGERY | Age: 71
End: 2021-06-24
Payer: MEDICARE

## 2021-06-24 ENCOUNTER — HOSPITAL ENCOUNTER (OUTPATIENT)
Age: 71
Setting detail: OUTPATIENT SURGERY
Discharge: HOME | End: 2021-06-24
Attending: ORTHOPAEDIC SURGERY | Admitting: ORTHOPAEDIC SURGERY
Payer: MEDICARE

## 2021-06-24 VITALS
OXYGEN SATURATION: 95 % | DIASTOLIC BLOOD PRESSURE: 88 MMHG | SYSTOLIC BLOOD PRESSURE: 155 MMHG | TEMPERATURE: 98.7 F | RESPIRATION RATE: 15 BRPM | WEIGHT: 143 LBS | HEIGHT: 67 IN | BODY MASS INDEX: 22.44 KG/M2 | HEART RATE: 65 BPM

## 2021-06-24 PROCEDURE — A4565 SLINGS: HCPCS | Performed by: ORTHOPAEDIC SURGERY

## 2021-06-24 PROCEDURE — 76010000160 HC OR TIME 0.5 TO 1 HR INTENSV-TIER 1: Performed by: ORTHOPAEDIC SURGERY

## 2021-06-24 PROCEDURE — 77030021122 HC SPLNT MAT FST BSNM -A: Performed by: ORTHOPAEDIC SURGERY

## 2021-06-24 PROCEDURE — 74011250636 HC RX REV CODE- 250/636: Performed by: NURSE ANESTHETIST, CERTIFIED REGISTERED

## 2021-06-24 PROCEDURE — 74011250637 HC RX REV CODE- 250/637: Performed by: ANESTHESIOLOGY

## 2021-06-24 PROCEDURE — 76210000063 HC OR PH I REC FIRST 0.5 HR: Performed by: ORTHOPAEDIC SURGERY

## 2021-06-24 PROCEDURE — C1713 ANCHOR/SCREW BN/BN,TIS/BN: HCPCS | Performed by: ORTHOPAEDIC SURGERY

## 2021-06-24 PROCEDURE — 25608 OPTX DST RD XART FX/EPI SEP2: CPT | Performed by: ORTHOPAEDIC SURGERY

## 2021-06-24 PROCEDURE — 76060000032 HC ANESTHESIA 0.5 TO 1 HR: Performed by: ORTHOPAEDIC SURGERY

## 2021-06-24 PROCEDURE — 77030003602 HC NDL NRV BLK BBMI -B: Performed by: ANESTHESIOLOGY

## 2021-06-24 PROCEDURE — 77030033138 HC SUT PGA STRATFX J&J -B: Performed by: ORTHOPAEDIC SURGERY

## 2021-06-24 PROCEDURE — 74011250636 HC RX REV CODE- 250/636: Performed by: NURSE PRACTITIONER

## 2021-06-24 PROCEDURE — 76010010054 HC POST OP PAIN BLOCK: Performed by: ORTHOPAEDIC SURGERY

## 2021-06-24 PROCEDURE — 77030031139 HC SUT VCRL2 J&J -A: Performed by: ORTHOPAEDIC SURGERY

## 2021-06-24 PROCEDURE — 74011250636 HC RX REV CODE- 250/636: Performed by: ANESTHESIOLOGY

## 2021-06-24 PROCEDURE — 77030000031 HC BIT DRL QC SYNT -C: Performed by: ORTHOPAEDIC SURGERY

## 2021-06-24 PROCEDURE — 77030000032 HC CUF TRNQT ZIMM -B: Performed by: ORTHOPAEDIC SURGERY

## 2021-06-24 PROCEDURE — 77030040922 HC BLNKT HYPOTHRM STRY -A: Performed by: ANESTHESIOLOGY

## 2021-06-24 PROCEDURE — 76942 ECHO GUIDE FOR BIOPSY: CPT | Performed by: ORTHOPAEDIC SURGERY

## 2021-06-24 PROCEDURE — 76210000021 HC REC RM PH II 0.5 TO 1 HR: Performed by: ORTHOPAEDIC SURGERY

## 2021-06-24 PROCEDURE — 2709999900 HC NON-CHARGEABLE SUPPLY: Performed by: ORTHOPAEDIC SURGERY

## 2021-06-24 DEVICE — SCREW BNE L16MM DIA2.4MM CORT S STL ST T8 STARDRV RECESS: Type: IMPLANTABLE DEVICE | Site: WRIST | Status: FUNCTIONAL

## 2021-06-24 DEVICE — 2.4MM VA LOCKING SCREW STARDRIVE 20MM: Type: IMPLANTABLE DEVICE | Site: WRIST | Status: FUNCTIONAL

## 2021-06-24 DEVICE — SCREW BNE L14MM DIA2.4MM CORT S STL ST T8 STARDRV RECESS: Type: IMPLANTABLE DEVICE | Site: WRIST | Status: FUNCTIONAL

## 2021-06-24 DEVICE — PLATE BNE L51MM 6X3 H L VOLAR DST RAD S STL VAR ANG LOK: Type: IMPLANTABLE DEVICE | Site: WRIST | Status: FUNCTIONAL

## 2021-06-24 DEVICE — 2.4MM VA LOCKING SCREW STARDRIVE 16MM: Type: IMPLANTABLE DEVICE | Site: WRIST | Status: FUNCTIONAL

## 2021-06-24 DEVICE — 2.4MM VA LOCKING SCREW STARDRIVE 18MM: Type: IMPLANTABLE DEVICE | Site: WRIST | Status: FUNCTIONAL

## 2021-06-24 RX ORDER — SODIUM CHLORIDE 0.9 % (FLUSH) 0.9 %
5-40 SYRINGE (ML) INJECTION EVERY 8 HOURS
Status: DISCONTINUED | OUTPATIENT
Start: 2021-06-24 | End: 2021-06-24 | Stop reason: HOSPADM

## 2021-06-24 RX ORDER — ACETAMINOPHEN 500 MG
1000 TABLET ORAL ONCE
Status: COMPLETED | OUTPATIENT
Start: 2021-06-24 | End: 2021-06-24

## 2021-06-24 RX ORDER — DEXAMETHASONE SODIUM PHOSPHATE 4 MG/ML
INJECTION, SOLUTION INTRA-ARTICULAR; INTRALESIONAL; INTRAMUSCULAR; INTRAVENOUS; SOFT TISSUE
Status: COMPLETED | OUTPATIENT
Start: 2021-06-24 | End: 2021-06-24

## 2021-06-24 RX ORDER — MIDAZOLAM HYDROCHLORIDE 1 MG/ML
2 INJECTION, SOLUTION INTRAMUSCULAR; INTRAVENOUS
Status: COMPLETED | OUTPATIENT
Start: 2021-06-24 | End: 2021-06-24

## 2021-06-24 RX ORDER — PROPOFOL 10 MG/ML
INJECTION, EMULSION INTRAVENOUS AS NEEDED
Status: DISCONTINUED | OUTPATIENT
Start: 2021-06-24 | End: 2021-06-24 | Stop reason: HOSPADM

## 2021-06-24 RX ORDER — MIDAZOLAM HYDROCHLORIDE 1 MG/ML
2 INJECTION, SOLUTION INTRAMUSCULAR; INTRAVENOUS ONCE
Status: DISCONTINUED | OUTPATIENT
Start: 2021-06-24 | End: 2021-06-24 | Stop reason: HOSPADM

## 2021-06-24 RX ORDER — LIDOCAINE HYDROCHLORIDE 10 MG/ML
0.1 INJECTION INFILTRATION; PERINEURAL AS NEEDED
Status: DISCONTINUED | OUTPATIENT
Start: 2021-06-24 | End: 2021-06-24 | Stop reason: HOSPADM

## 2021-06-24 RX ORDER — SODIUM CHLORIDE 0.9 % (FLUSH) 0.9 %
5-40 SYRINGE (ML) INJECTION AS NEEDED
Status: DISCONTINUED | OUTPATIENT
Start: 2021-06-24 | End: 2021-06-24 | Stop reason: HOSPADM

## 2021-06-24 RX ORDER — SODIUM CHLORIDE, SODIUM LACTATE, POTASSIUM CHLORIDE, CALCIUM CHLORIDE 600; 310; 30; 20 MG/100ML; MG/100ML; MG/100ML; MG/100ML
75 INJECTION, SOLUTION INTRAVENOUS CONTINUOUS
Status: DISCONTINUED | OUTPATIENT
Start: 2021-06-24 | End: 2021-06-24 | Stop reason: HOSPADM

## 2021-06-24 RX ORDER — FENTANYL CITRATE 50 UG/ML
100 INJECTION, SOLUTION INTRAMUSCULAR; INTRAVENOUS ONCE
Status: COMPLETED | OUTPATIENT
Start: 2021-06-24 | End: 2021-06-24

## 2021-06-24 RX ORDER — ALBUTEROL SULFATE 0.83 MG/ML
2.5 SOLUTION RESPIRATORY (INHALATION) AS NEEDED
Status: DISCONTINUED | OUTPATIENT
Start: 2021-06-24 | End: 2021-06-25 | Stop reason: HOSPADM

## 2021-06-24 RX ORDER — CEFAZOLIN SODIUM/WATER 2 G/20 ML
2 SYRINGE (ML) INTRAVENOUS ONCE
Status: COMPLETED | OUTPATIENT
Start: 2021-06-24 | End: 2021-06-24

## 2021-06-24 RX ORDER — PROPOFOL 10 MG/ML
INJECTION, EMULSION INTRAVENOUS
Status: DISCONTINUED | OUTPATIENT
Start: 2021-06-24 | End: 2021-06-24 | Stop reason: HOSPADM

## 2021-06-24 RX ORDER — OXYCODONE HYDROCHLORIDE 5 MG/1
5 TABLET ORAL
Status: DISCONTINUED | OUTPATIENT
Start: 2021-06-24 | End: 2021-06-25 | Stop reason: HOSPADM

## 2021-06-24 RX ORDER — SODIUM CHLORIDE, SODIUM LACTATE, POTASSIUM CHLORIDE, CALCIUM CHLORIDE 600; 310; 30; 20 MG/100ML; MG/100ML; MG/100ML; MG/100ML
50 INJECTION, SOLUTION INTRAVENOUS CONTINUOUS
Status: DISCONTINUED | OUTPATIENT
Start: 2021-06-24 | End: 2021-06-25 | Stop reason: HOSPADM

## 2021-06-24 RX ORDER — HYDROMORPHONE HYDROCHLORIDE 1 MG/ML
0.5 INJECTION, SOLUTION INTRAMUSCULAR; INTRAVENOUS; SUBCUTANEOUS
Status: DISCONTINUED | OUTPATIENT
Start: 2021-06-24 | End: 2021-06-25 | Stop reason: HOSPADM

## 2021-06-24 RX ADMIN — PROPOFOL 100 MCG/KG/MIN: 10 INJECTION, EMULSION INTRAVENOUS at 11:35

## 2021-06-24 RX ADMIN — DEXAMETHASONE SODIUM PHOSPHATE 4 MG: 4 INJECTION, SOLUTION INTRAMUSCULAR; INTRAVENOUS at 10:27

## 2021-06-24 RX ADMIN — PROPOFOL 20 MG: 10 INJECTION, EMULSION INTRAVENOUS at 11:35

## 2021-06-24 RX ADMIN — ACETAMINOPHEN 1000 MG: 500 TABLET ORAL at 10:03

## 2021-06-24 RX ADMIN — MIDAZOLAM 2 MG: 1 INJECTION INTRAMUSCULAR; INTRAVENOUS at 10:23

## 2021-06-24 RX ADMIN — ROPIVACAINE HYDROCHLORIDE 30 ML: 5 INJECTION, SOLUTION EPIDURAL; INFILTRATION; PERINEURAL at 10:27

## 2021-06-24 RX ADMIN — CEFAZOLIN 2 G: 1 INJECTION, POWDER, FOR SOLUTION INTRAVENOUS at 11:36

## 2021-06-24 RX ADMIN — PROPOFOL 30 MG: 10 INJECTION, EMULSION INTRAVENOUS at 11:40

## 2021-06-24 RX ADMIN — FENTANYL CITRATE 50 MCG: 50 INJECTION, SOLUTION INTRAMUSCULAR; INTRAVENOUS at 10:23

## 2021-06-24 RX ADMIN — SODIUM CHLORIDE, SODIUM LACTATE, POTASSIUM CHLORIDE, AND CALCIUM CHLORIDE 75 ML/HR: 600; 310; 30; 20 INJECTION, SOLUTION INTRAVENOUS at 10:14

## 2021-06-24 NOTE — DISCHARGE INSTRUCTIONS
Postoperative  Instructions:      Weightbearing or Lifting: You  are  not  allowed  to  lift  any  weight  or  bear  any  weight  on  the  surgical  extremity  until  cleared  by  your  surgeon. Dressing  instructions:    Keep  your  dressing  and/or  splint  clean  and  dry  at  all  times. It  will  be  removed  at  your  first  post-operative  appointment or therapy apppointment. Your  stitches  will  be  removed  at  this  visit. Showering  Instructions:  May  shower  But keep surgical dressing clean and dry until removed as explained above. Pain  Control:  - You  have  been  given  a  prescription  to  be  taken  as  directed  for  post-operative  pain  control. In  addition,  elevate  the  operative  extremity  above  the  heart  at  all  times  to  prevent  swelling  and  throbbing  pain. - If you develop constipation while taking narcotic pain medications (Norco, Hydrocodone, Percocet, Oxycodone, Dilaudid, Hydromorphone) take  over-the-counter  Colace,  100mg  by  mouth  twice  a  Day. - Nausea  is  a  common  side  effect  of  many  pain  medications. You  will  want  to  eat something  before  taking  your  pain  medicine  to  help  prevent  Nausea. - If  you  are  taking  a  prescription  pain  medication  that  contains  acetaminophen,  we  recommend  that  you  do  not  take  additional  over  the  counter  acetaminophen  (Tylenol®). Other  pain  relieving  options:   - Using  a  cold  pack  to  ice  the  affected  area  a  few  times  a  day  (15  to  20  minutes  at  a  time)  can  help  to  relieve  pain,  reduce  swelling  and  bruising.      - Elevation  of  the  affected  area  can  also  help  to  reduce  pain  and  swelling. Did  you  receive  a  nerve  Block? A  nerve  block  can  provide  pain  relief  for  one  hour  to  two  days  after  your  surgery.   As  long  as  the  nerve  block  is  working,  you  will  experience  little  or  no  sensation in  the  area  the  surgeon  operated  on. As  the  nerve  block  wears  off,  you  will  begin  to  experience  pain  or  discomfort. It  is  very  important  that  you  begin  taking  your  prescribed  pain  medication  before  the  nerve  block  fully  wears  off. The first sign that the nerve block is wearing off is tingling in your fingers. Treating  your  pain  at  the  first  sign  of  the  block  wearing  off  will  ensure  your  pain  is  better  controlled  and  more  tolerable  when  full-sensation  returns. Do  not  wait  until  the  pain  is  intolerable,  as  the  medicine  will  be  less  effective. It  is  better  to  treat  pain  in  advance  than  to  try  and  catch  up. General  Anesthesia or Sedation:      If  you  did  not  receive  a  nerve  block  during  your  surgery,  you  will  need  to  start  taking  your  pain  medication  shortly  after  your  surgery  and  should  continue  to  do  so  as  prescribed  by  your  surgeon. Please  call  [phone]  with any concern and ask to speak with Sarah Headley. Concerning problems include:      -  Excessive  redness  of  the  incisions      -  Drainage  for  more  than  2  Days after surgery or any foul smelling drainage  -  Fever  of  more  than  101.5  F      Please  call  [phone]  if  you  do  not  receive  or  are  unsure  of  your  first  follow-up  appointment. You  should  see  the  doctor  10-14  days  after  your  Surgery. Thank you for choosing me and [address] [place] [address] for your care. I will go above and beyond to ensure you receive the best care possible. Cara Horan MD, PhD      TYPICAL SIDE EFFECTS OF PAIN MEDICATION:     Constipation: Drink lots of fluids. Over the counter stool softener if needed.    Nausea: Take pain medication with food. Call your doctor with persistent nausea. ACTIVITY  · As tolerated and as directed by your doctor.    · Bathe or shower as directed by your doctor. DIET  · Day of surgery: Clear liquids until no nausea or vomiting; small portion, light diet [place] foods (ex: baked chicken, plain rice, grits, scrambled eggs, toast). Nothing greasy, fried or spicy today. · Advance to regular diet on second day, unless your doctor orders otherwise. · If nausea and vomiting continues, call your doctor. PAIN  · Take pain medication as directed by your doctor. · DO NOT take aspirin or blood thinners unless directed by your doctor. AFTER ANESTHESIA   · For the first 24 hours: DO NOT Drive, Drink alcoholic beverages, or Make important decisions. · Be aware of dizziness following anesthesia and while taking pain medication. PATIENT INSTRUCTIONS:    After general anesthesia or intravenous sedation, for 24 hours or while taking prescription Narcotics:  · Limit your activities  · Do not drive and operate hazardous machinery  · Do not make important personal or business decisions  · Do  not drink alcoholic beverages  · If you have not urinated within 8 hours after discharge, please contact your surgeon on call. *  Please give a list of your current medications to your Primary Care Provider. *  Please update this list whenever your medications are discontinued, doses are      changed, or new medications (including over-the-counter products) are added. *  Please carry medication information at all times in case of emergency situations. Preventing Infection at Home  We care about preventing infection and avoiding the spread of germs - not only when you are in the hospital but also when you return home. When you return home from the hospital, its important to take the following steps to help prevent infection and avoid spreading germs that could infect you and others. Ask everyone in your home to follow these guidelines, too.     Clean Your Hands  · Clean your hands whenever your hands are visibly dirty, before you eat, before or after touching your mouth, nose or eyes, and before preparing food. Clean them after contact with body fluids, using the restroom, touching animals or changing diapers. · When washing hands, wet them with warm water and work up a lather. Rub hands for at least 15 seconds, then rinse them and pat them dry with a clean towel or paper towel. · When using hand sanitizers, it should take about 15 seconds to rub your hands dry. If not, you probably didnt apply enough . Cover Your Sneeze or Cough  Germs are released into the air whenever you sneeze or cough. To prevent the spread of infection:  · Turn away from other people before coughing or sneezing. · Cover your mouth or nose with a tissue when you cough or sneeze. Put the tissue in the trash. · If you dont have a tissue, cough or sneeze into your upper sleeve, not your hands. · Always clean your hands after coughing or sneezing. Care for Wounds  Your skin is your bodys first line of defense against germs, but an open wound leaves an easy way for germs to enter your body. To prevent infection:  · Clean your hands before and after changing wound dressings, and wear gloves to change dressings if recommended by your doctor. · Take special care with IV lines or other devices inserted into the body. If you must touch them, clean your hands first.  · Follow any specific instructions from your doctor to care for your wounds. Contact your doctor if you experience any signs of infection, such as fever or increased redness at the surgical or wound site. Keep a Clean Home  · Clean or wipe commonly touched hard surfaces like door handles, sinks, tabletops, phones and TV remotes. · Use products labeled disinfectant to kill harmful bacteria and viruses. · Use a clean cloth or paper towel to clean and dry surfaces. Wiping surfaces with a dirty dishcloth, sponge or towel will only spread germs.   · Never share toothbrushes, dal[place], drinking glasses, utensils, razor blades, face cloths or bath towels to avoid spreading germs. · Be sure that the linens that you sleep on are clean. · Keep pets away from wounds and wash your hands after touching pets, their toys or bedding. We care about you and your health. Remember, preventing infections is a team effort between you, your family, friends and health care providers. These are general instructions for a healthy lifestyle:    No smoking/ No tobacco products/ Avoid exposure to second hand smoke  Surgeon General's Warning:  Quitting smoking now greatly reduces serious risk to your health. Obesity, smoking, and sedentary lifestyle greatly increases your risk for illness  A healthy diet, regular physical exercise & weight monitoring are important for maintaining a healthy lifestyle  You may be retaining fluid if you have a history of heart failure or if you experience any of the following symptoms:  Weight gain of 3 pounds or more overnight or 5 pounds in a week, increased swelling in our hands or feet or shortness of breath while lying flat in bed. Please call your doctor as soon as you notice any of these symptoms; do not wait until your next office visit. Recognize signs and symptoms of STROKE:  F-face looks uneven  A-arms unable to move or move unevenly  S-speech slurred or non-existent  T-time-call 911 as soon as signs and symptoms begin-DO NOT go       Back to bed or wait to see if you get better-TIME IS BRAIN. Learning About Coronavirus [phone])  Coronavirus [phone]): Overview  What is coronavirus (COVID-19)? The coronavirus disease (COVID-19) is caused by a virus. It is an illness that was first found in [place], [place], in December 2019. It has since spread worldwide. The virus can cause fever, cough, and trouble breathing. In severe cases, it can cause pneumonia and make it hard to breathe without help. It can cause death. Coronaviruses are a large group of viruses. They cause the common cold.  They also cause more serious illnesses like [place] East respiratory syndrome (MERS) and severe acute respiratory syndrome (SARS). COVID-19 is caused by a novel coronavirus. That means it's a new type that has not been seen in people before. This virus spreads person-to-person through droplets from coughing and sneezing. It can also spread when you are close to someone who is infected. And it can spread when you touch something that has the virus on it, such as a doorknob or a tabletop. What can you do to protect yourself from coronavirus (COVID-19)? The best way to protect yourself from getting sick is to:  · Avoid areas where there is an outbreak. · Avoid contact with people who may be infected. · Wash your hands often with soap or alcohol-based hand sanitizers. · Avoid crowds and try to stay at least 6 feet away from other people. · Wash your hands often, especially after you cough or sneeze. Use soap and water, and scrub for at least 20 seconds. If soap and water aren't available, use an alcohol-based hand . · Avoid touching your mouth, nose, and eyes. What can you do to avoid spreading the virus to others? To help avoid spreading the virus to others:  · Cover your mouth with a tissue when you cough or sneeze. Then throw the tissue in the trash. · Use a disinfectant to clean things that you touch often. · Wear a cloth face cover if you have to go to public areas. · Stay home if you are sick or have been exposed to the virus. Don't go to school, work, or public areas. And don't use public transportation, ride-shares, or taxis unless you have no choice. · If you are sick:  ? Leave your home only if you need to get medical care. But call the doctor's office first so they know you're coming. And wear a face cover. ? Wear the face cover whenever you're around other people. It can help stop the spread of the virus when you cough or sneeze. ? Clean and disinfect your home every day.  Use household  and disinfectant wipes or sprays. Take special care to clean things that you grab with your hands. These include doorknobs, remote controls, phones, and handles on your refrigerator and microwave. And don't forget countertops, tabletops, bathrooms, and computer keyboards. When to call for help  [id number] anytime you think you may need emergency care. For example, call if:  · You have severe trouble breathing. (You can't talk at all.)  · You have constant chest pain or pressure. · You are severely dizzy or lightheaded. · You are confused or can't think clearly. · Your face and lips have a blue color. · You pass out (lose consciousness) or are very hard to wake up. Call your doctor now if you develop symptoms such as:  · Shortness of breath. · Fever. · Cough. If you need to get care, call ahead to the doctor's office for instructions before you go. Make sure you wear a face cover to prevent exposing other people to the virus. Where can you get the latest information? The following health organizations are tracking and studying this virus. Their websites contain the most up-to-date information. Terrie Pruett also learn what to do if you think you may have been exposed to the virus. · U.S. Centers for Disease Control and Prevention (CDC): The CDC provides updated news about the disease and travel advice. The website also tells you how to prevent the spread of infection. www.cdc.gov  · World Health Organization [facility]): WHO offers information about the virus outbreaks. WHO also has travel advice. www.who.int  Current as of: May 8, 2020               Content Version: 12.5  © 2006-2020 [employer]. Care instructions adapted under license by [employer] (which disclaims liability or warranty for this information).  If you have questions about a medical condition or this instruction, always ask your healthcare professional. Rosi Curiel any warranty or liability for your use of this information.

## 2021-06-24 NOTE — OP NOTES
OPERATIVE NOTE    Jose Wayne   [id number]  6/24/2021    PRE-OP DIAGNOSIS:  Other intraarticular fracture of lower end of left radius, initial encounter for closed fracture [S52.572A]       POST-OP DIAGNOSIS: Other intraarticular fracture of lower end of left radius, initial encounter for closed fracture [S52.572A]    LATERALITY: Left    PROCEDURES PERFORMED:  Open treatment of Left intra-articular distal radius fracture with internal fixation of two fragments CPT 31693      SURGEON:  Stephan Bullock MD    IMPLANTS:  Implant Name Type Inv. Item Serial No.  Lot No. LRB No. Used Action   PLATE BNE [id number] 6X3 H L VOLAR DST RAD S STL PEDRITO ANG TERENCE - [id number]  PLATE BNE R40LL 6X3 H L VOLAR DST RAD S STL PEDRITO ANG TERENCE  DEPUY [employer] [place]_ [id number] Left 1 Implanted   SCR VA LCK ST STRDRV 2.4X16MM --  - [id number]  SCR VA LCK ST STRDRV 2.4X16MM --   SY[employer] [place] [id number] Left 1 Implanted   SCR VA LCK ST STRDRV 2.4X18MM --  - [id number]  SCR VA LCK ST STRDRV 2.4X18MM --   [employer] [place] [id number] Left 2 Implanted   SCR VA LCK ST STRDRV 2.4X20MM --  - [id number]  SCR VA LCK ST STRDRV 2.4X20MM --   SY[employer] [place] [id number] Left 2 Implanted   SCR BNE CRTX ST T8 2.4X14MM SS --  - [id number]  SCR BNE CRTX ST T8 2.4X14MM SS --   SY[employer] [place] [id number] Left 1 Implanted   SCR BNE CRTX ST T8 2.4X16MM SS --  - [id number]  SCR BNE CRTX ST T8 2.4X16MM SS --   SY[employer] [place] [id number] Left 1 Implanted       Procedure(s):  LEFT DISTAL RADIUS OPEN REDUCTION INTERNAL FIXATION     Surgeon(s):  Sol Berrios MD    Procedure(s):  LEFT DISTAL RADIUS OPEN REDUCTION INTERNAL FIXATION     ANESTHESIA: Regional    ESTIMATED BLOOD LOSS: Minimal    COMPLICATIONS: None    TOURNIQUET TIME:   Total Tourniquet Time Documented:  Upper Arm (Left) - 32 minutes  Total: Upper Arm (Left) - 32 minutes      INDICATION FOR PROCEDURE:  Climmie Contras sustained the above mentioned injuries as a result of a fall.   Surgical and non-surgical treatment options were discussed with the patient and their family, as well as the risk and benefits of each option. Together, we decided to proceed with surgical management of their fracture. Specific to this treatment plan, we discussed in detail surgical risks including scar, pain, bleeding, infection, anesthetic risks, neurovascular injury, nonunion, malunion, hardware loosening or failure, need for further surgery,  weakness, stiffness, risk of death and potential risk of other unforseen complication. The patient did consent to the procedure after discussion of the risks and benefits. DESCRIPTION OF PROCEDURE:  The patient was identified in the holding room. The Left wrist was marked and confirmed as the correct operative site. They were then brought to the OR and general anesthesia was induced. They were transferred onto the OR table in the supine position. All bony prominences were well padded. SCDs were placed on the bilateral legs throughout the case. A timeout was performed, verifying the correct patient, the correct side and the correct procedure. Antibiotics were then administered, and were redosed during the procedure as needed at indicated intervals. A non-sterile tourniquet was placed on the arm    The upper extremity and shoulder girdle was pre scrubbed and then prepped and draped in routine sterile fashion. An incisional timeout was performed re-confirming the correct patient, surgical site and procedure, as well as verifying antibiotics. A volar FCR approach was utilized. Dissection was taken down through skin and subcutaneous tissue. Hemostasis was achieved with bipolar cautery. The FCR sheath was incised and it was retracted ulnarly. The FCR subsheath was incised. The pronator quadratus was then sharply released from the radius in an L-shaped fashion and was retracted ulnarly to expose the distal radius. Dissection was taken down to bone.   The fracture was identified and inspected. Fracture was reduced with manipulation and placement of a [place] elevator into the fracture to unhinge the volar cortex. Reduction was confirmed with fluoroscopy. A distal radius plate was chosen and placed along the volar surface. Once plate position was confirmed, the plate was secured to the bone with a nonlocking screw into the oblong hole. The distal row of screw holes were filled with locking screws of measured length after unicortical drilling. The plate was secured at the shaft with two additional screws, one more nonlocking and one locking screw. AP and lateral x-rays were obtained, showing adequate fracture reduction with restoration of height, palmar tilt and radial inclination. Articular wrist view was used after placement of each distal screw to demonstrated that all screws were outside of the joint. Final x-rays were obtained. These showed adequate reduction of the fracture, as well as adequate plate and screw placement and length. The tourniquet was deflated and hemostasis was ensured. The wounds were then thoroughly irrigated and closed in layered fashion with 4-0 vicryl and 4-0 stratafix. A sterile dressing was applied followed by a Volar splint    The patient was awakened and taken to PACU in stable condition. All sponge and needle counts were correct at the end of the case. I was present and scrubbed for the entire procedure. Fluosocopy was utilized during this case and the images were saved for reference.     DISPOSITION : Home    MECHANICAL VTE (DVT) PROPHYLAXIS: sequential compression devices    CHEMICAL VTE (DVT) PROPHYLAXIS: None warranted for this case    WEIGHT BEARING STATUS:   NWB on the Left upper extremity    FOLLOW-UP: in 10-14 days for suture removal.       Erica King MD, PhD  Orthopaedic Surgery  06/24/21  12:16 PM

## 2021-06-24 NOTE — ANESTHESIA POSTPROCEDURE EVALUATION
Procedure(s):  LEFT DISTAL RADIUS OPEN REDUCTION INTERNAL FIXATION . general    Anesthesia Post Evaluation      Multimodal analgesia: multimodal analgesia used between 6 hours prior to anesthesia start to PACU discharge  Patient location during evaluation: PACU  Patient participation: complete - patient participated  Level of consciousness: awake and alert  Pain management: adequate  Airway patency: patent  Anesthetic complications: no  Cardiovascular status: acceptable  Respiratory status: acceptable, spontaneous ventilation and nonlabored ventilation  Hydration status: acceptable  Post anesthesia nausea and vomiting:  none      INITIAL Post-op Vital signs:   Vitals Value Taken Time   /84 06/24/21 1236   Temp 37.2 °C (98.9 °F) 06/24/21 1220   Pulse 66 06/24/21 1238   Resp 12 06/24/21 1220   SpO2 93 % 06/24/21 1238   Vitals shown include unvalidated device data.

## 2021-06-24 NOTE — INTERVAL H&P NOTE
H&P Update:  Maria D Abraham was seen and examined. History and physical has been reviewed. The patient has been examined.  There have been no significant clinical changes since the completion of the originally dated History and Physical.    Luis Cancino MD  Orthopaedic Surgery  06/24/21  10:14 AM

## 2021-06-24 NOTE — ANESTHESIA PREPROCEDURE EVALUATION
Relevant Problems   NEUROLOGY   (+) Mild episode of recurrent major depressive disorder (HCC)      PERSONAL HX & FAMILY HX OF CANCER   (+) Malignant neoplasm of urinary bladder (HCC)   (+) Prostate cancer (HCC)       Anesthetic History   No history of anesthetic complications            Review of Systems / Medical History  Patient summary reviewed, nursing notes reviewed and pertinent labs reviewed    Pulmonary    COPD: mild      Smoker         Neuro/Psych         Psychiatric history     Cardiovascular    Hypertension              Exercise tolerance: >4 METS     GI/Hepatic/Renal     GERD: well controlled           Endo/Other        Arthritis and cancer (bladder cancer)  Pertinent negatives: No hypothyroidism   Other Findings   Comments: Chronic back pain           Physical Exam    Airway  Mallampati: II           Cardiovascular               Dental    Dentition: Poor dentition     Pulmonary                 Abdominal         Other Findings            Anesthetic Plan    ASA: 2  Anesthesia type: total IV anesthesia - supraclavicular block      Post-op pain plan if not by surgeon: peripheral nerve block single    Induction: Intravenous  Anesthetic plan and risks discussed with: Patient

## 2021-06-24 NOTE — ANESTHESIA PROCEDURE NOTES
Peripheral Block    Start time: 6/24/2021 10:23 AM  End time: 6/24/2021 10:27 AM  Performed by: Griselda Marts, MD  Authorized by: Griselda Marts, MD       Pre-procedure: Indications: at surgeon's request and post-op pain management    Preanesthetic Checklist: patient identified, risks and benefits discussed, site marked, timeout performed, anesthesia consent given and patient being monitored    Timeout Time: 10:23 EDT          Block Type:   Block Type:  Supraclavicular  Laterality:  Left  Monitoring:  Frequent vital sign checks, heart rate, oxygen, continuous pulse ox and responsive to questions  Injection Technique:  Single shot  Procedures: ultrasound guided    Patient Position: supine  Prep: chlorhexidine    Location:  Supraclavicular  Needle Type:  Stimuplex  Needle Gauge:  20 G  Needle Localization:  Ultrasound guidance  Medication Injected:  Ropivacaine 0.5% with epinephrine 1:200,000 injection, 30 mL (Mixture components: EPINEPHrine HCl (PF) 1 mg/mL (1 mL) Soln, . 005 mL; ropivacaine (PF) 5 mg/mL (0.5 %) Soln, 1 mL)  dexamethasone (DECADRON) 4 mg/mL injection, 4 mg  Med Admin Time: 6/24/2021 10:27 AM    Assessment:  Number of attempts:  1  Injection Assessment:  Incremental injection every 5 mL, negative aspiration for CSF, no paresthesia, ultrasound image on chart, no intravascular symptoms, negative aspiration for blood and local visualized surrounding nerve on ultrasound  Patient tolerance:  Patient tolerated the procedure well with no immediate complications

## 2021-06-25 ENCOUNTER — ANESTHESIA EVENT (OUTPATIENT)
Dept: SURGERY | Age: 71
End: 2021-06-25
Payer: MEDICARE

## 2021-06-25 ENCOUNTER — APPOINTMENT (OUTPATIENT)
Dept: GENERAL RADIOLOGY | Age: 71
End: 2021-06-25
Attending: ORTHOPAEDIC SURGERY
Payer: MEDICARE

## 2021-06-25 ENCOUNTER — HOSPITAL ENCOUNTER (OUTPATIENT)
Age: 71
Setting detail: OUTPATIENT SURGERY
Discharge: HOME | End: 2021-06-25
Attending: ORTHOPAEDIC SURGERY | Admitting: ORTHOPAEDIC SURGERY
Payer: MEDICARE

## 2021-06-25 ENCOUNTER — ANESTHESIA (OUTPATIENT)
Dept: SURGERY | Age: 71
End: 2021-06-25
Payer: MEDICARE

## 2021-06-25 VITALS
TEMPERATURE: 98.8 F | RESPIRATION RATE: 16 BRPM | OXYGEN SATURATION: 99 % | SYSTOLIC BLOOD PRESSURE: 183 MMHG | HEART RATE: 60 BPM | DIASTOLIC BLOOD PRESSURE: 91 MMHG

## 2021-06-25 PROCEDURE — 74011250637 HC RX REV CODE- 250/637: Performed by: ANESTHESIOLOGY

## 2021-06-25 PROCEDURE — 76060000031 HC ANESTHESIA FIRST 0.5 HR: Performed by: ORTHOPAEDIC SURGERY

## 2021-06-25 PROCEDURE — 76210000006 HC OR PH I REC 0.5 TO 1 HR: Performed by: ORTHOPAEDIC SURGERY

## 2021-06-25 PROCEDURE — 74011250636 HC RX REV CODE- 250/636: Performed by: NURSE ANESTHETIST, CERTIFIED REGISTERED

## 2021-06-25 PROCEDURE — 74011000250 HC RX REV CODE- 250: Performed by: NURSE ANESTHETIST, CERTIFIED REGISTERED

## 2021-06-25 PROCEDURE — 76010000154 HC OR TIME FIRST 0.5 HR: Performed by: ORTHOPAEDIC SURGERY

## 2021-06-25 PROCEDURE — 77030036560 HC SHLDR ARM PAD ABDUCTN S2SG -B: Performed by: ORTHOPAEDIC SURGERY

## 2021-06-25 PROCEDURE — 74011250636 HC RX REV CODE- 250/636: Performed by: ANESTHESIOLOGY

## 2021-06-25 PROCEDURE — 76210000020 HC REC RM PH II FIRST 0.5 HR: Performed by: ORTHOPAEDIC SURGERY

## 2021-06-25 PROCEDURE — 23650 CLTX SHO DSLC W/MNPJ WO ANES: CPT | Performed by: ORTHOPAEDIC SURGERY

## 2021-06-25 RX ORDER — SODIUM CHLORIDE 0.9 % (FLUSH) 0.9 %
5-40 SYRINGE (ML) INJECTION EVERY 8 HOURS
Status: DISCONTINUED | OUTPATIENT
Start: 2021-06-25 | End: 2021-06-25 | Stop reason: HOSPADM

## 2021-06-25 RX ORDER — SODIUM CHLORIDE 0.9 % (FLUSH) 0.9 %
5-40 SYRINGE (ML) INJECTION AS NEEDED
Status: CANCELLED | OUTPATIENT
Start: 2021-06-25

## 2021-06-25 RX ORDER — LIDOCAINE HYDROCHLORIDE 10 MG/ML
0.1 INJECTION INFILTRATION; PERINEURAL AS NEEDED
Status: DISCONTINUED | OUTPATIENT
Start: 2021-06-25 | End: 2021-06-25 | Stop reason: HOSPADM

## 2021-06-25 RX ORDER — HYDROCODONE BITARTRATE AND ACETAMINOPHEN 5; 325 MG/1; MG/1
1 TABLET ORAL AS NEEDED
Status: DISCONTINUED | OUTPATIENT
Start: 2021-06-25 | End: 2021-06-25 | Stop reason: HOSPADM

## 2021-06-25 RX ORDER — SODIUM CHLORIDE, SODIUM LACTATE, POTASSIUM CHLORIDE, CALCIUM CHLORIDE 600; 310; 30; 20 MG/100ML; MG/100ML; MG/100ML; MG/100ML
150 INJECTION, SOLUTION INTRAVENOUS CONTINUOUS
Status: DISCONTINUED | OUTPATIENT
Start: 2021-06-25 | End: 2021-06-25 | Stop reason: HOSPADM

## 2021-06-25 RX ORDER — FAMOTIDINE 20 MG/1
20 TABLET, FILM COATED ORAL ONCE
Status: COMPLETED | OUTPATIENT
Start: 2021-06-25 | End: 2021-06-25

## 2021-06-25 RX ORDER — LIDOCAINE HYDROCHLORIDE 20 MG/ML
INJECTION, SOLUTION EPIDURAL; INFILTRATION; INTRACAUDAL; PERINEURAL AS NEEDED
Status: DISCONTINUED | OUTPATIENT
Start: 2021-06-25 | End: 2021-06-25 | Stop reason: HOSPADM

## 2021-06-25 RX ORDER — ACETAMINOPHEN 500 MG
1000 TABLET ORAL ONCE
Status: COMPLETED | OUTPATIENT
Start: 2021-06-25 | End: 2021-06-25

## 2021-06-25 RX ORDER — ACETAMINOPHEN 500 MG
1000 TABLET ORAL
Status: DISCONTINUED | OUTPATIENT
Start: 2021-06-25 | End: 2021-06-25 | Stop reason: HOSPADM

## 2021-06-25 RX ORDER — SODIUM CHLORIDE 0.9 % (FLUSH) 0.9 %
5-40 SYRINGE (ML) INJECTION EVERY 8 HOURS
Status: CANCELLED | OUTPATIENT
Start: 2021-06-25

## 2021-06-25 RX ORDER — SODIUM CHLORIDE 9 MG/ML
50 INJECTION, SOLUTION INTRAVENOUS CONTINUOUS
Status: DISCONTINUED | OUTPATIENT
Start: 2021-06-25 | End: 2021-06-25 | Stop reason: HOSPADM

## 2021-06-25 RX ORDER — SODIUM CHLORIDE 0.9 % (FLUSH) 0.9 %
5-40 SYRINGE (ML) INJECTION AS NEEDED
Status: DISCONTINUED | OUTPATIENT
Start: 2021-06-25 | End: 2021-06-25 | Stop reason: HOSPADM

## 2021-06-25 RX ORDER — MIDAZOLAM HYDROCHLORIDE 1 MG/ML
2 INJECTION, SOLUTION INTRAMUSCULAR; INTRAVENOUS
Status: DISCONTINUED | OUTPATIENT
Start: 2021-06-25 | End: 2021-06-25 | Stop reason: HOSPADM

## 2021-06-25 RX ORDER — HYDROMORPHONE HYDROCHLORIDE 1 MG/ML
0.5 INJECTION, SOLUTION INTRAMUSCULAR; INTRAVENOUS; SUBCUTANEOUS
Status: DISCONTINUED | OUTPATIENT
Start: 2021-06-25 | End: 2021-06-25 | Stop reason: HOSPADM

## 2021-06-25 RX ORDER — SUCCINYLCHOLINE CHLORIDE 20 MG/ML
INJECTION INTRAMUSCULAR; INTRAVENOUS AS NEEDED
Status: DISCONTINUED | OUTPATIENT
Start: 2021-06-25 | End: 2021-06-25 | Stop reason: HOSPADM

## 2021-06-25 RX ORDER — FENTANYL CITRATE 50 UG/ML
100 INJECTION, SOLUTION INTRAMUSCULAR; INTRAVENOUS ONCE
Status: DISCONTINUED | OUTPATIENT
Start: 2021-06-25 | End: 2021-06-25 | Stop reason: HOSPADM

## 2021-06-25 RX ORDER — PROPOFOL 10 MG/ML
INJECTION, EMULSION INTRAVENOUS AS NEEDED
Status: DISCONTINUED | OUTPATIENT
Start: 2021-06-25 | End: 2021-06-25 | Stop reason: HOSPADM

## 2021-06-25 RX ADMIN — PROPOFOL 150 MG: 10 INJECTION, EMULSION INTRAVENOUS at 14:25

## 2021-06-25 RX ADMIN — SUCCINYLCHOLINE CHLORIDE 40 MG: 20 INJECTION, SOLUTION INTRAMUSCULAR; INTRAVENOUS at 14:26

## 2021-06-25 RX ADMIN — LIDOCAINE HYDROCHLORIDE 50 MG: 20 INJECTION, SOLUTION EPIDURAL; INFILTRATION; INTRACAUDAL; PERINEURAL at 14:25

## 2021-06-25 RX ADMIN — ACETAMINOPHEN 1000 MG: 500 TABLET ORAL at 14:14

## 2021-06-25 RX ADMIN — SODIUM CHLORIDE, SODIUM LACTATE, POTASSIUM CHLORIDE, AND CALCIUM CHLORIDE 150 ML/HR: 600; 310; 30; 20 INJECTION, SOLUTION INTRAVENOUS at 14:15

## 2021-06-25 RX ADMIN — FAMOTIDINE 20 MG: 20 TABLET ORAL at 14:14

## 2021-06-25 NOTE — H&P
History and Physical    Subjective:     Ragini Edwards is a 69 y.o. who was in surgery yesterday for a left distal radius open reduction internal fixation, he has a history of right reverse total shoulder arthroplasty by Dr. Jennifer Arora, he says he woke up yesterday morning before the surgery and his right shoulder was swollen and painful but he did not pay much attention to it, he kept having worsening pain, he was seen by Dr. uJstyn Gee in clinic today with x-rays which demonstrated a anterior dislocation of the reverse shoulder arthroplasty, he is here for close reduction under general anesthesia. Past Medical History:   Diagnosis Date    Arthritis     osteo    Broken arm 06/18/2021    left     Cancer ([facility] 2019    bladder cancer---- surg and instillation-- followed by dr Mario Barrera    Chronic pain     back    COPD (chronic obstructive pulmonary disease) ([facility] 09/28/2016    pt denies---- no meds    Hypercholesterolemia     Hypertension     hx-- off meds x 1 1/2 yr-- followed by dr Jimmy Henley Hypothyroidism 9/28/2016    Long-term use of high-risk medication 8/26/2019    Osteopenia 9/28/2016    Prostate cancer ([facility] 2016    surg only      Past Surgical History:   Procedure Laterality Date    HX HERNIA REPAIR      HX ORTHOPAEDIC Right 03/25/2021    shoulder    HX PARTIAL THYROIDECTOMY Left     HX PROSTATECTOMY  2016    HX UROLOGICAL      bladder tumor removed     Family History   Problem Relation Age of Onset    Cancer Brother       Social History     Tobacco Use    Smoking status: Current Every Day Smoker     Packs/day: 3.00     Years: 59.00     Pack years: 177.00     Types: Cigarettes     Start date: 1/1/1963    Smokeless tobacco: Never Used    Tobacco comment: has been tapering off-- down to . 5ppd   Substance Use Topics    Alcohol use:  Yes     Alcohol/week: 14.0 standard drinks     Types: 14 Cans of beer per week       Prior to Admission medications    Medication Sig Start Date End Date Taking? Authorizing Provider   oxyCODONE IR (ROXICODONE) 5 mg immediate release tablet Take 1 Tablet by mouth every four (4) hours as needed for Pain for up to 7 days. Max Daily Amount: 30 mg. Patient not taking: Reported on 6/24/2021 6/23/21 6/30/21  Yina Mirza NP   ondansetron (ZOFRAN ODT) 4 mg disintegrating tablet Take 1 Tablet by mouth every eight (8) hours as needed for Nausea. Patient not taking: Reported on 6/24/2021 6/23/21   Yina Mirza NP   traMADoL [facility]) 50 mg tablet Take 1 Tablet by mouth every six (6) hours as needed for Pain for up to 7 days. Max Daily Amount: 200 mg. 6/22/21 6/29/21  Valerio Crockett MD   pyridoxine, vitamin B6, (Vitamin B-6) 100 mg tablet Take 100 mg by mouth daily. Provider, Historical   oxybutynin (DITROPAN) 5 mg tablet Take 5 mg by mouth daily. 12/29/20   Provider, Historical   loratadine (CLARITIN) 10 mg tablet Take 10 mg by mouth daily as needed. Provider, Historical   celecoxib (CELEBREX) 200 mg capsule Take 1 Cap by mouth daily. 2/9/21   Tyler Donaldson DO   pantoprazole (Protonix) 40 mg tablet Take 1 Tab by mouth daily. Indications: gastroesophageal reflux disease 8/31/20   Amauri PARKER DO   atorvastatin (LIPITOR) 10 mg tablet Take 1 Tab by mouth daily. 8/31/20   Orininfa Donaldson DO     No Known Allergies     Review of Systems:  A comprehensive review of systems was negative except for that written in the History of Present Illness. Objective: Intake and Output:    No intake/output data recorded. No intake/output data recorded. Physical Exam:   NAD, heart with regular rate and rhythm    Extremity exam:  Significant swelling of the right shoulder, neurovascular intact distally    Data Review:   No results found for this or any previous visit (from the past 24 hour(s)).     Imaging/electrodiagnostic testing review:     X-ray of the right shoulder demonstrates an anterior dislocation of a reverse shoulder arthroplasty    Assessment: Dislocated right reverse total shoulder arthroplasty    Plan:     Plan for closed reduction under general anesthesia of right total shoulder arthroplasty    Quinton Dos Santos MD  06/25/21  2:07 PM

## 2021-06-25 NOTE — ANESTHESIA POSTPROCEDURE EVALUATION
Procedure(s):  CLOSED REDUCTION UPPER EXTREMITY. general    Anesthesia Post Evaluation      Multimodal analgesia: multimodal analgesia used between 6 hours prior to anesthesia start to PACU discharge  Patient location during evaluation: bedside  Patient participation: complete - patient participated  Level of consciousness: awake and alert  Pain management: adequate  Airway patency: patent  Anesthetic complications: no  Cardiovascular status: hemodynamically stable  Respiratory status: spontaneous ventilation  Hydration status: euvolemic  Comments: Patient stable and may discharge at this time. Post anesthesia nausea and vomiting:  none  Final Post Anesthesia Temperature Assessment:  Normothermia (36.0-37.5 degrees C)      INITIAL Post-op Vital signs:   Vitals Value Taken Time   /91 06/25/21 1512   Temp 37.2 °C (98.9 °F) 06/25/21 1443   Pulse 58 06/25/21 1514   Resp 12 06/25/21 1446   SpO2 98 % 06/25/21 1514   Vitals shown include unvalidated device data.

## 2021-06-25 NOTE — PERIOP NOTES
Ex-wife at patient's bedside. Discharge instructions reviewed with both patient and his ex-wife.  They voice understanding at this time with no questions or concerns

## 2021-06-25 NOTE — OP NOTES
ORTHOPAEDIC SURGICAL NOTE        Liane Krishnamurthy male 69 y.o.  [id number]   6/25/2021     PRE-OP DIAGNOSIS: Dislocation of right shoulder joint, initial encounter [S43.004A]   POST-OP DIAGNOSIS: Dislocation of right shoulder joint, initial encounter [S43.004A]   LATERALITY: Right     PROCEDURES PERFORMED:   Right reverse total shoulder dislocation closed reduction under general anesthesia with manipulation ([id number])       SURGEON:   Radha Contreras MD, PhD     IMPLANTS:   * No implants in log *   Procedure(s):  CLOSED REDUCTION UPPER EXTREMITY   Surgeon(s):  Mariano Shukla MD   Procedure(s):  CLOSED REDUCTION UPPER EXTREMITY     ANESTHESIA: General     STAFF:    Circ-1: Nata Alvarez  Scrub Tech-1: Elsa PELAEZ     ESTIMATED BLOOD LOSS: Minimal       TOTAL IV FLUIDS : See anesthesia note    COMPLICATIONS: None     DRAINS:       TOURNIQUET TIME: * No tourniquets in log *     INDICATION FOR PROCEDURE:     Liane Krihsnamurthy has a dislocated right reverse total shoulder arthroplasty, he is unsure when this happened, he had surgery for left distal radius fracture yesterday but he believes the right shoulder was swelling when he woke up in the morning before the surgery. Surgical and non-surgical treatment options were discussed with the patient and their family, as well as the risk and benefits of each option. After thorough discussion, the patient decided to proceed with surgical management. Specific to this treatment plan, we discussed in detail surgical risks including scar, pain, bleeding, infection, anesthetic risks, neurovascular injury, failure to achieve desired results, hardware problems, need for further surgery,  weakness, stiffness, risk of death and potential risk of other unforseen complication. The patient consented to the procedure after discussion of the risks and benefits. DESCRIPTION OF PROCEDURE:     The patient was identified in the holding room.  The right shoulder was marked and confirmed as the correct operative site. They were then brought to the OR and transferred onto the OR table in the supine position. All bony prominences were well padded. SCDs were placed on the bilateral legs throughout the case. A timeout was performed, verifying the correct patient, the correct side  and the correct procedure. Antibiotics were then administered, and were redosed during the procedure as needed at indicated intervals. A non-sterile tourniquet was placed on the arm. The upper extremity was pre scrubbed and then prepped and draped in routine sterile fashion. An incisional timeout was performed re-confirming the correct patient, surgical site and procedure, as well as verifying antibiotics. Close reduction was achieved with a combination of axial traction, external rotation followed by internal rotation and forward flexion of the humerus, a clear clunk was felt, after the clunk shoulder motion was much smoother, orthogonal fluoroscopy demonstrated good reduction of the total shoulder arthroplasty including a axillary view    The tourniquet was deflated and hemostasis was ensured. The wounds were then thoroughly irrigated and closed in layered fashion with 4-0 Vicryl and 4-0 Stratafix. A shoulder immobilizer was then applied       The patient was awakened and taken to PACU in stable condition. All sponge and needle counts were correct at the end of the case. I was present and scrubbed for the entire procedure. DISPOSITION:    The patient will be discharged home. Weightbearing status: NWB       CHEMICAL VTE (DVT) PROPHYLAXIS: None warranted for this case     FOLLOW-UP:  10-14 days for wound check and XRs if needed.      Reanna Prince MD    06/25/21  2:39 PM

## 2021-06-25 NOTE — ANESTHESIA PREPROCEDURE EVALUATION
Relevant Problems   NEUROLOGY   (+) Mild episode of recurrent major depressive disorder (HCC)      PERSONAL HX & FAMILY HX OF CANCER   (+) Malignant neoplasm of urinary bladder (HCC)   (+) Prostate cancer (HCC)       Anesthetic History   No history of anesthetic complications            Review of Systems / Medical History  Patient summary reviewed, nursing notes reviewed and pertinent labs reviewed    Pulmonary    COPD: mild      Smoker         Neuro/Psych         Psychiatric history     Cardiovascular    Hypertension              Exercise tolerance: >4 METS     GI/Hepatic/Renal     GERD: well controlled           Endo/Other        Arthritis and cancer (bladder cancer, prostate , thyroid s/p thyroidecomy)  Pertinent negatives: No hypothyroidism   Other Findings   Comments: Chronic back pain           Physical Exam    Airway  Mallampati: II  TM Distance: 4 - 6 cm  Neck ROM: normal range of motion   Mouth opening: Normal     Cardiovascular  Regular rate and rhythm,  S1 and S2 normal,  no murmur, click, rub, or gallop  Rhythm: regular  Rate: normal         Dental    Dentition: Poor dentition  Comments:  Only one tooth   Pulmonary  Breath sounds clear to auscultation               Abdominal         Other Findings            Anesthetic Plan    ASA: 3  Anesthesia type: general - supraclavicular block          Induction: Intravenous  Anesthetic plan and risks discussed with: Patient

## 2021-06-25 NOTE — DISCHARGE INSTRUCTIONS
Keep sling in place until follow-up appointment. MEDICATION INTERACTION:  During your procedure you potentially received a medication or medications which may reduce the effectiveness of oral contraceptives. Please consider other forms of contraception for 1 month following your procedure if you are currently using oral contraceptives as your primary form of birth control. In addition to this, we recommend continuing your oral contraceptive as prescribed, unless otherwise instructed by your physician, during this time    After general anesthesia or intravenous sedation, for 24 hours or while taking prescription Narcotics:  · Limit your activities  · A responsible adult needs to be with you for the next 24 hours  · Do not drive and operate hazardous machinery  · Do not make important personal or business decisions  · Do not drink alcoholic beverages  · If you have not urinated within 8 hours after discharge, and you are experiencing discomfort from urinary retention, please go to the nearest ED. · If you have sleep apnea and have a CPAP machine, please use it for all naps and sleeping. · Please use caution when taking narcotics and any of your home medications that may cause drowsiness. *  Please give a list of your current medications to your Primary Care Provider. *  Please update this list whenever your medications are discontinued, doses are      changed, or new medications (including over-the-counter products) are added. *  Please carry medication information at all times in case of emergency situations. These are general instructions for a healthy lifestyle:  No smoking/ No tobacco products/ Avoid exposure to second hand smoke  Surgeon General's Warning:  Quitting smoking now greatly reduces serious risk to your health.   Obesity, smoking, and sedentary lifestyle greatly increases your risk for illness  A healthy diet, regular physical exercise & weight monitoring are important for maintaining a healthy lifestyle    You may be retaining fluid if you have a history of heart failure or if you experience any of the following symptoms:  Weight gain of 3 pounds or more overnight or 5 pounds in a week, increased swelling in our hands or feet or shortness of breath while lying flat in bed. Please call your doctor as soon as you notice any of these symptoms; do not wait until your next office visit.

## 2021-06-30 ENCOUNTER — HOSPITAL ENCOUNTER (OUTPATIENT)
Dept: SURGERY | Age: 71
Discharge: HOME | End: 2021-06-30

## 2021-06-30 ENCOUNTER — HOSPITAL ENCOUNTER (OUTPATIENT)
Dept: LAB | Age: 71
Discharge: HOME | End: 2021-06-30
Attending: ORTHOPAEDIC SURGERY
Payer: MEDICARE

## 2021-06-30 ENCOUNTER — ANESTHESIA EVENT (OUTPATIENT)
Dept: SURGERY | Age: 71
End: 2021-06-30
Payer: MEDICARE

## 2021-06-30 VITALS — BODY MASS INDEX: 22.44 KG/M2 | WEIGHT: 143 LBS | HEIGHT: 67 IN

## 2021-06-30 PROBLEM — Z96.619 DISLOCATION OF PROSTHETIC JOINT OF SHOULDER (HCC): Status: ACTIVE | Noted: 2021-06-30

## 2021-06-30 PROBLEM — Z96.611 S/P REVERSE TOTAL SHOULDER ARTHROPLASTY, RIGHT: Status: ACTIVE | Noted: 2021-06-30

## 2021-06-30 PROBLEM — T84.028A DISLOCATION OF PROSTHETIC JOINT OF SHOULDER (HCC): Status: ACTIVE | Noted: 2021-06-30

## 2021-06-30 LAB
ALBUMIN SERPL-MCNC: 3.6 G/DL (ref 3.2–4.6)
ALBUMIN/GLOB SERPL: 1 {RATIO} (ref 1.2–3.5)
ALP SERPL-CCNC: 74 U/L (ref 50–136)
ALT SERPL-CCNC: 30 U/L (ref 12–65)
ANION GAP SERPL CALC-SCNC: 6 MMOL/L (ref 7–16)
APPEARANCE UR: CLEAR
APTT PPP: 28.1 SEC (ref 24.1–35.1)
AST SERPL-CCNC: 13 U/L (ref 15–37)
BACTERIA SPEC CULT: NORMAL
BASOPHILS # BLD: 0.1 K/UL (ref 0–0.2)
BASOPHILS NFR BLD: 1 % (ref 0–2)
BILIRUB SERPL-MCNC: 0.2 MG/DL (ref 0.2–1.1)
BILIRUB UR QL: NEGATIVE
BUN SERPL-MCNC: 18 MG/DL (ref 8–23)
CALCIUM SERPL-MCNC: 9.4 MG/DL (ref 8.3–10.4)
CHLORIDE SERPL-SCNC: 102 MMOL/L (ref 98–107)
CO2 SERPL-SCNC: 29 MMOL/L (ref 21–32)
COLOR UR: YELLOW
CREAT SERPL-MCNC: 0.91 MG/DL (ref 0.8–1.5)
DIFFERENTIAL METHOD BLD: ABNORMAL
EOSINOPHIL # BLD: 0.2 K/UL (ref 0–0.8)
EOSINOPHIL NFR BLD: 3 % (ref 0.5–7.8)
ERYTHROCYTE [DISTWIDTH] IN BLOOD BY AUTOMATED COUNT: 15.1 % (ref 11.9–14.6)
GLOBULIN SER CALC-MCNC: 3.7 G/DL (ref 2.3–3.5)
GLUCOSE SERPL-MCNC: 85 MG/DL (ref 65–100)
GLUCOSE UR STRIP.AUTO-MCNC: NEGATIVE MG/DL
HCT VFR BLD AUTO: 38.4 % (ref 41.1–50.3)
HGB BLD-MCNC: 12.5 G/DL (ref 13.6–17.2)
HGB UR QL STRIP: NEGATIVE
IMM GRANULOCYTES # BLD AUTO: 0.1 K/UL (ref 0–0.5)
IMM GRANULOCYTES NFR BLD AUTO: 1 % (ref 0–5)
INR PPP: 1
KETONES UR QL STRIP.AUTO: NEGATIVE MG/DL
LEUKOCYTE ESTERASE UR QL STRIP.AUTO: NEGATIVE
LYMPHOCYTES # BLD: 2.8 K/UL (ref 0.5–4.6)
LYMPHOCYTES NFR BLD: 36 % (ref 13–44)
MAGNESIUM SERPL-MCNC: 2.2 MG/DL (ref 1.8–2.4)
MCH RBC QN AUTO: 29.6 PG (ref 26.1–32.9)
MCHC RBC AUTO-ENTMCNC: 32.6 G/DL (ref 31.4–35)
MCV RBC AUTO: 90.8 FL (ref 79.6–97.8)
MONOCYTES # BLD: 0.9 K/UL (ref 0.1–1.3)
MONOCYTES NFR BLD: 12 % (ref 4–12)
NEUTS SEG # BLD: 3.7 K/UL (ref 1.7–8.2)
NEUTS SEG NFR BLD: 47 % (ref 43–78)
NITRITE UR QL STRIP.AUTO: NEGATIVE
NRBC # BLD: 0 K/UL (ref 0–0.2)
PH UR STRIP: 5 [PH] (ref 5–9)
PLATELET # BLD AUTO: 391 K/UL (ref 150–450)
PMV BLD AUTO: 8.9 FL (ref 9.4–12.3)
POTASSIUM SERPL-SCNC: 4.3 MMOL/L (ref 3.5–5.1)
PROT SERPL-MCNC: 7.3 G/DL (ref 6.3–8.2)
PROT UR STRIP-MCNC: NEGATIVE MG/DL
PROTHROMBIN TIME: 13.3 SEC (ref 12.5–14.7)
RBC # BLD AUTO: 4.23 M/UL (ref 4.23–5.6)
SERVICE CMNT-IMP: NORMAL
SODIUM SERPL-SCNC: 137 MMOL/L (ref 136–145)
SP GR UR REFRACTOMETRY: 1.02 (ref 1–1.02)
UROBILINOGEN UR QL STRIP.AUTO: 0.2 EU/DL (ref 0.2–1)
WBC # BLD AUTO: 7.8 K/UL (ref 4.3–11.1)

## 2021-06-30 PROCEDURE — 85610 PROTHROMBIN TIME: CPT

## 2021-06-30 PROCEDURE — 85730 THROMBOPLASTIN TIME PARTIAL: CPT

## 2021-06-30 PROCEDURE — 36415 COLL VENOUS BLD VENIPUNCTURE: CPT

## 2021-06-30 PROCEDURE — 83735 ASSAY OF MAGNESIUM: CPT

## 2021-06-30 PROCEDURE — 81003 URINALYSIS AUTO W/O SCOPE: CPT

## 2021-06-30 PROCEDURE — 85025 COMPLETE CBC W/AUTO DIFF WBC: CPT

## 2021-06-30 PROCEDURE — 80053 COMPREHEN METABOLIC PANEL: CPT

## 2021-06-30 PROCEDURE — 87641 MR-STAPH DNA AMP PROBE: CPT

## 2021-06-30 RX ORDER — SODIUM CHLORIDE 0.9 % (FLUSH) 0.9 %
5-40 SYRINGE (ML) INJECTION EVERY 8 HOURS
Status: CANCELLED | OUTPATIENT
Start: 2021-06-30

## 2021-06-30 RX ORDER — CEFAZOLIN SODIUM/WATER 2 G/20 ML
2 SYRINGE (ML) INTRAVENOUS ONCE
Status: CANCELLED | OUTPATIENT
Start: 2021-06-30 | End: 2021-06-30

## 2021-06-30 RX ORDER — SODIUM CHLORIDE 0.9 % (FLUSH) 0.9 %
5-40 SYRINGE (ML) INJECTION AS NEEDED
Status: CANCELLED | OUTPATIENT
Start: 2021-06-30

## 2021-06-30 NOTE — PERIOP NOTES
EKG not needed dos. T&C needed dos. CBC, U/A, CMP, Magnesium, PT/PTT; results within anesthesia protocols.

## 2021-06-30 NOTE — H&P
Subjective:     Patient is a 69 y.o. RHD MALE WITH RIGHT SHOULDER PAIN. SEE OFFICE NOTE.     Patient Active Problem List    Diagnosis Date Noted    Dislocation of prosthetic joint of shoulder ([address] 06/30/2021    S/P reverse total shoulder arthroplasty, right 06/30/2021    Other intraarticular fracture of lower end of left radius, initial encounter for closed fracture 06/22/2021    Right rotator cuff tear arthropathy 03/25/2021    Rotator cuff tear arthropathy of right shoulder 03/20/2021    Refused influenza vaccine 02/21/2021    Pain in both hands 02/21/2021    Chronic right shoulder pain 02/21/2021    Bruit of left carotid artery 08/31/2020    Weakness generalized 08/31/2020    Malignant neoplasm of urinary bladder ([address] 11/14/2019    DDD (degenerative disc disease), lumbar 09/05/2019    Mild episode of recurrent major depressive disorder ([address] 08/28/2019    Chronic bilateral low back pain without sciatica 08/27/2019    History of thyroid cancer 08/27/2019    Dyslipidemia 08/26/2019    High risk medication use 08/26/2019    Mucous cyst of finger 03/20/2019    Tobacco abuse 09/01/2017    Chronic gastritis 09/28/2016    Prostate cancer ([address] 09/28/2016    Osteopenia 09/28/2016    Nuclear senile cataract 06/22/2016    Erectile dysfunction following radical prostatectomy 12/22/2015    History of adenomatous polyp of colon 04/30/2015     Past Medical History:   Diagnosis Date    Allergic rhinitis     Arthritis     osteo    Broken arm 06/18/2021    left     Cancer ([address] 2019    bladder cancer---- surg and instillation-- followed by dr Carolynn Alonso    Chronic pain     back    COPD (chronic obstructive pulmonary disease) ([facility] 09/28/2016    pt denies---- no meds    Current every day smoker     Hypercholesterolemia     Hypertension     hx-- off meds x 1 1/2 yr-- followed by dr Johan Mesa Hypothyroidism 9/28/2016    Long-term use of high-risk medication 8/26/2019    Marijuana user \"2-3 times/week\"    Osteopenia 9/28/2016    Prostate cancer ([facility] 2016    surg only    Urinary frequency       Past Surgical History:   Procedure Laterality Date    HX CATARACT REMOVAL Bilateral     HX HERNIA REPAIR      HX ORTHOPAEDIC Right 03/25/2021    shoulder    HX ORTHOPAEDIC Left 06/24/2021    distal radius ORIF    HX PARTIAL THYROIDECTOMY Left     HX PROSTATECTOMY  2016    HX UROLOGICAL      bladder tumor removed      Prior to Admission medications    Medication Sig Start Date End Date Taking? Authorizing Provider   oxyCODONE IR (ROXICODONE) 5 mg immediate release tablet Take 1 Tablet by mouth every four (4) hours as needed for Pain for up to 7 days. Max Daily Amount: 30 mg. Patient taking differently: Take 5 mg by mouth every four (4) hours as needed for Pain. Indications: pain 6/23/21 6/30/21  Cox, Nile Dakin, NP   ondansetron (ZOFRAN ODT) 4 mg disintegrating tablet Take 1 Tablet by mouth every eight (8) hours as needed for Nausea. Patient not taking: Reported on 6/24/2021 6/23/21   Cox, Nile Dakin, NP   pyridoxine, vitamin B6, (Vitamin B-6) 100 mg tablet Take 100 mg by mouth daily. Provider, Historical   oxybutynin (DITROPAN) 5 mg tablet Take 5 mg by mouth daily. Take / use AM day of surgery  per anesthesia protocols. 12/29/20   Provider, Historical   loratadine (CLARITIN) 10 mg tablet Take 10 mg by mouth daily as needed. Indications: inflammation of the nose due to an allergy    Provider, Historical   celecoxib (CELEBREX) 200 mg capsule Take 1 Cap by mouth daily. Patient taking differently: Take 200 mg by mouth daily. Indications: joint damage causing pain and loss of function 2/9/21   Almer Mail, DO   pantoprazole (Protonix) 40 mg tablet Take 1 Tab by mouth daily. Indications: gastroesophageal reflux disease  Patient taking differently: Take 40 mg by mouth daily. Take / use AM day of surgery  per anesthesia protocols.   Indications: gastroesophageal reflux disease 8/31/20 Shanae PARKER DO   atorvastatin (LIPITOR) 10 mg tablet Take 1 Tab by mouth daily. Patient taking differently: Take 10 mg by mouth daily. Take / use AM day of surgery  per anesthesia protocols. Indications: high cholesterol 8/31/20   Cece Martinez DO     No Known Allergies   Social History     Tobacco Use    Smoking status: Current Every Day Smoker     Packs/day: 3.00     Years: 59.00     Pack years: 177.00     Types: Cigarettes     Start date: 1/1/1963    Smokeless tobacco: Never Used    Tobacco comment: has been tapering off-- down to . 5ppd   Substance Use Topics    Alcohol use: Yes     Alcohol/week: 14.0 standard drinks     Types: 14 Cans of beer per week      Family History   Problem Relation Age of Onset    Cancer Brother       Review of Systems  A comprehensive review of systems was negative except for that written in the HPI. Objective:     No data found. There were no vitals taken for this visit. General:  Alert, cooperative, no distress, appears stated age. Head:  Normocephalic, without obvious abnormality, atraumatic. Back:   Symmetric, no curvature. ROM normal. No CVA tenderness. Lungs:   Clear to auscultation bilaterally. Chest wall:  No tenderness or deformity. Heart:  Regular rate and rhythm, S1, S2 normal, no murmur, click, rub or gallop. Extremities: Extremities normal, atraumatic, no cyanosis or edema. Pulses: 2+ and symmetric all extremities. Skin: Skin color, texture, turgor normal. No rashes or lesions   Lymph nodes: Cervical, supraclavicular, and axillary nodes normal.   Neurologic: CNII-XII intact. Normal strength, sensation and reflexes throughout.            Assessment:     Principal Problem:    Dislocation of prosthetic joint of shoulder ([address] (6/30/2021)    Active Problems:    S/P reverse total shoulder arthroplasty, right (6/30/2021)        Plan:     The various methods of treatment have been discussed with the patient and family. PATIENT HAS EXHAUSTED NON-OPERATIVE MODALITIES     After consideration of risks, benefits and other options for treatment, the patient has consented to surgical intervention. SEE OFFICE NOTE    Date of Surgery Update:  Brody [place] was seen and examined. History and physical has been reviewed. The patient has been examined.  There have been no significant clinical changes since the completion of the originally dated History and Physical.    Signed By: Lindsey Fofana MD     July 1, 2021 11:11 Ekta Gillespie MD

## 2021-06-30 NOTE — PERIOP NOTES
Patient verified name and . Order for consent  found in EHR and matches case posting; patient verifies procedure. Type 3 surgery, phone assessment complete. Orders received. Labs per surgeon: type and cross, MRSA swab, CBC with diff, UA, Pt, PTT, BMP, Magnesium~results:  pending  Labs per anesthesia protocol: EKG to be done day of surgery    Patient COVID test date 21; Patient did show for the appointment. The testing center is located at the [facility] [address] If appointment is needed-patient provided telephone number of [phone]. Patient answered medical/surgical history questions at their best of ability. All prior to admission medications documented in [facility]. Patient instructed to take the following medications the day of surgery according to anesthesia guidelines with a small sip of water: Pantoprazole, Atorvastatin, Oxybutynin. On the day before surgery please take Acetaminophen 1000mg in the morning and then again before bed. You may substitute for Tylenol 650 mg. Hold all vitamins 7 days prior to surgery and NSAIDS 5 days prior to surgery. Prescription meds to hold:none    Pt reminded to avoid marijuana use and no smoking 21 and day of surgery 21. Pt verbalized understanding. Patient instructed on the following:    > Arrive at A Entrance, time of arrival to be called the day before by 1700  > NPO after midnight including gum, mints, and ice chips  > Responsible adult must drive patient to the hospital, stay during surgery, and patient will need supervision 24 hours after anesthesia  > Use Hibiclens in shower the night before surgery and on the morning of surgery  > All piercings must be removed prior to arrival.    > Leave all valuables (money and jewelry) at home but bring insurance card and ID on DOS.  You may be required to pay a deductible or co-pay on the day of your procedure.   > Do not wear make-up, nail polish, lotions, cologne, perfumes, powders, or oil on skin. Artificial nails are not permitted.

## 2021-06-30 NOTE — H&P
Name: Mag Fajardo  YOB: 1950  Gender: male  MRN: [id number]        HPI: Mag Fajardo is a 69 y.o. right-hand-dominant gentleman over 3 months status post reverse right total shoulder arthroplasty with a delta extend prosthesis latissimus dorsi and teres major tendon transfer. His postop course was complicated. He fell and fractured his left wrist.  He underwent an ORIF of the left wrist by Dr. Terry Pozo on 6/24/2021. While he was transitioning from the stretcher to the bed he used his right arm to assist and felt a pop. He underwent the surgical procedure presumably with a dislocated reverse right total shoulder arthroplasty. Woke up in the recovery room with a prominence. Went home. That night and later that day he noted the onset of right shoulder pain. He contacted our office. Radiographs on Friday, 6/25/2021 demonstrated a dislocated reverse right total shoulder arthroplasty. He was taken back by Dr. Terry Pozo on 6/25/2021 underwent a successful closed reduction. He returns for follow-up exam.  He feels like his right shoulder is out. ROS/Meds/PSH/PMH/FH/SH: A ten system review of systems was performed and is negative other than what is in the HPI. Tobacco:  reports that he has been smoking cigarettes. He started smoking about 58 years ago. He has a 177.00 pack-year smoking history. He has never used smokeless tobacco.  There were no vitals taken for this visit. Physical Examination:  On exam he is awake alert pleasant gentleman ambulating without difficulty. His left arm has a splint in place consistent with his previous left wrist surgery. His right shoulder has a well-healed deltopectoral incision. There is a prominence anteriorly suspicious for periprosthetic dislocation. There is no erythema. He is neurovascularly intact    Data Reviewed:          XR: AP Y axillary views right shoulder   Clinical Indication  No diagnosis found.    Report: AP Y axillary views right shoulder demonstrate an anterior dislocation of a reverse right total shoulder arthroplasty    Impression: Anterior dislocation of a reverse right total shoulder arthroplasty   Anabel Mcfarlane MD                 Impression:     ICD-10-CM ICD-9-CM    1. Dislocation of prosthetic joint of shoulder, initial encounter ([facility] [address]  T84.028A 996.42 XR SHOULDER RT AP/LAT MIN 2 V    Z96.619     2. 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 Mackenzie Manzano Status post reverse right total shoulder arthroplasty with a delta extended prosthesis latissimus dorsi teres major tendon transfer over 3 months   AC arthritis right shoulder   Rotator cuff arthropathy left shoulder   Cervical spondylosis   Hypertension   History of prostate cancer stage undetermined   COPD with nicotine addiction   GERD   Osteopenia   History bladder cancer on BCG   Hypercholesterolemia    Plan:   I discussed the problem with the patient. He has recurrent instability in spite of a closed reduction. At this point given his clinical course in my opinion he has exhausted nonoperative modalities. I believe he would be candidate for a revision reverse right total shoulder arthroplasty. This will be performed utilizing general anesthesia with an interscalene block I keep him overnight 23 hours for observation. I would hold antibiotics. I would measure hemoglobin A1c and fasting glucose and obtain a CBC ESR CRP. We will get frozen section and cultures. He would then get weight adjusted vancomycin.   If there is evidence of infection he would require a removal of the prosthesis placement of a an antibiotic cement spacer a PICC line and 6 weeks of IV antibiotics followed by a second stage revision. Hopefully we can just reestablish the soft tissue tension by adjusting the humeral and glenoid components. I discussed the complexity of the operative procedure. I will proceed urgently tomorrow.     5 this is an acute illness that poses a threat to life or bodily function      T84.028  Mason Gonzalez MD

## 2021-07-01 ENCOUNTER — APPOINTMENT (OUTPATIENT)
Dept: GENERAL RADIOLOGY | Age: 71
End: 2021-07-01
Attending: PHYSICIAN ASSISTANT
Payer: MEDICARE

## 2021-07-01 ENCOUNTER — ANESTHESIA (OUTPATIENT)
Dept: SURGERY | Age: 71
End: 2021-07-01
Payer: MEDICARE

## 2021-07-01 ENCOUNTER — HOSPITAL ENCOUNTER (OUTPATIENT)
Age: 71
Setting detail: OBSERVATION
Discharge: HOME HEALTH | End: 2021-07-03
Attending: ORTHOPAEDIC SURGERY | Admitting: ORTHOPAEDIC SURGERY
Payer: MEDICARE

## 2021-07-01 DIAGNOSIS — Z96.619 DISLOCATION OF PROSTHETIC JOINT OF SHOULDER, SUBSEQUENT ENCOUNTER: ICD-10-CM

## 2021-07-01 DIAGNOSIS — T84.028D DISLOCATION OF PROSTHETIC JOINT OF SHOULDER, SUBSEQUENT ENCOUNTER: ICD-10-CM

## 2021-07-01 PROBLEM — T84.028A DISLOCATION OF PROSTHETIC SHOULDER JOINT (HCC): Status: ACTIVE | Noted: 2021-07-01

## 2021-07-01 LAB
CRP SERPL-MCNC: 0.3 MG/DL (ref 0–0.9)
ERYTHROCYTE [SEDIMENTATION RATE] IN BLOOD: 10 MM/HR (ref 0–20)
EST. AVERAGE GLUCOSE BLD GHB EST-MCNC: 105 MG/DL
GLUCOSE BLD STRIP.AUTO-MCNC: 92 MG/DL (ref 65–100)
HBA1C MFR BLD: 5.3 % (ref 4.2–6.3)
HISTORY CHECKED?,CKHIST: NORMAL
SERVICE CMNT-IMP: NORMAL

## 2021-07-01 PROCEDURE — 99218 HC RM OBSERVATION: CPT

## 2021-07-01 PROCEDURE — 74011250636 HC RX REV CODE- 250/636: Performed by: PHYSICIAN ASSISTANT

## 2021-07-01 PROCEDURE — 77030020268 HC MISC GENERAL SUPPLY: Performed by: ORTHOPAEDIC SURGERY

## 2021-07-01 PROCEDURE — 76210000006 HC OR PH I REC 0.5 TO 1 HR: Performed by: ORTHOPAEDIC SURGERY

## 2021-07-01 PROCEDURE — 77030002937 HC SUT MERS J&J -B: Performed by: ORTHOPAEDIC SURGERY

## 2021-07-01 PROCEDURE — 74011250636 HC RX REV CODE- 250/636: Performed by: ORTHOPAEDIC SURGERY

## 2021-07-01 PROCEDURE — 87205 SMEAR GRAM STAIN: CPT

## 2021-07-01 PROCEDURE — 77030040361 HC SLV COMPR DVT MDII -B: Performed by: ORTHOPAEDIC SURGERY

## 2021-07-01 PROCEDURE — 23474 REVIS RECONST SHOULDER JOINT: CPT | Performed by: PHYSICIAN ASSISTANT

## 2021-07-01 PROCEDURE — 94762 N-INVAS EAR/PLS OXIMTRY CONT: CPT

## 2021-07-01 PROCEDURE — 86901 BLOOD TYPING SEROLOGIC RH(D): CPT

## 2021-07-01 PROCEDURE — 77030021668 HC NEB PREFIL KT VYRM -A

## 2021-07-01 PROCEDURE — 77030039425 HC BLD LARYNG TRULITE DISP TELE -A: Performed by: ANESTHESIOLOGY

## 2021-07-01 PROCEDURE — 77010033711 HC HIGH FLOW OXYGEN

## 2021-07-01 PROCEDURE — 77030012793 HC CIRC VNTLTR FISP -B

## 2021-07-01 PROCEDURE — 77030011283 HC ELECTRD NDL COVD -A: Performed by: ORTHOPAEDIC SURGERY

## 2021-07-01 PROCEDURE — 77030012547: Performed by: ORTHOPAEDIC SURGERY

## 2021-07-01 PROCEDURE — 77030002986 HC SUT PROL J&J -A: Performed by: ORTHOPAEDIC SURGERY

## 2021-07-01 PROCEDURE — 2709999900 HC NON-CHARGEABLE SUPPLY: Performed by: ORTHOPAEDIC SURGERY

## 2021-07-01 PROCEDURE — 86923 COMPATIBILITY TEST ELECTRIC: CPT

## 2021-07-01 PROCEDURE — 77030021370 HC WRP CLD THER ICMN DJOR -B: Performed by: ORTHOPAEDIC SURGERY

## 2021-07-01 PROCEDURE — 86140 C-REACTIVE PROTEIN: CPT

## 2021-07-01 PROCEDURE — 77030000032 HC CUF TRNQT ZIMM -B: Performed by: ORTHOPAEDIC SURGERY

## 2021-07-01 PROCEDURE — 87075 CULTR BACTERIA EXCEPT BLOOD: CPT

## 2021-07-01 PROCEDURE — 87076 CULTURE ANAEROBE IDENT EACH: CPT

## 2021-07-01 PROCEDURE — 2709999900 HC NON-CHARGEABLE SUPPLY

## 2021-07-01 PROCEDURE — 77030008496 HC TBNG ARTHSC IRR S&N -B: Performed by: ORTHOPAEDIC SURGERY

## 2021-07-01 PROCEDURE — 76010000162 HC OR TIME 1.5 TO 2 HR INTENSV-TIER 1: Performed by: ORTHOPAEDIC SURGERY

## 2021-07-01 PROCEDURE — C1776 JOINT DEVICE (IMPLANTABLE): HCPCS | Performed by: ORTHOPAEDIC SURGERY

## 2021-07-01 PROCEDURE — 76010010054 HC POST OP PAIN BLOCK: Performed by: ORTHOPAEDIC SURGERY

## 2021-07-01 PROCEDURE — 74011250637 HC RX REV CODE- 250/637: Performed by: ANESTHESIOLOGY

## 2021-07-01 PROCEDURE — 74011250636 HC RX REV CODE- 250/636: Performed by: ANESTHESIOLOGY

## 2021-07-01 PROCEDURE — 77030040922 HC BLNKT HYPOTHRM STRY -A: Performed by: ANESTHESIOLOGY

## 2021-07-01 PROCEDURE — 87070 CULTURE OTHR SPECIMN AEROBIC: CPT

## 2021-07-01 PROCEDURE — L1830 KO IMMOB CANVAS LONG PRE OTS: HCPCS | Performed by: ORTHOPAEDIC SURGERY

## 2021-07-01 PROCEDURE — 76060000034 HC ANESTHESIA 1.5 TO 2 HR: Performed by: ORTHOPAEDIC SURGERY

## 2021-07-01 PROCEDURE — 85652 RBC SED RATE AUTOMATED: CPT

## 2021-07-01 PROCEDURE — C1713 ANCHOR/SCREW BN/BN,TIS/BN: HCPCS | Performed by: ORTHOPAEDIC SURGERY

## 2021-07-01 PROCEDURE — 74011000250 HC RX REV CODE- 250

## 2021-07-01 PROCEDURE — 77030034849: Performed by: ORTHOPAEDIC SURGERY

## 2021-07-01 PROCEDURE — 77030037088 HC TUBE ENDOTRACH ORAL NSL COVD-A: Performed by: ANESTHESIOLOGY

## 2021-07-01 PROCEDURE — 77030019908 HC STETH ESOPH SIMS -A: Performed by: ANESTHESIOLOGY

## 2021-07-01 PROCEDURE — 77030035643 HC BLD SAW OSC PRECIS STRY -C: Performed by: ORTHOPAEDIC SURGERY

## 2021-07-01 PROCEDURE — 87185 SC STD ENZYME DETCJ PER NZM: CPT

## 2021-07-01 PROCEDURE — 82962 GLUCOSE BLOOD TEST: CPT

## 2021-07-01 PROCEDURE — 83036 HEMOGLOBIN GLYCOSYLATED A1C: CPT

## 2021-07-01 PROCEDURE — 77030003602 HC NDL NRV BLK BBMI -B: Performed by: ANESTHESIOLOGY

## 2021-07-01 PROCEDURE — 77030031139 HC SUT VCRL2 J&J -A: Performed by: ORTHOPAEDIC SURGERY

## 2021-07-01 PROCEDURE — 77030018986 HC SUT ETHBND4 J&J -B: Performed by: ORTHOPAEDIC SURGERY

## 2021-07-01 PROCEDURE — A4565 SLINGS: HCPCS | Performed by: ORTHOPAEDIC SURGERY

## 2021-07-01 PROCEDURE — 23474 REVIS RECONST SHOULDER JOINT: CPT | Performed by: ORTHOPAEDIC SURGERY

## 2021-07-01 PROCEDURE — 87186 SC STD MICRODIL/AGAR DIL: CPT

## 2021-07-01 PROCEDURE — 76942 ECHO GUIDE FOR BIOPSY: CPT | Performed by: ORTHOPAEDIC SURGERY

## 2021-07-01 PROCEDURE — 77030006786 HC BLD SAW OSC S&N -B: Performed by: ORTHOPAEDIC SURGERY

## 2021-07-01 PROCEDURE — 77030003827 HC BIT DRL J&J -B: Performed by: ORTHOPAEDIC SURGERY

## 2021-07-01 PROCEDURE — 74011250636 HC RX REV CODE- 250/636

## 2021-07-01 PROCEDURE — 74011000250 HC RX REV CODE- 250: Performed by: ANESTHESIOLOGY

## 2021-07-01 PROCEDURE — 73030 X-RAY EXAM OF SHOULDER: CPT

## 2021-07-01 PROCEDURE — 77030004434 HC BUR RND STRY -B: Performed by: ORTHOPAEDIC SURGERY

## 2021-07-01 PROCEDURE — 77030002913 HC SUT ETHBND J&J -B: Performed by: ORTHOPAEDIC SURGERY

## 2021-07-01 DEVICE — CUP HUM DIA38MM +6MM OFFSET STD SHLDR POLYETH DELT XTEND
Type: IMPLANTABLE DEVICE | Site: SHOULDER | Status: NON-FUNCTIONAL
Removed: 2021-09-09

## 2021-07-01 DEVICE — SPACER HUM +9MM OFFSET SHLDR POLYETH FOR DELT XTEND REV SYS: Type: IMPLANTABLE DEVICE | Site: SHOULDER | Status: FUNCTIONAL

## 2021-07-01 DEVICE — COMPONENT GLENOSPHERE ECCENTRIC XTEND 38MM PLUS 8MM
Type: IMPLANTABLE DEVICE | Site: SHOULDER | Status: NON-FUNCTIONAL
Removed: 2021-09-09

## 2021-07-01 RX ORDER — HYDROMORPHONE HYDROCHLORIDE 2 MG/ML
0.5 INJECTION, SOLUTION INTRAMUSCULAR; INTRAVENOUS; SUBCUTANEOUS
Status: DISCONTINUED | OUTPATIENT
Start: 2021-07-01 | End: 2021-07-01 | Stop reason: HOSPADM

## 2021-07-01 RX ORDER — BUPIVACAINE HYDROCHLORIDE AND EPINEPHRINE 5; 5 MG/ML; UG/ML
INJECTION, SOLUTION EPIDURAL; INTRACAUDAL; PERINEURAL
Status: COMPLETED | OUTPATIENT
Start: 2021-07-01 | End: 2021-07-01

## 2021-07-01 RX ORDER — LIDOCAINE HYDROCHLORIDE 10 MG/ML
0.1 INJECTION INFILTRATION; PERINEURAL AS NEEDED
Status: DISCONTINUED | OUTPATIENT
Start: 2021-07-01 | End: 2021-07-01 | Stop reason: HOSPADM

## 2021-07-01 RX ORDER — LIDOCAINE HYDROCHLORIDE 20 MG/ML
INJECTION, SOLUTION EPIDURAL; INFILTRATION; INTRACAUDAL; PERINEURAL AS NEEDED
Status: DISCONTINUED | OUTPATIENT
Start: 2021-07-01 | End: 2021-07-01 | Stop reason: HOSPADM

## 2021-07-01 RX ORDER — NALOXONE HYDROCHLORIDE 0.4 MG/ML
0.1 INJECTION, SOLUTION INTRAMUSCULAR; INTRAVENOUS; SUBCUTANEOUS AS NEEDED
Status: DISCONTINUED | OUTPATIENT
Start: 2021-07-01 | End: 2021-07-01 | Stop reason: HOSPADM

## 2021-07-01 RX ORDER — FENTANYL CITRATE 50 UG/ML
100 INJECTION, SOLUTION INTRAMUSCULAR; INTRAVENOUS ONCE
Status: DISCONTINUED | OUTPATIENT
Start: 2021-07-01 | End: 2021-07-01 | Stop reason: HOSPADM

## 2021-07-01 RX ORDER — DOCUSATE SODIUM 100 MG/1
100 CAPSULE, LIQUID FILLED ORAL DAILY
Status: DISCONTINUED | OUTPATIENT
Start: 2021-07-02 | End: 2021-07-03 | Stop reason: HOSPADM

## 2021-07-01 RX ORDER — SODIUM CHLORIDE 0.9 % (FLUSH) 0.9 %
5-40 SYRINGE (ML) INJECTION AS NEEDED
Status: DISCONTINUED | OUTPATIENT
Start: 2021-07-01 | End: 2021-07-03 | Stop reason: HOSPADM

## 2021-07-01 RX ORDER — ACETAMINOPHEN 325 MG/1
650 TABLET ORAL
Status: DISCONTINUED | OUTPATIENT
Start: 2021-07-01 | End: 2021-07-03 | Stop reason: HOSPADM

## 2021-07-01 RX ORDER — HYDROMORPHONE HYDROCHLORIDE 1 MG/ML
1 INJECTION, SOLUTION INTRAMUSCULAR; INTRAVENOUS; SUBCUTANEOUS
Status: DISCONTINUED | OUTPATIENT
Start: 2021-07-01 | End: 2021-07-03 | Stop reason: HOSPADM

## 2021-07-01 RX ORDER — ONDANSETRON 2 MG/ML
4 INJECTION INTRAMUSCULAR; INTRAVENOUS ONCE
Status: DISCONTINUED | OUTPATIENT
Start: 2021-07-01 | End: 2021-07-01 | Stop reason: HOSPADM

## 2021-07-01 RX ORDER — SODIUM CHLORIDE 0.9 % (FLUSH) 0.9 %
5-40 SYRINGE (ML) INJECTION EVERY 8 HOURS
Status: DISCONTINUED | OUTPATIENT
Start: 2021-07-01 | End: 2021-07-03 | Stop reason: HOSPADM

## 2021-07-01 RX ORDER — BUPIVACAINE HYDROCHLORIDE 2.5 MG/ML
INJECTION, SOLUTION EPIDURAL; INFILTRATION; INTRACAUDAL
Status: COMPLETED | OUTPATIENT
Start: 2021-07-01 | End: 2021-07-01

## 2021-07-01 RX ORDER — OXYCODONE HYDROCHLORIDE 5 MG/1
10 TABLET ORAL
Status: DISCONTINUED | OUTPATIENT
Start: 2021-07-01 | End: 2021-07-01 | Stop reason: HOSPADM

## 2021-07-01 RX ORDER — LANOLIN ALCOHOL/MO/W.PET/CERES
1 CREAM (GRAM) TOPICAL
Status: DISCONTINUED | OUTPATIENT
Start: 2021-07-02 | End: 2021-07-03 | Stop reason: HOSPADM

## 2021-07-01 RX ORDER — FENTANYL CITRATE 50 UG/ML
INJECTION, SOLUTION INTRAMUSCULAR; INTRAVENOUS AS NEEDED
Status: DISCONTINUED | OUTPATIENT
Start: 2021-07-01 | End: 2021-07-01 | Stop reason: HOSPADM

## 2021-07-01 RX ORDER — HYDROMORPHONE HYDROCHLORIDE 2 MG/1
2 TABLET ORAL
Status: DISCONTINUED | OUTPATIENT
Start: 2021-07-01 | End: 2021-07-03 | Stop reason: HOSPADM

## 2021-07-01 RX ORDER — DIPHENHYDRAMINE HYDROCHLORIDE 50 MG/ML
12.5 INJECTION, SOLUTION INTRAMUSCULAR; INTRAVENOUS ONCE
Status: DISCONTINUED | OUTPATIENT
Start: 2021-07-01 | End: 2021-07-01 | Stop reason: HOSPADM

## 2021-07-01 RX ORDER — FACIAL-BODY WIPES
10 EACH TOPICAL DAILY PRN
Status: DISCONTINUED | OUTPATIENT
Start: 2021-07-01 | End: 2021-07-03 | Stop reason: HOSPADM

## 2021-07-01 RX ORDER — SODIUM CHLORIDE 9 MG/ML
250 INJECTION, SOLUTION INTRAVENOUS AS NEEDED
Status: DISCONTINUED | OUTPATIENT
Start: 2021-07-01 | End: 2021-07-03 | Stop reason: HOSPADM

## 2021-07-01 RX ORDER — PROMETHAZINE HYDROCHLORIDE 25 MG/1
25 TABLET ORAL
Status: DISCONTINUED | OUTPATIENT
Start: 2021-07-01 | End: 2021-07-03 | Stop reason: HOSPADM

## 2021-07-01 RX ORDER — ACETAMINOPHEN 500 MG
1000 TABLET ORAL ONCE
Status: COMPLETED | OUTPATIENT
Start: 2021-07-01 | End: 2021-07-01

## 2021-07-01 RX ORDER — CEFAZOLIN SODIUM/WATER 2 G/20 ML
2 SYRINGE (ML) INTRAVENOUS EVERY 8 HOURS
Status: CANCELLED | OUTPATIENT
Start: 2021-07-01 | End: 2021-07-02

## 2021-07-01 RX ORDER — MIDAZOLAM HYDROCHLORIDE 1 MG/ML
2 INJECTION, SOLUTION INTRAMUSCULAR; INTRAVENOUS ONCE
Status: DISCONTINUED | OUTPATIENT
Start: 2021-07-01 | End: 2021-07-01 | Stop reason: HOSPADM

## 2021-07-01 RX ORDER — ROCURONIUM BROMIDE 10 MG/ML
INJECTION, SOLUTION INTRAVENOUS AS NEEDED
Status: DISCONTINUED | OUTPATIENT
Start: 2021-07-01 | End: 2021-07-01 | Stop reason: HOSPADM

## 2021-07-01 RX ORDER — SODIUM CHLORIDE 9 MG/ML
75 INJECTION, SOLUTION INTRAVENOUS CONTINUOUS
Status: DISPENSED | OUTPATIENT
Start: 2021-07-01 | End: 2021-07-02

## 2021-07-01 RX ORDER — GLYCOPYRROLATE 0.2 MG/ML
INJECTION INTRAMUSCULAR; INTRAVENOUS AS NEEDED
Status: DISCONTINUED | OUTPATIENT
Start: 2021-07-01 | End: 2021-07-01 | Stop reason: HOSPADM

## 2021-07-01 RX ORDER — SODIUM CHLORIDE, SODIUM LACTATE, POTASSIUM CHLORIDE, CALCIUM CHLORIDE 600; 310; 30; 20 MG/100ML; MG/100ML; MG/100ML; MG/100ML
75 INJECTION, SOLUTION INTRAVENOUS CONTINUOUS
Status: DISCONTINUED | OUTPATIENT
Start: 2021-07-01 | End: 2021-07-01 | Stop reason: HOSPADM

## 2021-07-01 RX ORDER — OXYCODONE HYDROCHLORIDE 5 MG/1
5 TABLET ORAL
Status: DISCONTINUED | OUTPATIENT
Start: 2021-07-01 | End: 2021-07-01 | Stop reason: HOSPADM

## 2021-07-01 RX ORDER — NEOSTIGMINE METHYLSULFATE 1 MG/ML
INJECTION, SOLUTION INTRAVENOUS AS NEEDED
Status: DISCONTINUED | OUTPATIENT
Start: 2021-07-01 | End: 2021-07-01 | Stop reason: HOSPADM

## 2021-07-01 RX ORDER — MIDAZOLAM HYDROCHLORIDE 1 MG/ML
2 INJECTION, SOLUTION INTRAMUSCULAR; INTRAVENOUS
Status: COMPLETED | OUTPATIENT
Start: 2021-07-01 | End: 2021-07-01

## 2021-07-01 RX ORDER — SODIUM CHLORIDE, SODIUM LACTATE, POTASSIUM CHLORIDE, CALCIUM CHLORIDE 600; 310; 30; 20 MG/100ML; MG/100ML; MG/100ML; MG/100ML
100 INJECTION, SOLUTION INTRAVENOUS CONTINUOUS
Status: DISCONTINUED | OUTPATIENT
Start: 2021-07-01 | End: 2021-07-01 | Stop reason: HOSPADM

## 2021-07-01 RX ORDER — HYDROMORPHONE HYDROCHLORIDE 2 MG/1
4 TABLET ORAL
Status: DISCONTINUED | OUTPATIENT
Start: 2021-07-01 | End: 2021-07-03 | Stop reason: HOSPADM

## 2021-07-01 RX ADMIN — ACETAMINOPHEN 1000 MG: 500 TABLET, FILM COATED ORAL at 11:18

## 2021-07-01 RX ADMIN — FENTANYL CITRATE 100 MCG: 50 INJECTION INTRAMUSCULAR; INTRAVENOUS at 15:27

## 2021-07-01 RX ADMIN — HYDROMORPHONE HYDROCHLORIDE 1 MG: 1 INJECTION, SOLUTION INTRAMUSCULAR; INTRAVENOUS; SUBCUTANEOUS at 22:33

## 2021-07-01 RX ADMIN — BUPIVACAINE HYDROCHLORIDE 15 ML: 2.5 INJECTION, SOLUTION EPIDURAL; INFILTRATION; INTRACAUDAL; PERINEURAL at 14:38

## 2021-07-01 RX ADMIN — ROCURONIUM BROMIDE 50 MG: 10 INJECTION, SOLUTION INTRAVENOUS at 15:27

## 2021-07-01 RX ADMIN — LIDOCAINE HYDROCHLORIDE 40 MG: 20 INJECTION, SOLUTION EPIDURAL; INFILTRATION; INTRACAUDAL; PERINEURAL at 15:27

## 2021-07-01 RX ADMIN — BUPIVACAINE HYDROCHLORIDE AND EPINEPHRINE BITARTRATE 15 ML: 5; .005 INJECTION, SOLUTION EPIDURAL; INTRACAUDAL; PERINEURAL at 14:38

## 2021-07-01 RX ADMIN — Medication 3 MG: at 16:35

## 2021-07-01 RX ADMIN — MIDAZOLAM 2 MG: 1 INJECTION INTRAMUSCULAR; INTRAVENOUS at 14:36

## 2021-07-01 RX ADMIN — SODIUM CHLORIDE, SODIUM LACTATE, POTASSIUM CHLORIDE, AND CALCIUM CHLORIDE 100 ML/HR: 600; 310; 30; 20 INJECTION, SOLUTION INTRAVENOUS at 11:28

## 2021-07-01 RX ADMIN — Medication 10 ML: at 21:24

## 2021-07-01 RX ADMIN — VANCOMYCIN HYDROCHLORIDE 1000 MG: 1 INJECTION, POWDER, LYOPHILIZED, FOR SOLUTION INTRAVENOUS at 16:05

## 2021-07-01 RX ADMIN — GLYCOPYRROLATE 0.4 MG: 0.2 INJECTION, SOLUTION INTRAMUSCULAR; INTRAVENOUS at 16:35

## 2021-07-01 NOTE — H&P
Date of Surgery Update:  Gosia Andrade was seen and examined. History and physical has been reviewed. The patient has been examined.  There have been no significant clinical changes since the completion of the originally dated History and Physical.    Signed By: David Monsivais MD     July 1, 2021 11:11 AM

## 2021-07-01 NOTE — ANESTHESIA PREPROCEDURE EVALUATION
Relevant Problems   NEUROLOGY   (+) Mild episode of recurrent major depressive disorder (HCC)      PERSONAL HX & FAMILY HX OF CANCER   (+) Malignant neoplasm of urinary bladder (HCC)   (+) Prostate cancer (HCC)       Anesthetic History   No history of anesthetic complications            Review of Systems / Medical History  Patient summary reviewed, nursing notes reviewed and pertinent labs reviewed    Pulmonary    COPD: mild      Smoker         Neuro/Psych         Psychiatric history     Cardiovascular    Hypertension              Exercise tolerance: >4 METS     GI/Hepatic/Renal     GERD: well controlled           Endo/Other        Arthritis and cancer (bladder cancer, prostate , thyroid s/p thyroidecomy)  Pertinent negatives: No hypothyroidism   Other Findings   Comments: Chronic back pain           Physical Exam    Airway  Mallampati: II  TM Distance: 4 - 6 cm  Neck ROM: normal range of motion   Mouth opening: Normal     Cardiovascular  Regular rate and rhythm,  S1 and S2 normal,  no murmur, click, rub, or gallop  Rhythm: regular  Rate: normal         Dental    Dentition: Poor dentition  Comments:  Only one tooth   Pulmonary  Breath sounds clear to auscultation               Abdominal         Other Findings            Anesthetic Plan    ASA: 3  Anesthesia type: general - supraclavicular block      Post-op pain plan if not by surgeon: peripheral nerve block single    Induction: Intravenous  Anesthetic plan and risks discussed with: Patient

## 2021-07-01 NOTE — PERIOP NOTES
Pt R pupil tear-dropped shaped and L pupil pinpoint without reaction in either eye. No signs of stroke with NIHSS performed.  MD Kellie Vargas called to bedside and stated \"if patient had change of vision or patient states vision isn't back to patient's normal by tomorrow morning, call ophthalmologist.\"

## 2021-07-01 NOTE — BRIEF OP NOTE
BRIEF OPERATIVE NOTE    Date of Procedure: 7/1/2021     Preoperative Diagnosis:  DISLOCATED REVERSE RIGHT TSA    Postoperative Diagnosis:  SAME    Procedure(s): REVISION REVERSE RIGHT TOTAL SHOULDER ARTHROPLASTY WITH DELTA EXTEND PROSTHESIS       * Marcelo Mulligan MD - Primary         Assistant Staff:  Evelena Goodell PA      Surgical Staff:  Circ-1: Johny Lima RN  Scrub Tech-1: Inell Boga  Scrub Tech-2: Buddy Callie  Scrub Tech-3: Martin SIMON  Event Time In   Incision Start 1550   Incision Close        Anesthesia:  GENERAL WITH INTERSCALENE BLOCK    Estimated Blood Loss: 50 cc. Specimens:   ID Type Source Tests Collected by Time Destination   1 : #1 -RIGHT SHOULDER  Wound  CULTURE, ANAEROBIC, CULTURE, WOUND W Marcelo Baptiste MD 7/1/2021 2391 Microbiology   2 : #2 RIGHT SHOULDER  Wound  CULTURE, ANAEROBIC, CULTURE, WOUND W GRAM STAIN Marcelo Mulligan MD 7/1/2021 9954 Microbiology        Complications: NONE    Implants:   Implant Name Type Inv.  Item Serial No.  Lot No. LRB No. Used Action   COMPONENT GLENOSPHERE ECCENTRIC XTEND 38MM PLUS 8MM - [id number]  COMPONENT GLENOSPHERE ECCENTRIC XTEND 38MM PLUS 8MM [id number] [facility] [employer]_ [id number] Right 1 Implanted   SPACER HUM +9MM OFFSET SHLDR POLYETH FOR DELT XTEND REV SYS - [id number]  SPACER HUM +9MM OFFSET SHLDR POLYETH FOR DELT XTEND REV SYS [id number] [facility] [id number] Right 1 Implanted   CUP HUM [id number] +6MM OFFSET STD Brain Pouch [id number]  CUP HUM [id number] +6MM OFFSET STD Brain Pouch [id number] [employer]_ [id number] Right 1 Implanted       Christina Medina MD

## 2021-07-01 NOTE — PROGRESS NOTES
Patient assessment complete as charted, patient oriented to room, call light, how to order meals, fall precautions and pain medications. Gauze wrap over right shoulder/ arm in sling, arm elevated on pillow with Ice to shoulder. Patient moving his finger and has  to right hand. Left wrist splint present. Patient came to floor with Damico catheter no order to remove yet and patient has Hx of prostate issues so it is left in place.

## 2021-07-01 NOTE — PERIOP NOTES
TRANSFER - OUT REPORT:    Verbal report given to Grace(name) on Ettie Carrel  being transferred to [facility](unit) for routine post - op       Report consisted of patients Situation, Background, Assessment and   Recommendations(SBAR). Information from the following report(s) SBAR, OR Summary and MAR was reviewed with the receiving nurse. Lines:   Peripheral IV 07/01/21 Anterior;Proximal;Right Forearm (Active)   Site Assessment Clean, dry, & intact 07/01/21 1127   Phlebitis Assessment 0 07/01/21 1127   Infiltration Assessment 0 07/01/21 1127   Dressing Status Clean, dry, & intact; New 07/01/21 1127   Dressing Type Tape;Transparent 07/01/21 1127   Hub Color/Line Status Green; Infusing 07/01/21 1127        Opportunity for questions and clarification was provided.       Patient transported with:

## 2021-07-01 NOTE — PROGRESS NOTES
TRANSFER - IN REPORT:    Verbal report received from Tangela Gann (name) on [facility] [place]  being received from PACU (unit) for routine progression of care      Report consisted of patients Situation, Background, Assessment and   Recommendations(SBAR). Information from the following report(s) SBAR, Kardex, Intake/Output, MAR and Recent Results was reviewed with the receiving nurse. Opportunity for questions and clarification was provided. Assessment will be completed upon patients arrival to unit and care assumed.

## 2021-07-01 NOTE — ANESTHESIA PROCEDURE NOTES
Peripheral Block    Start time: 7/1/2021 2:37 PM  End time: 7/1/2021 2:38 PM  Performed by: Adelfo Lopez MD  Authorized by: Adelfo Lopez MD       Pre-procedure: Indications: at surgeon's request and post-op pain management    Preanesthetic Checklist: patient identified, risks and benefits discussed, site marked, timeout performed, anesthesia consent given and patient being monitored    Timeout Time: 14:38 EDT          Block Type:   Block Type:   Interscalene  Laterality:  Right  Monitoring:  Standard ASA monitoring, continuous pulse ox, frequent vital sign checks, heart rate, oxygen and responsive to questions  Injection Technique:  Single shot  Procedures: ultrasound guided    Patient Position: supine  Prep: chlorhexidine    Location:  Interscalene  Needle Type:  Stimuplex  Needle Gauge:  21 G  Needle Localization:  Ultrasound guidance and anatomical landmarks  Medication Injected:  Bupivacaine-EPINEPHrine (PF)(SENSORCAINE) 0.5%-1:200,000 mg injection, 15 mL  bupivacaine 0.25%, 15 mL  Med Admin Time: 7/1/2021 2:38 PM    Assessment:  Number of attempts:  1  Injection Assessment:  Incremental injection every 5 mL, local visualized surrounding nerve on ultrasound, negative aspiration for blood, no paresthesia, no intravascular symptoms and ultrasound image on chart  Patient tolerance:  Patient tolerated the procedure well with no immediate complications

## 2021-07-01 NOTE — ANESTHESIA POSTPROCEDURE EVALUATION
Procedure(s):  RIGHT SHOULDER IRRIGATION AND DEBRIDEMENT  REVISION REVERSE RIGHT TOTAL SHOULDER ARTHROPLASTY WITH DELTA EXTEND PROSTHESIS.    general    Anesthesia Post Evaluation      Multimodal analgesia: multimodal analgesia used between 6 hours prior to anesthesia start to PACU discharge  Patient location during evaluation: PACU  Patient participation: complete - patient participated  Level of consciousness: awake and awake and alert  Pain management: adequate  Airway patency: patent  Anesthetic complications: no  Cardiovascular status: acceptable  Respiratory status: acceptable  Hydration status: acceptable  Post anesthesia nausea and vomiting:  controlled      INITIAL Post-op Vital signs:   Vitals Value Taken Time   /77 07/01/21 1733   Temp 36.4 °C (97.5 °F) 07/01/21 1703   Pulse 60 07/01/21 1733   Resp 16 07/01/21 1733   SpO2 97 % 07/01/21 1733

## 2021-07-01 NOTE — PROGRESS NOTES
07/01/21 1805   Oxygen Therapy   O2 Sat (%) 99 %   Pulse via Oximetry 59 beats per minute   O2 Device None (Room air)   O2 Flow Rate (L/min)   (RA)   Incentive Spirometry Treatment   Actual Volume (ml) 3000 ml   Patient achieved 3000 Ml/sec on IS. Patient encouraged to do every hour while awake-patient agreed and demonstrated. No shortness of breath or distress noted.

## 2021-07-02 ENCOUNTER — HOME HEALTH ADMISSION (OUTPATIENT)
Dept: HOME HEALTH SERVICES | Facility: HOME HEALTH | Age: 71
End: 2021-07-02
Payer: MEDICARE

## 2021-07-02 LAB
ALBUMIN SERPL-MCNC: 3.1 G/DL (ref 3.2–4.6)
ALBUMIN/GLOB SERPL: 0.9 {RATIO} (ref 1.2–3.5)
ALP SERPL-CCNC: 72 U/L (ref 50–136)
ALT SERPL-CCNC: 22 U/L (ref 12–65)
ANION GAP SERPL CALC-SCNC: 7 MMOL/L (ref 7–16)
ANION GAP SERPL CALC-SCNC: 9 MMOL/L (ref 7–16)
AST SERPL-CCNC: 12 U/L (ref 15–37)
BILIRUB SERPL-MCNC: 0.4 MG/DL (ref 0.2–1.1)
BUN SERPL-MCNC: 10 MG/DL (ref 8–23)
BUN SERPL-MCNC: 9 MG/DL (ref 8–23)
CALCIUM SERPL-MCNC: 8.7 MG/DL (ref 8.3–10.4)
CALCIUM SERPL-MCNC: 8.8 MG/DL (ref 8.3–10.4)
CHLORIDE SERPL-SCNC: 102 MMOL/L (ref 98–107)
CHLORIDE SERPL-SCNC: 104 MMOL/L (ref 98–107)
CO2 SERPL-SCNC: 24 MMOL/L (ref 21–32)
CO2 SERPL-SCNC: 24 MMOL/L (ref 21–32)
CREAT SERPL-MCNC: 0.75 MG/DL (ref 0.8–1.5)
CREAT SERPL-MCNC: 0.95 MG/DL (ref 0.8–1.5)
CRP SERPL-MCNC: 3.8 MG/DL (ref 0–0.9)
ERYTHROCYTE [DISTWIDTH] IN BLOOD BY AUTOMATED COUNT: 14.9 % (ref 11.9–14.6)
ERYTHROCYTE [DISTWIDTH] IN BLOOD BY AUTOMATED COUNT: 14.9 % (ref 11.9–14.6)
ERYTHROCYTE [SEDIMENTATION RATE] IN BLOOD: 19 MM/HR (ref 0–20)
EST. AVERAGE GLUCOSE BLD GHB EST-MCNC: 111 MG/DL
GLOBULIN SER CALC-MCNC: 3.3 G/DL (ref 2.3–3.5)
GLUCOSE SERPL-MCNC: 163 MG/DL (ref 65–100)
GLUCOSE SERPL-MCNC: 99 MG/DL (ref 65–100)
HBA1C MFR BLD: 5.5 % (ref 4.2–6.3)
HCT VFR BLD AUTO: 33.9 % (ref 41.1–50.3)
HCT VFR BLD AUTO: 35.5 % (ref 41.1–50.3)
HGB BLD-MCNC: 11.4 G/DL (ref 13.6–17.2)
HGB BLD-MCNC: 11.8 G/DL (ref 13.6–17.2)
INR PPP: 1
MAGNESIUM SERPL-MCNC: 1.9 MG/DL (ref 1.8–2.4)
MCH RBC QN AUTO: 29.3 PG (ref 26.1–32.9)
MCH RBC QN AUTO: 29.5 PG (ref 26.1–32.9)
MCHC RBC AUTO-ENTMCNC: 33.2 G/DL (ref 31.4–35)
MCHC RBC AUTO-ENTMCNC: 33.6 G/DL (ref 31.4–35)
MCV RBC AUTO: 87.6 FL (ref 79.6–97.8)
MCV RBC AUTO: 88.1 FL (ref 79.6–97.8)
NRBC # BLD: 0 K/UL (ref 0–0.2)
NRBC # BLD: 0 K/UL (ref 0–0.2)
PLATELET # BLD AUTO: 323 K/UL (ref 150–450)
PLATELET # BLD AUTO: 350 K/UL (ref 150–450)
PMV BLD AUTO: 8.3 FL (ref 9.4–12.3)
PMV BLD AUTO: 8.6 FL (ref 9.4–12.3)
POTASSIUM SERPL-SCNC: 3.7 MMOL/L (ref 3.5–5.1)
POTASSIUM SERPL-SCNC: 4 MMOL/L (ref 3.5–5.1)
PROT SERPL-MCNC: 6.4 G/DL (ref 6.3–8.2)
PROTHROMBIN TIME: 13.2 SEC (ref 12.5–14.7)
RBC # BLD AUTO: 3.87 M/UL (ref 4.23–5.6)
RBC # BLD AUTO: 4.03 M/UL (ref 4.23–5.6)
SODIUM SERPL-SCNC: 135 MMOL/L (ref 136–145)
SODIUM SERPL-SCNC: 135 MMOL/L (ref 136–145)
WBC # BLD AUTO: 10 K/UL (ref 4.3–11.1)
WBC # BLD AUTO: 11.8 K/UL (ref 4.3–11.1)

## 2021-07-02 PROCEDURE — 80053 COMPREHEN METABOLIC PANEL: CPT

## 2021-07-02 PROCEDURE — 2709999900 HC NON-CHARGEABLE SUPPLY

## 2021-07-02 PROCEDURE — 99218 HC RM OBSERVATION: CPT

## 2021-07-02 PROCEDURE — 94760 N-INVAS EAR/PLS OXIMETRY 1: CPT

## 2021-07-02 PROCEDURE — 74011250637 HC RX REV CODE- 250/637: Performed by: PHYSICIAN ASSISTANT

## 2021-07-02 PROCEDURE — 85610 PROTHROMBIN TIME: CPT

## 2021-07-02 PROCEDURE — 36415 COLL VENOUS BLD VENIPUNCTURE: CPT

## 2021-07-02 PROCEDURE — 74011250636 HC RX REV CODE- 250/636: Performed by: PHYSICIAN ASSISTANT

## 2021-07-02 PROCEDURE — 83036 HEMOGLOBIN GLYCOSYLATED A1C: CPT

## 2021-07-02 PROCEDURE — 85652 RBC SED RATE AUTOMATED: CPT

## 2021-07-02 PROCEDURE — 97530 THERAPEUTIC ACTIVITIES: CPT

## 2021-07-02 PROCEDURE — 97161 PT EVAL LOW COMPLEX 20 MIN: CPT

## 2021-07-02 PROCEDURE — 86140 C-REACTIVE PROTEIN: CPT

## 2021-07-02 PROCEDURE — 83735 ASSAY OF MAGNESIUM: CPT

## 2021-07-02 PROCEDURE — 97110 THERAPEUTIC EXERCISES: CPT

## 2021-07-02 PROCEDURE — 85027 COMPLETE CBC AUTOMATED: CPT

## 2021-07-02 RX ORDER — HYDROMORPHONE HYDROCHLORIDE 2 MG/1
2 TABLET ORAL
Qty: 40 TABLET | Refills: 0 | Status: SHIPPED | OUTPATIENT
Start: 2021-07-02 | End: 2021-07-09

## 2021-07-02 RX ORDER — SODIUM CHLORIDE 0.9 % (FLUSH) 0.9 %
5-40 SYRINGE (ML) INJECTION AS NEEDED
Status: DISCONTINUED | OUTPATIENT
Start: 2021-07-02 | End: 2021-07-03 | Stop reason: HOSPADM

## 2021-07-02 RX ORDER — SODIUM CHLORIDE 0.9 % (FLUSH) 0.9 %
5-40 SYRINGE (ML) INJECTION EVERY 8 HOURS
Status: DISCONTINUED | OUTPATIENT
Start: 2021-07-02 | End: 2021-07-03 | Stop reason: HOSPADM

## 2021-07-02 RX ORDER — PROMETHAZINE HYDROCHLORIDE 25 MG/1
25 TABLET ORAL
Qty: 20 TABLET | Refills: 0 | Status: SHIPPED | OUTPATIENT
Start: 2021-07-02 | End: 2021-07-07

## 2021-07-02 RX ADMIN — FERROUS SULFATE TAB 325 MG (65 MG ELEMENTAL FE) 325 MG: 325 (65 FE) TAB at 07:09

## 2021-07-02 RX ADMIN — HYDROMORPHONE HYDROCHLORIDE 1 MG: 1 INJECTION, SOLUTION INTRAMUSCULAR; INTRAVENOUS; SUBCUTANEOUS at 05:20

## 2021-07-02 RX ADMIN — HYDROMORPHONE HYDROCHLORIDE 1 MG: 1 INJECTION, SOLUTION INTRAMUSCULAR; INTRAVENOUS; SUBCUTANEOUS at 03:57

## 2021-07-02 RX ADMIN — VANCOMYCIN HYDROCHLORIDE 1000 MG: 1 INJECTION, POWDER, LYOPHILIZED, FOR SOLUTION INTRAVENOUS at 03:57

## 2021-07-02 RX ADMIN — Medication 10 ML: at 20:44

## 2021-07-02 RX ADMIN — Medication 10 ML: at 05:20

## 2021-07-02 RX ADMIN — HYDROMORPHONE HYDROCHLORIDE 4 MG: 2 TABLET ORAL at 14:53

## 2021-07-02 RX ADMIN — Medication 10 ML: at 15:00

## 2021-07-02 RX ADMIN — HYDROMORPHONE HYDROCHLORIDE 2 MG: 2 TABLET ORAL at 19:56

## 2021-07-02 RX ADMIN — HYDROMORPHONE HYDROCHLORIDE 2 MG: 2 TABLET ORAL at 23:09

## 2021-07-02 RX ADMIN — VANCOMYCIN HYDROCHLORIDE 1000 MG: 1 INJECTION, POWDER, LYOPHILIZED, FOR SOLUTION INTRAVENOUS at 14:49

## 2021-07-02 RX ADMIN — Medication 10 ML: at 08:00

## 2021-07-02 RX ADMIN — DOCUSATE SODIUM 100 MG: 100 CAPSULE, LIQUID FILLED ORAL at 08:00

## 2021-07-02 RX ADMIN — ACETAMINOPHEN 650 MG: 325 TABLET, FILM COATED ORAL at 23:09

## 2021-07-02 RX ADMIN — HYDROMORPHONE HYDROCHLORIDE 4 MG: 2 TABLET ORAL at 11:01

## 2021-07-02 RX ADMIN — HYDROMORPHONE HYDROCHLORIDE 4 MG: 2 TABLET ORAL at 07:09

## 2021-07-02 RX ADMIN — Medication 10 ML: at 08:01

## 2021-07-02 NOTE — PROGRESS NOTES
Care Management Interventions  PCP Verified by CM: Yes  Mode of Transport at Discharge: Self  Transition of Care Consult (CM Consult): [address]: Yes  Physical Therapy Consult: Yes  Current Support Network: Lives with Spouse (lives w/ ex-wife)  Confirm Follow Up Transport: Family  The Plan for Transition of Care is Related to the Following Treatment Goals : improve strength  The Patient and/or Patient Representative was Provided with a Choice of Provider and Agrees with the Discharge Plan?: Yes  Freedom of Choice List was Provided with Basic Dialogue that Supports the Patient's Individualized Plan of Care/Goals, Treatment Preferences and Shares the Quality Data Associated with the Providers?: Yes  Discharge Location  Discharge Placement: Home with home health    Order rec'd to arrange [address] PT is s/p reverse TSA. He currently lives with his ex-wife in [place] [facility]. Patient agreeable to [facility] and did not have a preference towards provider. Referral sent to [facility]. He is anticipating d/c tomorrow-Sat 7-3.

## 2021-07-02 NOTE — ADDENDUM NOTE
Addendum  created 07/02/21 0810 by Elaina Morales, CRNA    Flowsheet accepted, Intraprocedure Flowsheets edited

## 2021-07-02 NOTE — OP NOTES
[place]  OPERATIVE REPORT    Name:  Homar Monreal  MR#:  [id number]  :  1950  ACCOUNT #:  [de-identified]  DATE OF SERVICE:  2021    PREOPERATIVE DIAGNOSIS:  Dislocated reverse right total shoulder arthroplasty. POSTOPERATIVE DIAGNOSIS:  Dislocated reverse right total shoulder arthroplasty. PROCEDURE PERFORMED:  Revision reverse right total shoulder arthroplasty. OPERATIVE SURGEON:  Troy Jeffers MD    ASSISTANT:  Kiara Culp. Use of a first assistant was necessary due to the complexity of the operative procedure. First assistant was present during the entire procedure, aided in exposure and the revision procedure. ANESTHESIA:  General with interscalene block. COMPLICATIONS:  None. IMPLANTS:  Hardware utilized was a DePuy 38 eccentric +8 mm lateralized glenosphere, 38 +6 cup, +9 extender. ESTIMATED BLOOD LOSS:  50 mL. CPT CODE:  49613. ICD-10 CODE:  T84.028 and H68.233. INDICATIONS:  The patient is a 67-year-old gentleman, now over 3 months status post reverse right total shoulder arthroplasty. The patient had been seen in the office on at least three separate occasions postop with radiographs demonstrating the prosthesis in excellent position. The patient had a fall in which he sustained a displaced left distal radius fracture. On the date following the fall, the radiographs of his right shoulder demonstrated the shoulder to be reduced. The patient was taken to the operative suite on 2021, underwent an ORIF of his left distal radius fracture. As the patient was moving onto the bed, he assisted the movement. He abducted, extended, internally rotated and applied a force. He felt a pop with the onset of right shoulder pain. He woke up in the recovery room with a deformity. On Friday morning, he called our office. He was brought to the office. Radiographs demonstrated anterior dislocation of his prosthesis.   It was reduced by Dr. Elana Perdomo. He returned to the office yesterday with recurrent dislocation. He was brought back to the operative suite for operative revision. PROCEDURE:  Following identification of the patient, the patient was taken to the operative suite. Following administration of general anesthesia, interscalene block for postop pain control, no antibiotics, placement of a Damico catheter, the patient was very carefully positioned on the operative table in the beach-chair fashion. The right shoulder was obviously dislocated. The right shoulder was then prepped and draped in sterile fashion. His previous incision was marked. InteguSeal was applied. Once the InteguSeal was allowed to cure, the skin was incised. Subcutaneous tissue was then dissected down to the deltopectoral interval.  This was dissected proximally and distally. The prosthesis was noted to be dislocated anteriorly with interposed soft tissue. The prosthesis was then very carefully mobilized. The previous +9 extender and +3 cup was removed. The humeral stem was well fixed. The previous 38 +4 eccentric glenosphere was removed. The metaglene was tested and stable. The screws were stable. Multiple cultures were obtained, labeled one, two and three from the right shoulder. He then received weight-adjusted vancomycin. At this point, given the fact that there was no evident infection and there was a periprosthetic instability, the shoulder was then copiously irrigated. It was elected to utilize a 38 eccentric +8 lateralized glenosphere. This was inserted in the standard fashion and secured. The metaglene and glenosphere were stable. On the humeral side, a true +9 extender was placed and a 38 +6 poly was placed. The shoulder was reduced. There was excellent stability with excellent mobility. At this point, the wound was then irrigated. Deltopectoral interval was approximated using #5 Mersilene sutures.   Skin was closed with 0 Vicryl figure-of-eight sutures and a 2-0 Prolene subcuticular stitch. A sterile dressing was applied. A sling and swathe was applied. The patient was then transferred to the recovery room in stable condition.       Judi Beasley MD      [id number]  D:  07/01/2021 16:47  T:  07/02/2021 3:44  JOB #:  [id number]  CC:  Baljinder Dominguez MD       [address] [place] [address]

## 2021-07-02 NOTE — PROGRESS NOTES
07/01/21 20:00 -  Shift assessment complete. Patient is alert and oriented, in bed. Respirations are even and unlabored, bowel sounds are present. Right upper extremity is dressed, wrapped and resting in sling. Dressing is clean, dry and intact. Right arm is appropriate in color and warm to touch. Patient is able to  with moderate strength. Radial pulse is present. Sensation is returning. Patient reports tingling at this time. Will reassess neurovascular status. Patient has a splint on left lower arm for ongoing issue unrelated to surgery. Damico still in place, order did not specify removal time. Yellow urine free of sediment draining. Bed low and locked, call light within reach. Patient's wife is at bedside. No needs expressed at this time. ___________________________________________________________________  Problem: Falls - Risk of  Goal: *Absence of Falls  Description: Document Becca Fall Risk and appropriate interventions in the flowsheet.   Outcome: Progressing Towards Goal  Note: Fall Risk Interventions:  Mobility Interventions: Communicate number of staff needed for ambulation/transfer     Medication Interventions: Teach patient to arise slowly, Patient to call before getting OOB    Elimination Interventions: Call light in reach, Elevated toilet seat, Patient to call for help with toileting needs, Stay With Me (per policy), Toileting schedule/hourly rounds, Toilet paper/wipes in reach    Problem: Patient Education: Go to Patient Education Activity  Goal: Patient/Family Education  Outcome: Progressing Towards Goal     Problem: Upper Extremity Surgical Pathway: Day of Surgery  Goal: Psychosocial  Outcome: Progressing Towards Goal  Goal: *Optimal pain control at patient's stated goal  Outcome: Progressing Towards Goal

## 2021-07-02 NOTE — PROGRESS NOTES
Damico catheter removed. Sterile dressing changed. Sensation present, finger extension present in RUE. No needs expressed at this time. Bed locked, in lowest position, call light within reach.

## 2021-07-02 NOTE — PROGRESS NOTES
Orthopedic Joint Progress Note    2021  Admit Date: 2021  Admit Diagnosis: Dislocation of prosthetic joint of shoulder, initial encounter [facility]) [Z45.021Y, Z96.619]  Status post reverse arthroplasty of shoulder, right [Z96.611]  Dislocation of prosthetic shoulder joint ([address] [K58.054N, Z96.619]  Dislocation of prosthetic joint of shoulder ([facility] [T84.028A, Z96.619]    1 Day Post-Op    Subjective:     Lo Oconnell is doing well. C/o moderate pain today. Review of Systems: Pertinent items are noted in HPI. Objective:     PT/OT:     PATIENT MOBILITY                           Vital Signs:    Blood pressure 108/70, pulse 83, temperature 99 °F (37.2 °C), resp. rate 16, height 5' 7\" (1.702 m), weight 139 lb (63 kg), SpO2 93 %.   Temp (24hrs), Av.9 °F (36.6 °C), Min:97.3 °F (36.3 °C), Max:99 °F (37.2 °C)      Pain Control:   Pain Assessment  Pain Scale 1: Numeric (0 - 10)  Pain Intensity 1: 10  Pain Onset 1: post op  Pain Location 1: Shoulder  Pain Orientation 1: Right  Pain Description 1: Constant  Pain Intervention(s) 1: Medication (see MAR), Repositioned, Rest    Meds:  Current Facility-Administered Medications   Medication Dose Route Frequency    sodium chloride (NS) flush 5-40 mL  5-40 mL IntraVENous Q8H    sodium chloride (NS) flush 5-40 mL  5-40 mL IntraVENous PRN    sodium chloride (NS) flush 5-40 mL  5-40 mL IntraVENous Q8H    sodium chloride (NS) flush 5-40 mL  5-40 mL IntraVENous PRN    0.9% sodium chloride infusion 250 mL  250 mL IntraVENous PRN    0.9% sodium chloride infusion  75 mL/hr IntraVENous CONTINUOUS    sodium chloride (NS) flush 5-40 mL  5-40 mL IntraVENous Q8H    sodium chloride (NS) flush 5-40 mL  5-40 mL IntraVENous PRN    acetaminophen (TYLENOL) tablet 650 mg  650 mg Oral Q4H PRN    HYDROmorphone (DILAUDID) injection 1 mg  1 mg IntraVENous Q1H PRN    promethazine (PHENERGAN) tablet 25 mg  25 mg Oral Q6H PRN    bisacodyL (DULCOLAX) suppository 10 mg  10 mg Rectal DAILY PRN    docusate sodium (COLACE) capsule 100 mg  100 mg Oral DAILY    ferrous sulfate tablet 325 mg  1 Tablet Oral DAILY WITH BREAKFAST    HYDROmorphone (DILAUDID) tablet 2 mg  2 mg Oral Q4H PRN    HYDROmorphone (DILAUDID) tablet 4 mg  4 mg Oral Q4H PRN    vancomycin (VANCOCIN) 1,000 mg in 0.9% sodium chloride 250 mL (VIAL-MATE)  1,000 mg IntraVENous Q12H        LAB:    Lab Results   Component Value Date/Time    INR 1.0 06/30/2021 11:31 AM    INR 1.0 03/17/2021 03:23 PM     Lab Results   Component Value Date/Time    HGB 11.4 (L) 07/02/2021 04:16 AM    HGB 12.5 (L) 06/30/2021 11:31 AM    HGB 11.3 (L) 03/26/2021 04:40 AM       Incision 06/24/21 Arm Left (Active)   Number of days: 8       Incision 06/25/21 Shoulder Right (Active)   Number of days: 7       Incision 07/01/21 Shoulder Right (Active)   Dressing Status Clean;Dry; Intact 07/01/21 1655   Cleansed Cleansed with saline 07/01/21 1500   Dressing/Treatment ABD pad;Gauze dressing/dressing sponge; Other (Comment) 07/01/21 1655   Number of days: 1       [REMOVED] Incision 03/25/21 Shoulder Right (Removed)   Number of days: 14         Physical Exam:  No significant changes    Assessment:      Principal Problem:    Dislocation of prosthetic joint of shoulder ([address] (6/30/2021)    Active Problems:    S/P reverse total shoulder arthroplasty, right (6/30/2021)      Dislocation of prosthetic shoulder joint ([address] (7/1/2021)         Plan:     Continue PT/OT/Rehab  We will have his catheter removed today. Labs look good. Wound cultures sent. We will keep him tonight for observation and pain control with anticipated discharge tomorrow. Dr. Jeromy William sent prescriptions to pharmacy.      DUY Hernandes

## 2021-07-02 NOTE — PROGRESS NOTES
Problem: Mobility Impaired (Adult and Pediatric)  Goal: *Acute Goals and Plan of Care (Insert Text)  Outcome: Progressing Towards Goal  Note: GOALS (1-4 days):  (1.)  Patient will move from supine to sit and sit to supine  in bed with INDEPENDENT. (2.)  Patient will transfer from bed to chair and chair to bed with INDEPENDENT using the least restrictive device. (3.)  Patient will ambulate with INDEPENDENT for 650 feet with the least restrictive device. (4.)  Patient will be independent with shoulder HEP to increase range of motion per MD orders. ________________________________________________________________________________________________        PHYSICAL THERAPY: Daily Note and PM 7/2/2021  OBSERVATION: Hospital Day: 2  Payor: [facility] MEDICARE / Plan: [facility] MEDICARE HMO/PPO / Product Type: Managed Care Medicare /      NAME/AGE/GENDER: King Springer is a 69 y.o. male   PRIMARY DIAGNOSIS: Dislocation of prosthetic joint of shoulder, initial encounter ([facility] [T84.028A, Z96.619]  Status post reverse arthroplasty of shoulder, right [Z96.611]  Dislocation of prosthetic shoulder joint ([facility] [O51.205C, Z96.619]  Dislocation of prosthetic joint of shoulder ([facility] [T84.028A, Z96.619] Dislocation of prosthetic joint of shoulder (HCC) Dislocation of prosthetic joint of shoulder (HCC)  Procedure(s) (LRB):  RIGHT SHOULDER IRRIGATION AND DEBRIDEMENT (Right)  REVISION REVERSE RIGHT TOTAL SHOULDER ARTHROPLASTY WITH DELTA EXTEND PROSTHESIS (Right)  1 Day Post-Op  ICD-10: Treatment Diagnosis:   · Pain in Right Shoulder (M25.511)  · Stiffness of Right Shoulder, Not elsewhere classified (M25.611)  · Other abnormalities of gait and mobility (R26.89)   Precaution/Allergies:  Patient has no known allergies. ASSESSMENT:     Mr. Miguel Park presents with limited ROM and strength following his revision of right reverse TSA.  He was a little limited with functional mobility but able to ambulate in the halls with PT and then perform bedside therex. He was left up in the bathroom following therapy. Hurting but ice is helping. 7/2 - just getting back into bed with family member. He says he is hurting a lot and that the nurse had just given him meds. Agreed to do some simple elbow, wrist and hand exercises. Ice applied after therex. Educated on ice use at home. Says he has an exercise program on paper already. This section established at most recent assessment   PROBLEM LIST (Impairments causing functional limitations):  1. Decreased [place] with Bed Mobility  2. Decreased [place] with Transfers  3. Decreased [place] with Ambulation   4. Decreased [place] with shoulder HEP   INTERVENTIONS PLANNED: (Benefits and precautions of physical therapy have been discussed with the patient.)  1. Bed Mobility Training  2. Transfer Training  3. Gait Training  4. Therapeutic Exercises per MD orders  5. Modalities for Pain     TREATMENT PLAN: Frequency/Duration: twice daily for duration of hospital stay  Rehabilitation Potential For Stated Goals: Excellent     RECOMMENDED REHABILITATION/EQUIPMENT: (at time of discharge pending progress): Continue Skilled Therapy and Home Health: Physical Therapy. HISTORY:   History of Present Injury/Illness (Reason for Referral): Admitted for revision of right reverse TSA. Past Medical History/Comorbidities:   Mr. Tyrell Lackey  has a past medical history of Allergic rhinitis, Arthritis, Broken arm (06/18/2021), Cancer ([facility] (2019), Chronic pain, COPD (chronic obstructive pulmonary disease) ([facility] (09/28/2016), Current every day smoker, Hypercholesterolemia, Hypertension, Hypothyroidism (9/28/2016), Long-term use of high-risk medication (8/26/2019), Marijuana user, Osteopenia (9/28/2016), Prostate cancer ([facility] (2016), and Urinary frequency.  He also has no past medical history of Difficult intubation, Malignant hyperthermia due to anesthesia, Nausea & vomiting, or Pseudocholinesterase deficiency. Mr. Steven Traore  has a past surgical history that includes hx partial thyroidectomy (Left); hx hernia repair; hx orthopaedic (Right, 2021); hx prostatectomy (2016); hx urological; hx orthopaedic (Left, 2021); and hx cataract removal (Bilateral). Social History/Living Environment:      Prior Level of Function/Work/Activity:  Independent prior to admit without assistive device   Number of Personal Factors/Comorbidities that affect the Plan of Care: 0: LOW COMPLEXITY   EXAMINATION:   Most Recent Physical Functioning:   Gross Assessment:  AROM: Generally decreased, functional (limited vasyl UE R > L)  Strength: Generally decreased, functional (limited R UE)               Posture:     Balance:  Sitting: Intact  Standing: Pull to stand; Without support Bed Mobility:  Rolling: Supervision  Supine to Sit: Supervision  Sit to Supine: Supervision  Scooting: Supervision  Wheelchair Mobility:     Transfers:  Sit to Stand: Stand-by assistance  Stand to Sit: Supervision  Bed to Chair: Supervision  Gait:     Distance (ft): 270 Feet (ft)  Assistive Device:  (none)  Ambulation - Level of Assistance: Stand-by assistance  Interventions: Verbal cues; Safety awareness training      Body Structures Involved:  1. Joints  2. Muscles Body Functions Affected:  1. Movement Related Activities and Participation Affected:  1. Mobility   Number of elements that affect the Plan of Care: 4+: HIGH COMPLEXITY   CLINICAL PRESENTATION:   Presentation: Stable and uncomplicated: LOW COMPLEXITY   CLINICAL DECISION MAKIN [place] [id number] AM-PAC 6 Clicks   Basic Mobility Inpatient Short Form  How much difficulty does the patient currently have. .. Unable A Lot A Little None   1. Turning over in bed (including adjusting bedclothes, sheets and blankets)? [] 1   [] 2   [x] 3   [] 4   2.   Sitting down on and standing up from a chair with arms ( e.g., wheelchair, bedside commode, etc.)   [] 1   [] 2   [x] 3   [] 4 3. Moving from lying on back to sitting on the side of the bed? [] 1   [] 2   [x] 3   [] 4   How much help from another person does the patient currently need. .. Total A Lot A Little None   4. Moving to and from a bed to a chair (including a wheelchair)? [] 1   [] 2   [x] 3   [] 4   5. Need to walk in hospital room? [] 1   [] 2   [x] 3   [] 4   6. Climbing 3-5 steps with a railing? [] 1   [] 2   [x] 3   [] 4   © 2007, Trustees of [facility] [employer], under license to [employer]. All rights reserved      Score:  Initial: 18 Most Recent: X (Date: -- )    Interpretation of Tool:  Represents activities that are increasingly more difficult (i.e. Bed mobility, Transfers, Gait). Medical Necessity:     · Patient is expected to demonstrate progress in   · strength, range of motion, and functional technique  ·  to   · increase independence with HEP and functional mobilktiy  · . Reason for Services/Other Comments:  · Patient continues to require skilled intervention due to   · Limited functional independence  · . Use of outcome tool(s) and clinical judgement create a POC that gives a: Clear prediction of patient's progress: LOW COMPLEXITY            TREATMENT:   (In addition to Assessment/Re-Assessment sessions the following treatments were rendered)   Pre-treatment Symptoms/Complaints:  painful shoulder  Pain: Initial:      Post Session:  3       Therapeutic Exercise: (15 Minutes):  Exercises per grid below to improve mobility and strength. Required minimal verbal cues to promote proper body alignment.      Date:  7/2 Date:   Date:     ACTIVITY/EXERCISE AM PM AM PM AM PM   Gripping 10 10       Wrist Flexion/Extension 10 10       Wrist Ulnar/Radial Deviation         Pronation/Supination 10 10       Elbow Flexion/Extension 10aa 10aa       Shoulder Flexion/Extension         Shoulder AB/ADduction         Shoulder IR/ER         Pulleys         Pendulums 10        Shrugs 10        Isometric: Flexion         Extension         ABduction         ADduction         Biceps/Triceps                  B = bilateral; AA = active assistive; A = active; P = passive  Education:  [x]  Home Exercises  [x]  Sling Application   [x]  Movement Precautions   []  Pulleys   [x]  Use of Ice   []  Other:   Treatment/Session Assessment:    · Response to Treatment:  Participated well with PT but hurting more this afternoon so not able to tolerate as much movement. · Interdisciplinary Collaboration:   o Physical Therapist  o Registered Nurse  · After treatment position/precautions:   o Supine in bed  o Bed/Chair-wheels locked  o Bed in low position  o Call light within reach  o RN notified  o Family at bedside   · Compliance with Program/Exercises: compliant all of the time. · Recommendations/Intent for next treatment session:  Treatment next visit will focus on increasing Mr. Mango Little independence with bed mobility, transfers, gait training, strength/ROM exercises, modalities for pain, and patient education.    Total Treatment Duration:  PT Patient Time In/Time Out  Time In: 0095  Time Out: [address] [facility], PT

## 2021-07-02 NOTE — PROGRESS NOTES
07/01/21 2144   Oxygen Therapy   O2 Sat (%) 99 %   Pulse via Oximetry 72 beats per minute   O2 Device Heated; Hi flow nasal cannula   Skin Assessment Clean, dry, & intact   O2 Flow Rate (L/min) 20 l/min   O2 Temperature 87.8 °F (31 °C)   FIO2 (%) 30 %   Pt on continuous monitor for HS. Alarm limits set. Pt working on IS. Pt on HFNC at 20L and 30% for tonight.

## 2021-07-02 NOTE — DISCHARGE SUMMARY
[facility] Discharge Summary      Patient ID:  Nena Bowman  [id number]  19 y.o.  1950    Allergies: Patient has no known allergies. Admit date: 7/1/2021    Discharge date and time: 7/3/2021     Admitting Physician: Lindsay Fernández MD     Discharge Physician: Lindsay Fernández MD      * Admission Diagnoses: Dislocation of prosthetic joint of shoulder, initial encounter [facility]) [M69.912X, [address]; Status post reverse arthroplasty of shoulder, right [Z96.611]; Dislocation of prosthetic shoulder joint ([facility] Juan Pabloglenn Moira, Z96.619]    * Discharge Diagnoses:   Hospital Problems as of 7/2/2021 Date Reviewed: 6/25/2021        Codes Class Noted - Resolved POA    Dislocation of prosthetic shoulder joint ([facility] ICD-10-CM: Z33.450T, A41.183  ICD-9-CM: 996.42, V43.61  7/1/2021 - Present Unknown        * (Principal) Dislocation of prosthetic joint of shoulder ([facility] ICD-10-CM: A24.834D, Z96.619  ICD-9-CM: 996.42, V43.61  6/30/2021 - Present Yes        S/P reverse total shoulder arthroplasty, right ICD-10-CM: F20.849  ICD-9-CM: V43.61  6/30/2021 - Present Yes              Surgeon: Lindsay Fernández MD          * Procedure: Procedure(s):  RIGHT SHOULDER IRRIGATION AND DEBRIDEMENT  REVISION REVERSE RIGHT TOTAL SHOULDER ARTHROPLASTY WITH DELTA EXTEND PROSTHESIS           Perioperative Antibiotics: Ancef  ___                                                Vancomycin  ___          Post Op complications: none        * Discharge Condition: good  Wound appears to be healing without any evidence of infection.          * Discharged to: Home    * Follow-up Care/Discharge instructions:  - Resume pre hospital diet            - Resume home medications per medical continuation form     CONTINUE PHYSICAL THERAPY  Sling right shoulder  - Follow up in office as scheduled       Signed:  Lindsay Fernández MD  7/2/2021  6:53 AM

## 2021-07-02 NOTE — PROGRESS NOTES
Problem: Mobility Impaired (Adult and Pediatric)  Goal: *Acute Goals and Plan of Care (Insert Text)  Outcome: Progressing Towards Goal  Note: GOALS (1-4 days):  (1.)  Patient will move from supine to sit and sit to supine  in bed with INDEPENDENT. (2.)  Patient will transfer from bed to chair and chair to bed with INDEPENDENT using the least restrictive device. (3.)  Patient will ambulate with INDEPENDENT for 650 feet with the least restrictive device. (4.)  Patient will be independent with shoulder HEP to increase range of motion per MD orders. ________________________________________________________________________________________________        PHYSICAL THERAPY: Initial Assessment and AM 7/2/2021  OBSERVATION: Hospital Day: 2  Payor: [facility] / [place] [facility] MEDICARE HMO/PPO / Product Type: Managed Care Medicare /      NAME/AGE/GENDER: Ruslan Fajardo is a 69 y.o. male   PRIMARY DIAGNOSIS: Dislocation of prosthetic joint of shoulder, initial encounter ([facility] [T84.028A, Z96.619]  Status post reverse arthroplasty of shoulder, right [Z96.611]  Dislocation of prosthetic shoulder joint ([facility] [G13.171L, Z96.619]  Dislocation of prosthetic joint of shoulder ([facility] [T84.028A, Z96.619] Dislocation of prosthetic joint of shoulder (HCC) Dislocation of prosthetic joint of shoulder (HCC)  Procedure(s) (LRB):  RIGHT SHOULDER IRRIGATION AND DEBRIDEMENT (Right)  REVISION REVERSE RIGHT TOTAL SHOULDER ARTHROPLASTY WITH DELTA EXTEND PROSTHESIS (Right)  1 Day Post-Op  ICD-10: Treatment Diagnosis:   · Pain in Right Shoulder (M25.511)  · Stiffness of Right Shoulder, Not elsewhere classified (M25.611)  · Other abnormalities of gait and mobility (R26.89)   Precaution/Allergies:  Patient has no known allergies. ASSESSMENT:     Mr. Gopi Wells presents with limited ROM and strength following his revision of right reverse TSA.  He was a little limited with functional mobility but able to ambulate in the halls with PT and then perform bedside therex. He was left up in the bathroom following therapy. Hurting but ice is helping. This section established at most recent assessment   PROBLEM LIST (Impairments causing functional limitations):  1. Decreased [place] with Bed Mobility  2. Decreased [place] with Transfers  3. Decreased [place] with Ambulation   4. Decreased [place] with shoulder HEP   INTERVENTIONS PLANNED: (Benefits and precautions of physical therapy have been discussed with the patient.)  1. Bed Mobility Training  2. Transfer Training  3. Gait Training  4. Therapeutic Exercises per MD orders  5. Modalities for Pain     TREATMENT PLAN: Frequency/Duration: twice daily for duration of hospital stay  Rehabilitation Potential For Stated Goals: Excellent     RECOMMENDED REHABILITATION/EQUIPMENT: (at time of discharge pending progress): Continue Skilled Therapy and Home Health: Physical Therapy. HISTORY:   History of Present Injury/Illness (Reason for Referral): Admitted for revision of right reverse TSA. Past Medical History/Comorbidities:   Mr. Zo Scales  has a past medical history of Allergic rhinitis, Arthritis, Broken arm (06/18/2021), Cancer ([facility] (2019), Chronic pain, COPD (chronic obstructive pulmonary disease) ([facility] (09/28/2016), Current every day smoker, Hypercholesterolemia, Hypertension, Hypothyroidism (9/28/2016), Long-term use of high-risk medication (8/26/2019), Marijuana user, Osteopenia (9/28/2016), Prostate cancer ([facility] (2016), and Urinary frequency. He also has no past medical history of Difficult intubation, Malignant hyperthermia due to anesthesia, Nausea & vomiting, or Pseudocholinesterase deficiency. Mr. Zo Scales  has a past surgical history that includes hx partial thyroidectomy (Left); hx hernia repair; hx orthopaedic (Right, 03/25/2021); hx prostatectomy (2016); hx urological; hx orthopaedic (Left, 06/24/2021); and hx cataract removal (Bilateral).   Social History/Living Environment:      Prior Level of Function/Work/Activity:  Independent prior to admit without assistive device   Number of Personal Factors/Comorbidities that affect the Plan of Care: 0: LOW COMPLEXITY   EXAMINATION:   Most Recent Physical Functioning:   Gross Assessment:  AROM: Generally decreased, functional (limited vasyl UE R > L)  Strength: Generally decreased, functional (limited R UE)               Posture:     Balance:  Sitting: Intact  Standing: Pull to stand; Without support Bed Mobility:  Rolling: Supervision  Supine to Sit: Supervision  Sit to Supine: Supervision  Scooting: Supervision  Wheelchair Mobility:     Transfers:  Sit to Stand: Stand-by assistance  Stand to Sit: Supervision  Bed to Chair: Supervision  Gait:     Distance (ft): 270 Feet (ft)  Assistive Device:  (none)  Ambulation - Level of Assistance: Stand-by assistance  Interventions: Verbal cues; Safety awareness training      Body Structures Involved:  1. Joints  2. Muscles Body Functions Affected:  1. Movement Related Activities and Participation Affected:  1. Mobility   Number of elements that affect the Plan of Care: 4+: HIGH COMPLEXITY   CLINICAL PRESENTATION:   Presentation: Stable and uncomplicated: LOW COMPLEXITY   CLINICAL DECISION MAKIN[address] [place] [address] AM-PAC 6 Clicks   Basic Mobility Inpatient Short Form  How much difficulty does the patient currently have. .. Unable A Lot A Little None   1. Turning over in bed (including adjusting bedclothes, sheets and blankets)? [] 1   [] 2   [x] 3   [] 4   2. Sitting down on and standing up from a chair with arms ( e.g., wheelchair, bedside commode, etc.)   [] 1   [] 2   [x] 3   [] 4   3. Moving from lying on back to sitting on the side of the bed? [] 1   [] 2   [x] 3   [] 4   How much help from another person does the patient currently need. .. Total A Lot A Little None   4. Moving to and from a bed to a chair (including a wheelchair)? [] 1   [] 2   [x] 3   [] 4   5. Need to walk in hospital room? [] 1   [] 2   [x] 3   [] 4   6. Climbing 3-5 steps with a railing? [] 1   [] 2   [x] 3   [] 4   © 2007, Trustees of [facility] [employer], under license to [employer]. All rights reserved      Score:  Initial: 18 Most Recent: X (Date: -- )    Interpretation of Tool:  Represents activities that are increasingly more difficult (i.e. Bed mobility, Transfers, Gait). Medical Necessity:     · Patient is expected to demonstrate progress in   · strength, range of motion, and functional technique  ·  to   · increase independence with HEP and functional mobilktiy  · . Reason for Services/Other Comments:  · Patient continues to require skilled intervention due to   · Limited functional independence  · . Use of outcome tool(s) and clinical judgement create a POC that gives a: Clear prediction of patient's progress: LOW COMPLEXITY            TREATMENT:   (In addition to Assessment/Re-Assessment sessions the following treatments were rendered)   Pre-treatment Symptoms/Complaints:  painful shoulder  Pain: Initial:      Post Session:  3     Therapeutic Activity: (    25 minutes): Therapeutic activities including Bed transfers and Ambulation on level ground to improve mobility and strength. Required minimal Verbal cues; Safety awareness training to promote dynamic balance in standing. Therapeutic Exercise: (15 Minutes):  Exercises per grid below to improve mobility and strength. Required minimal verbal cues to promote proper body alignment.      Date:  7/2 Date:   Date:     ACTIVITY/EXERCISE AM PM AM PM AM PM   Gripping 10        Wrist Flexion/Extension 10        Wrist Ulnar/Radial Deviation         Pronation/Supination 10        Elbow Flexion/Extension 10aa        Shoulder Flexion/Extension         Shoulder AB/ADduction         Shoulder IR/ER         Pulleys         Pendulums 10        Shrugs 10        Isometric:                 Flexion         Extension         ABduction ADduction         Biceps/Triceps                  B = bilateral; AA = active assistive; A = active; P = passive  Education:  [x]  Home Exercises  [x]  Sling Application   [x]  Movement Precautions   []  Pulleys   [x]  Use of Ice   []  Other:   Treatment/Session Assessment:    · Response to Treatment:  tolerated therapy well. Pleasant and talkative  · Interdisciplinary Collaboration:   o Physical Therapist  o Registered Nurse  · After treatment position/precautions:   o Bed/Chair-wheels locked  o Bed in low position  o Call light within reach  o RN notified  o Up in bathroom    · Compliance with Program/Exercises: compliant all of the time. · Recommendations/Intent for next treatment session:  Treatment next visit will focus on increasing Mr. Jeni Ramirez independence with bed mobility, transfers, gait training, strength/ROM exercises, modalities for pain, and patient education.    Total Treatment Duration:  PT Patient Time In/Time Out  Time In: 1120  Time Out: [address], PT

## 2021-07-03 VITALS
HEIGHT: 67 IN | BODY MASS INDEX: 21.82 KG/M2 | RESPIRATION RATE: 16 BRPM | HEART RATE: 70 BPM | DIASTOLIC BLOOD PRESSURE: 67 MMHG | TEMPERATURE: 97.9 F | OXYGEN SATURATION: 94 % | WEIGHT: 139 LBS | SYSTOLIC BLOOD PRESSURE: 121 MMHG

## 2021-07-03 LAB
ANION GAP SERPL CALC-SCNC: 7 MMOL/L (ref 7–16)
BUN SERPL-MCNC: 8 MG/DL (ref 8–23)
CALCIUM SERPL-MCNC: 8.9 MG/DL (ref 8.3–10.4)
CHLORIDE SERPL-SCNC: 104 MMOL/L (ref 98–107)
CO2 SERPL-SCNC: 25 MMOL/L (ref 21–32)
CREAT SERPL-MCNC: 0.73 MG/DL (ref 0.8–1.5)
ERYTHROCYTE [DISTWIDTH] IN BLOOD BY AUTOMATED COUNT: 15 % (ref 11.9–14.6)
GLUCOSE SERPL-MCNC: 112 MG/DL (ref 65–100)
HCT VFR BLD AUTO: 33.4 % (ref 41.1–50.3)
HGB BLD-MCNC: 11.2 G/DL (ref 13.6–17.2)
MAGNESIUM SERPL-MCNC: 2.1 MG/DL (ref 1.8–2.4)
MCH RBC QN AUTO: 29.5 PG (ref 26.1–32.9)
MCHC RBC AUTO-ENTMCNC: 33.5 G/DL (ref 31.4–35)
MCV RBC AUTO: 87.9 FL (ref 79.6–97.8)
NRBC # BLD: 0 K/UL (ref 0–0.2)
PLATELET # BLD AUTO: 299 K/UL (ref 150–450)
PMV BLD AUTO: 8.2 FL (ref 9.4–12.3)
POTASSIUM SERPL-SCNC: 4 MMOL/L (ref 3.5–5.1)
RBC # BLD AUTO: 3.8 M/UL (ref 4.23–5.6)
SODIUM SERPL-SCNC: 136 MMOL/L (ref 136–145)
WBC # BLD AUTO: 8.8 K/UL (ref 4.3–11.1)

## 2021-07-03 PROCEDURE — 99218 HC RM OBSERVATION: CPT

## 2021-07-03 PROCEDURE — 36415 COLL VENOUS BLD VENIPUNCTURE: CPT

## 2021-07-03 PROCEDURE — 80048 BASIC METABOLIC PNL TOTAL CA: CPT

## 2021-07-03 PROCEDURE — 85027 COMPLETE CBC AUTOMATED: CPT

## 2021-07-03 PROCEDURE — 83735 ASSAY OF MAGNESIUM: CPT

## 2021-07-03 PROCEDURE — 74011250637 HC RX REV CODE- 250/637: Performed by: PHYSICIAN ASSISTANT

## 2021-07-03 PROCEDURE — 99024 POSTOP FOLLOW-UP VISIT: CPT | Performed by: NURSE PRACTITIONER

## 2021-07-03 PROCEDURE — 74011250636 HC RX REV CODE- 250/636: Performed by: PHYSICIAN ASSISTANT

## 2021-07-03 PROCEDURE — 97530 THERAPEUTIC ACTIVITIES: CPT

## 2021-07-03 RX ADMIN — Medication 10 ML: at 05:18

## 2021-07-03 RX ADMIN — HYDROMORPHONE HYDROCHLORIDE 2 MG: 2 TABLET ORAL at 05:39

## 2021-07-03 RX ADMIN — VANCOMYCIN HYDROCHLORIDE 1000 MG: 1 INJECTION, POWDER, LYOPHILIZED, FOR SOLUTION INTRAVENOUS at 02:10

## 2021-07-03 RX ADMIN — Medication 10 ML: at 05:17

## 2021-07-03 RX ADMIN — HYDROMORPHONE HYDROCHLORIDE 2 MG: 2 TABLET ORAL at 10:11

## 2021-07-03 NOTE — PROGRESS NOTES
Problem: Falls - Risk of  Goal: *Absence of Falls  Description: Document Naveen Llanes Fall Risk and appropriate interventions in the flowsheet.   Outcome: Progressing Towards Goal  Note: Fall Risk Interventions:  Mobility Interventions: Bed/chair exit alarm, Patient to call before getting OOB, Utilize walker, cane, or other assistive device    Mentation Interventions: Adequate sleep, hydration, pain control, More frequent rounding, Increase mobility, Toileting rounds, Room close to nurse's station, Update white board, Evaluate medications/consider consulting pharmacy, Door open when patient unattended    Medication Interventions: Patient to call before getting OOB, Evaluate medications/consider consulting pharmacy, Teach patient to arise slowly, Bed/chair exit alarm    Elimination Interventions: Call light in reach, Patient to call for help with toileting needs, Stay With Me (per policy), Toilet paper/wipes in reach, Toileting schedule/hourly rounds              Problem: Patient Education: Go to Patient Education Activity  Goal: Patient/Family Education  Outcome: Progressing Towards Goal     Problem: Upper Extremity Surgical Pathway: POD 1  Goal: Off Pathway (Use only if patient is Off Pathway)  Outcome: Progressing Towards Goal  Goal: Activity/Safety  Outcome: Progressing Towards Goal  Goal: Diagnostic Test/Procedures  Outcome: Progressing Towards Goal  Goal: Nutrition/Diet  Outcome: Progressing Towards Goal  Goal: Discharge Planning  Outcome: Progressing Towards Goal  Goal: Medications  Outcome: Progressing Towards Goal  Goal: Respiratory  Outcome: Progressing Towards Goal  Goal: Treatments/Interventions/Procedures  Outcome: Progressing Towards Goal  Goal: Psychosocial  Outcome: Progressing Towards Goal

## 2021-07-03 NOTE — PROGRESS NOTES
Problem: Mobility Impaired (Adult and Pediatric)  Goal: *Acute Goals and Plan of Care (Insert Text)  Outcome: Progressing Towards Goal  Note: GOALS (1-4 days):  (1.)  Patient will move from supine to sit and sit to supine  in bed with INDEPENDENT. (2.)  Patient will transfer from bed to chair and chair to bed with INDEPENDENT using the least restrictive device. Met 7/3  (3.)  Patient will ambulate with INDEPENDENT for 650 feet with the least restrictive device. Met 7/3  (4.)  Patient will be independent with shoulder HEP to increase range of motion per MD orders. Met 7/3  ________________________________________________________________________________________________        PHYSICAL THERAPY: Daily Note, Discharge, Treatment Day: 2nd and AM 7/3/2021  OBSERVATION: Hospital Day: 3  Payor: [facility] / [place] [facility] [place] / Product Type: Managed Care Medicare /      NAME/AGE/GENDER: Tarik Cook is a 69 y.o. male   PRIMARY DIAGNOSIS: Dislocation of prosthetic joint of shoulder, initial encounter ([facility] [T84.028A, Z96.619]  Status post reverse arthroplasty of shoulder, right [Z96.611]  Dislocation of prosthetic shoulder joint ([facility] [T49.563I, Z96.619]  Dislocation of prosthetic joint of shoulder ([facility] [C37.095N, Z96.619] Dislocation of prosthetic joint of shoulder (HCC) Dislocation of prosthetic joint of shoulder (HCC)  Procedure(s) (LRB):  RIGHT SHOULDER IRRIGATION AND DEBRIDEMENT (Right)  REVISION REVERSE RIGHT TOTAL SHOULDER ARTHROPLASTY WITH DELTA EXTEND PROSTHESIS (Right)  2 Days Post-Op  ICD-10: Treatment Diagnosis:   · Pain in Right Shoulder (M25.511)  · Stiffness of Right Shoulder, Not elsewhere classified (M25.611)  · Other abnormalities of gait and mobility (R26.89)   Precaution/Allergies:  Patient has no known allergies.    MD Rx :  ACTIVE/PASSIVE ROM RIGHT ELBOW, WRIST, AND HAND   Gentle PENDULUMS   No other motion   NWB RIGHT SHOULDER   WBAT BILATERAL LE   SLING RIGHT SHOULDER     ASSESSMENT:     Mr. Tyrell Lackey showed good understanding of HEP with written guidelines & improved level of assist for transfers & gait. Pt is ready for the next phase of his rehab program.    This section established at most recent assessment   PROBLEM LIST (Impairments causing functional limitations):  1. Decreased [place] with Bed Mobility  2. Decreased [place] with Transfers  3. Decreased [place] with Ambulation   4. Decreased [place] with shoulder HEP   INTERVENTIONS PLANNED: (Benefits and precautions of physical therapy have been discussed with the patient.)  1. Bed Mobility Training  2. Transfer Training  3. Gait Training  4. Therapeutic Exercises per MD orders  5. Modalities for Pain     TREATMENT PLAN: Frequency/Duration: twice daily for duration of hospital stay  Rehabilitation Potential For Stated Goals: Excellent     RECOMMENDED REHABILITATION/EQUIPMENT: (at time of discharge pending progress): Continue Skilled Therapy and Home Health: Physical Therapy. HISTORY:   History of Present Injury/Illness (Reason for Referral): Admitted for revision of right reverse TSA. Past Medical History/Comorbidities:   Mr. Tyrell Lackey  has a past medical history of Allergic rhinitis, Arthritis, Broken arm (06/18/2021), Cancer ([facility] (2019), Chronic pain, COPD (chronic obstructive pulmonary disease) ([facility] (09/28/2016), Current every day smoker, Hypercholesterolemia, Hypertension, Hypothyroidism (9/28/2016), Long-term use of high-risk medication (8/26/2019), Marijuana user, Osteopenia (9/28/2016), Prostate cancer ([facility] (2016), and Urinary frequency. He also has no past medical history of Difficult intubation, Malignant hyperthermia due to anesthesia, Nausea & vomiting, or Pseudocholinesterase deficiency.   Mr. Tyrell Lackey  has a past surgical history that includes hx partial thyroidectomy (Left); hx hernia repair; hx orthopaedic (Right, 03/25/2021); hx prostatectomy (2016); hx urological; hx orthopaedic (Left, 06/24/2021); and hx cataract removal (Bilateral). Social History/Living Environment:      Prior Level of Function/Work/Activity:  Independent prior to admit without assistive device   Number of Personal Factors/Comorbidities that affect the Plan of Care: 0: LOW COMPLEXITY   EXAMINATION:   Most Recent Physical Functioning:   Gross Assessment: both LE's WFL strength & ROM                       Balance:  Sitting: Intact; Without support  Standing: Intact; Without support Bed Mobility:  Rolling:  (NT)  Supine to Sit:  (NT)  Sit to Supine:  (NT)  Scooting: Independent       Transfers:  Sit to Stand: Independent  Stand to Sit: Independent  Bed to Chair: Independent  Duration: 23 Minutes (extra time to work through activity noted)  Gait:     Distance (ft): 400 Feet (ft)  Assistive Device:  (none)  Ambulation - Level of Assistance: Independent  Number of Stairs Trained: 2  Stairs - Level of Assistance: Independent  Rail Use:  (none)  Interventions: Safety awareness training;Verbal cues      Body Structures Involved:  1. Joints  2. Muscles Body Functions Affected:  1. Movement Related Activities and Participation Affected:  1. Mobility   Number of elements that affect the Plan of Care: 4+: HIGH COMPLEXITY   CLINICAL PRESENTATION:   Presentation: Stable and uncomplicated: LOW COMPLEXITY   CLINICAL DECISION MAKING:   [employer] AM-PAC 6 Clicks   Basic Mobility Inpatient Short Form  How much difficulty does the patient currently have. .. Unable A Lot A Little None   1. Turning over in bed (including adjusting bedclothes, sheets and blankets)? [] 1   [] 2   [x] 3   [] 4   2. Sitting down on and standing up from a chair with arms ( e.g., wheelchair, bedside commode, etc.)   [] 1   [] 2   [x] 3   [] 4   3. Moving from lying on back to sitting on the side of the bed? [] 1   [] 2   [x] 3   [] 4   How much help from another person does the patient currently need. .. Total A Lot A Little None   4. Moving to and from a bed to a chair (including a wheelchair)? [] 1   [] 2   [x] 3   [] 4   5. Need to walk in hospital room? [] 1   [] 2   [x] 3   [] 4   6. Climbing 3-5 steps with a railing? [] 1   [] 2   [x] 3   [] 4   © 2007, Trustees of [address] [place] [address], under license to [email or web address]. All rights reserved      Score:  Initial: 18 Most Recent: X (Date: -- )    Interpretation of Tool:  Represents activities that are increasingly more difficult (i.e. Bed mobility, Transfers, Gait). Medical Necessity:     · Patient is expected to demonstrate progress in   · strength, range of motion, and functional technique  ·  to   · increase independence with HEP and functional mobilktiy  · . Reason for Services/Other Comments:  · Patient continues to require skilled intervention due to   · Limited functional independence  · . Use of outcome tool(s) and clinical judgement create a POC that gives a: Clear prediction of patient's progress: LOW COMPLEXITY            TREATMENT:   (In addition to Assessment/Re-Assessment sessions the following treatments were rendered)   Pre-treatment Symptoms/Complaints:   Pain: Initial: numeric scale  Pain Intensity 1: 2  Pain Location 1: Shoulder  Pain Orientation 1: Right  Pain Intervention(s) 1: Repositioned  Post Session:  2/10       Therapeutic Activity: (  23 Minutes (extra time to work through activity noted) ):  Therapeutic activities including reviewed HEP with written guidelines provided, transfers, progressive gait & stair training to improve mobility, strength, balance, coordination and dynamic movement of arm - left and leg - bilateral to improve functional endurance & stability.        Date:  7/2 Date:  7/3 Date:     ACTIVITY/EXERCISE AM PM AM PM AM PM   Gripping 10 10 25      Wrist Flexion/Extension 10 10 25      Wrist Ulnar/Radial Deviation         Pronation/Supination 10 10 25      Elbow Flexion/Extension 10aa 10aa 25      Shoulder Flexion/Extension         Shoulder AB/ADduction         Shoulder IR/ER         Pulleys         Pendulums 10  25p gentle CW & CCW      Shrugs 10        Isometric:                 Flexion         Extension         ABduction         ADduction         Biceps/Triceps                  B = bilateral; AA = active assistive; A = active; P = passive  Education:  [x]  Home Exercises  [x]  Sling Application   [x]  Movement Precautions   []  Pulleys   [x]  Use of Ice   []  Other:   Treatment/Session Assessment:    · Response to Treatment:  No concerns, p eager to return home  · Interdisciplinary Collaboration:   o Registered Nurse  · After treatment position/precautions:   o Up in chair  o Bed/Chair-wheels locked  o Caregiver at bedside  o Call light within reach  o RN notified  o Family at bedside   · Compliance with Program/Exercises: compliant all of the time. Recommendations/ DC PT services.   Total Treatment Duration:  PT Patient Time In/Time Out  Time In: 0941  Time Out: ASHLEY Franco

## 2021-07-03 NOTE — PROGRESS NOTES
July 3, 2021         Post Op day: 2 Days Post-Op     Admit Date: 2021  Admit Diagnosis: Dislocation of prosthetic joint of shoulder, initial encounter [facility]) [V43.169Q, Z96.619]  Status post reverse arthroplasty of shoulder, right [Z96.611]  Dislocation of prosthetic shoulder joint ([facility] [Y32.773L, Z96.619]  Dislocation of prosthetic joint of shoulder ([facility] [T84.028A, Z96.619]        Subjective: Patient is status-post Procedure(s) (LRB):  RIGHT SHOULDER IRRIGATION AND DEBRIDEMENT (Right)  REVISION REVERSE RIGHT TOTAL SHOULDER ARTHROPLASTY WITH DELTA EXTEND PROSTHESIS (Right). Patient doing well. No concerns/complaints. No shortness of breath, chest pain or nausea/vomiting. Objective:   Visit Vitals  /67 (BP 1 Location: Left upper arm, BP Patient Position: At rest)   Pulse 70   Temp 97.9 °F (36.6 °C)   Resp 16   Ht 5' 7\" (1.702 m)   Wt 139 lb (63 kg)   SpO2 94%   BMI 21.77 kg/m²    Temp (24hrs), Av.5 °F (36.9 °C), Min:97.9 °F (36.6 °C), Max:99 °F (37.2 °C)    Lab Results   Component Value Date/Time    HGB 11.2 (L) 2021 04:35 AM     Extremity Exam  Dressing Clean, dry, intact.   Neuro intact and unchanged right extremity  Sensation intact to light touch  Extremity perfused  No sign of DVT     Assessment / Plan :  S/p Procedure(s) (LRB):  RIGHT SHOULDER IRRIGATION AND DEBRIDEMENT (Right)  REVISION REVERSE RIGHT TOTAL SHOULDER ARTHROPLASTY WITH DELTA EXTEND PROSTHESIS (Right)  Continue current postoperative plan  Cultures negative to date  PT/OT-Continue current weightbearing status and restrictions of involved extremities  Continue current VTE prophylaxis  DIspo-HH  Patient Active Problem List   Diagnosis Code    Chronic gastritis K29.50    Prostate cancer ([facility] C61    Osteopenia M85.80    Tobacco abuse Z72.0    Dyslipidemia E78.5    High risk medication use Z79.899    Chronic bilateral low back pain without sciatica M54.5, G89.29    History of thyroid cancer Z85.850    History of adenomatous polyp of colon Z86.010    Mild episode of recurrent major depressive disorder (HCC) F33.0    DDD (degenerative disc disease), lumbar M51.36    Erectile dysfunction following radical prostatectomy N52.31    Malignant neoplasm of urinary bladder (HCC) C67.9    Bruit of left carotid artery R09.89    Weakness generalized R53.1    Refused influenza vaccine Z28.21    Pain in both hands M79.641, M79.642    Chronic right shoulder pain M25.511, G89.29    Rotator cuff tear arthropathy of right shoulder M75.101, M12.811    Right rotator cuff tear arthropathy M75.101, M12.811    Nuclear senile cataract H25.10    Mucous cyst of finger M67.449    Other intraarticular fracture of lower end of left radius, initial encounter for closed fracture S52.572A    Dislocation of prosthetic joint of shoulder ([facility]) T84.028A, Z96.619    S/P reverse total shoulder arthroplasty, right Z96.611    Dislocation of prosthetic shoulder joint ([facility] E14.888R, O08.674          Signed By: Joanie Kiser NP

## 2021-07-03 NOTE — PROGRESS NOTES
Care Management Interventions  PCP Verified by CM:  Yes  Mode of Transport at Discharge: Self  Transition of Care Consult (CM Consult): [address]: Yes  Physical Therapy Consult: Yes  Current Support Network: Lives with Spouse (lives w/ ex-wife)  Confirm Follow Up Transport: Family  The Plan for Transition of Care is Related to the Following Treatment Goals : improve strength  The Patient and/or Patient Representative was Provided with a Choice of Provider and Agrees with the Discharge Plan?: Yes  Freedom of Choice List was Provided with Basic Dialogue that Supports the Patient's Individualized Plan of Care/Goals, Treatment Preferences and Shares the Quality Data Associated with the Providers?: Yes  Discharge Location  Discharge Placement: Home with home health

## 2021-07-03 NOTE — DISCHARGE INSTRUCTIONS
Shoulder Arthroplasty:  What to Expect at Home:    Your Recovery    Your arm will be in a sling. You will feel tired for several days. Your shoulder will be swollen and you may notice that your skin is a different color near the cuts the doctor made (incisions). Your hand and arm may also be swollen. This is normal and will go away in a few days. When you can return to work or your usual routine will depend on your physician. Most people need 6 weeks or longer to recover. You may have to limit your activity until your shoulder strength and range of motion return to normal.  You may also be in a rehabilitation program (rehab). If you have a desk job, you may be able to return to work a little bit sooner than 6 weeks. If you lift things at work, it may take months before you return to work. This is something you will need to discuss with your doctor. How can you care for yourself at home? Activity     Rest when you feel tired. Getting enough sleep will help you recover. Lying flat may cause your pain to worsen. Any position that is comfortable for you is fine. Many people are most comfortable in an upright position. Raise your upper body on two or three pillows, or sleep in a reclining chair. Do not sleep on your side. To make your arm and shoulder feel better, keep a thin pillow under the back of your arm while you are lying in bed.  Try to walk each day. Start by walking a little more than you did the day before. Bit by bit, increase the distance you walk. Walking boosts blood flow and helps prevent pneumonia and constipation.  Your arm will be in a sling. Your doctor will advise you on how long to wear it.  You may remove the sling when you shower or dress.  Do not use your arm for repeated movements. These include painting, vacuuming, or using a computer. Diet     You may eat your normal diet. You may experience some nausea while taking pain medicines.   If this happens, try bland, low fat foods like plain rice, broiled chicken, toast, or yogurt.  Drink plenty of fluids, unless instructed otherwise by your doctor.  You may notice that your bowel movements are not regular right after surgery. This is common. Try to avoid constipation and straining with bowel movements. You will probably need to take a stimulant laxative daily while you are taking the pain medicine. If you have not had a bowel movement after a couple of days, notify your doctor. Medicines     Take pain medicines as directed by your doctor.  Please notify your doctor of any over the counter pain medicines you may be taking, such as acetaminophen (Tylenol), ibuprofen (Advil, Motrin), or naproxen (Aleve).  If you think your pain medicine is making you sick to your stomach, try taking it after meals. If this doesnt help, ask your doctor for a different pain medicine. Incision Care     If you have a dressing over your incision, keep it clean and dry. You may usually remove it 2 to 3 days after surgery. Your doctor will instruct on his specific dressing instructions.  If your incision is open to air, keep the area clean and dry.  If you have strips of tape on the incision, leave it on until it falls off. Exercise     You may need rehabilitation. This is a series of exercises you do after surgery. This will help you get back your shoulders range of motion and strength. You will work with your doctor and Physical therapist to plan this exercises program.  To get the best results, you need to do the exercises correctly and as often and as long as your doctor tells you. Ice     To reduce swelling and pain, put an ice or cold pack on your shoulder for 20 minute intervals. Do this every 1 to 2 hours while you are awake. Put a thin cloth between the ice and your skin. Follow up care is a key part of your treatment and safety.   Be sure to make and go to all appointments, and call your doctor if you are having problems. Its also a good idea to know your test results and keep an up to date list of your current medications. When should you call for help? Call 911 anytime you think you may need emergency care. For example, call if:     You passed out (lost consciousness).  You have severe trouble breathing.  You have sudden chest pain and shortness of breath, or you cough up blood. Call your doctor now or seek immediate medical care if:     Your hand is cool, pale, numb, or changes color.  You are unable to move your fingers, wrist, or elbow.  You are sick to your stomach and cannot keep liquids down.  You have pain that does not get better after you take your pain medicine.  You have signs of infection, such as:    *Increased pain, swelling, warmth, or redness  *Red streaks from the incision  *Pus draining from the incision  *Swollen lymph nodes in your neck, armpits, or groin  *Fever (If you feel like you have a fever, please take your temperature before calling doctor).  You have loose stitches or your incision comes open.  Your incision bleeds through your first dressing or is still actively bleeding 3 days after your surgery. Watch closely for changes in your health, and be sure to contact your doctor if:     You have new or increased swelling in your arm.  You have new pain that develops in another area of the affected limb. For example, you have pain in your hand or elbow.  You do not have a bowel movement after taking a laxative.  You do not get better as expected.

## 2021-07-04 LAB
BACTERIA SPEC CULT: NORMAL
GRAM STN SPEC: NORMAL
SERVICE CMNT-IMP: NORMAL

## 2021-07-05 ENCOUNTER — HOME CARE VISIT (OUTPATIENT)
Dept: SCHEDULING | Facility: HOME HEALTH | Age: 71
End: 2021-07-05
Payer: MEDICARE

## 2021-07-05 VITALS
SYSTOLIC BLOOD PRESSURE: 120 MMHG | DIASTOLIC BLOOD PRESSURE: 70 MMHG | HEART RATE: 84 BPM | TEMPERATURE: 98.3 F | OXYGEN SATURATION: 97 % | RESPIRATION RATE: 18 BRPM

## 2021-07-05 PROCEDURE — G0151 HHCP-SERV OF PT,EA 15 MIN: HCPCS

## 2021-07-05 PROCEDURE — 3331090001 HH PPS REVENUE CREDIT

## 2021-07-05 PROCEDURE — 400013 HH SOC

## 2021-07-05 PROCEDURE — 3331090002 HH PPS REVENUE DEBIT

## 2021-07-05 PROCEDURE — 400018 HH-NO PAY CLAIM PROCEDURE

## 2021-07-06 PROCEDURE — 3331090001 HH PPS REVENUE CREDIT

## 2021-07-06 PROCEDURE — 3331090002 HH PPS REVENUE DEBIT

## 2021-07-06 NOTE — DISCHARGE SUMMARY
[facility] SUMMARY    Name:  Lester Moya  MR#:  [id number]  :  1950  ACCOUNT #:  [de-identified]  ADMIT DATE:  2021  DISCHARGE DATE:  2021    ADMISSION DIAGNOSIS:  Periprosthetic instability status post reverse right total shoulder arthroplasty. 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 Please see H and P, operative summary, and consult for details. HOSPITAL COURSE:  The patient is a 50-year-old gentleman with a complicated history. The patient was admitted on 2021, underwent an uncomplicated revision reverse right total shoulder arthroplasty. On postoperative day #1, his hemoglobin was 11.4. Labs were within normal limits. He was having some pain. He was kept overnight on postoperative day #1 for observation. On postoperative day #2, all labs were within normal limits. His pain was better controlled. He was discharged home on postoperative day #2 and will follow up in my office in 1 week. Tay Grijalva MD      [id number]  D:  2021 7:49  T:  2021 9:41  JOB #:  [id number]  CC:   Keila Iyer MD

## 2021-07-07 ENCOUNTER — HOME CARE VISIT (OUTPATIENT)
Dept: SCHEDULING | Facility: HOME HEALTH | Age: 71
End: 2021-07-07
Payer: MEDICARE

## 2021-07-07 VITALS
HEART RATE: 76 BPM | TEMPERATURE: 98.4 F | DIASTOLIC BLOOD PRESSURE: 84 MMHG | OXYGEN SATURATION: 100 % | RESPIRATION RATE: 16 BRPM | SYSTOLIC BLOOD PRESSURE: 120 MMHG

## 2021-07-07 PROCEDURE — 3331090001 HH PPS REVENUE CREDIT

## 2021-07-07 PROCEDURE — 3331090002 HH PPS REVENUE DEBIT

## 2021-07-07 PROCEDURE — G0151 HHCP-SERV OF PT,EA 15 MIN: HCPCS

## 2021-07-08 PROCEDURE — 3331090002 HH PPS REVENUE DEBIT

## 2021-07-08 PROCEDURE — 3331090001 HH PPS REVENUE CREDIT

## 2021-07-09 ENCOUNTER — HOME CARE VISIT (OUTPATIENT)
Dept: SCHEDULING | Facility: HOME HEALTH | Age: 71
End: 2021-07-09
Payer: MEDICARE

## 2021-07-09 PROCEDURE — 3331090002 HH PPS REVENUE DEBIT

## 2021-07-09 PROCEDURE — G0151 HHCP-SERV OF PT,EA 15 MIN: HCPCS

## 2021-07-09 PROCEDURE — 3331090001 HH PPS REVENUE CREDIT

## 2021-07-10 PROCEDURE — 3331090001 HH PPS REVENUE CREDIT

## 2021-07-10 PROCEDURE — 3331090002 HH PPS REVENUE DEBIT

## 2021-07-11 PROCEDURE — 3331090002 HH PPS REVENUE DEBIT

## 2021-07-11 PROCEDURE — 3331090001 HH PPS REVENUE CREDIT

## 2021-07-12 ENCOUNTER — HOME CARE VISIT (OUTPATIENT)
Dept: SCHEDULING | Facility: HOME HEALTH | Age: 71
End: 2021-07-12
Payer: MEDICARE

## 2021-07-12 ENCOUNTER — HOME CARE VISIT (OUTPATIENT)
Dept: HOME HEALTH SERVICES | Facility: HOME HEALTH | Age: 71
End: 2021-07-12
Payer: MEDICARE

## 2021-07-12 PROCEDURE — 3331090001 HH PPS REVENUE CREDIT

## 2021-07-12 PROCEDURE — 3331090002 HH PPS REVENUE DEBIT

## 2021-07-12 PROCEDURE — G0157 HHC PT ASSISTANT EA 15: HCPCS

## 2021-07-12 NOTE — CASE COMMUNICATION
discontinue: Promethazine (phenergan), patient is taking ondansetron (zofran), already on list  Reviewed with and left updated med list with patient

## 2021-07-13 VITALS
SYSTOLIC BLOOD PRESSURE: 128 MMHG | TEMPERATURE: 98.1 F | DIASTOLIC BLOOD PRESSURE: 76 MMHG | HEART RATE: 81 BPM | RESPIRATION RATE: 18 BRPM

## 2021-07-13 PROCEDURE — 3331090001 HH PPS REVENUE CREDIT

## 2021-07-13 PROCEDURE — 3331090002 HH PPS REVENUE DEBIT

## 2021-07-14 ENCOUNTER — HOME CARE VISIT (OUTPATIENT)
Dept: SCHEDULING | Facility: HOME HEALTH | Age: 71
End: 2021-07-14
Payer: MEDICARE

## 2021-07-14 VITALS
TEMPERATURE: 97.3 F | RESPIRATION RATE: 18 BRPM | HEART RATE: 81 BPM | DIASTOLIC BLOOD PRESSURE: 74 MMHG | SYSTOLIC BLOOD PRESSURE: 126 MMHG

## 2021-07-14 PROCEDURE — 3331090002 HH PPS REVENUE DEBIT

## 2021-07-14 PROCEDURE — G0151 HHCP-SERV OF PT,EA 15 MIN: HCPCS

## 2021-07-14 PROCEDURE — 3331090001 HH PPS REVENUE CREDIT

## 2021-07-16 LAB
BACTERIA SPEC CULT: ABNORMAL
SERVICE CMNT-IMP: ABNORMAL

## 2021-08-04 LAB
Lab: NORMAL
REFERENCE LAB,REFLB: NORMAL
TEST DESCRIPTION:,ATST: NORMAL

## 2021-09-02 PROBLEM — F11.99 OPIOID USE, UNSPECIFIED WITH UNSPECIFIED OPIOID-INDUCED DISORDER (HCC): Status: ACTIVE | Noted: 2021-09-02

## 2021-09-08 ENCOUNTER — ANESTHESIA EVENT (OUTPATIENT)
Dept: SURGERY | Age: 71
End: 2021-09-08
Payer: MEDICARE

## 2021-09-08 ENCOUNTER — HOSPITAL ENCOUNTER (OUTPATIENT)
Dept: SURGERY | Age: 71
Discharge: HOME | End: 2021-09-08
Attending: ORTHOPAEDIC SURGERY
Payer: MEDICARE

## 2021-09-08 VITALS
TEMPERATURE: 97.5 F | DIASTOLIC BLOOD PRESSURE: 87 MMHG | SYSTOLIC BLOOD PRESSURE: 139 MMHG | RESPIRATION RATE: 18 BRPM | OXYGEN SATURATION: 99 % | HEART RATE: 71 BPM

## 2021-09-08 PROBLEM — T84.028A DISLOCATION OF PROSTHETIC SHOULDER JOINT (HCC): Status: ACTIVE | Noted: 2021-09-08

## 2021-09-08 PROBLEM — Z96.619 DISLOCATION OF PROSTHETIC JOINT OF SHOULDER (HCC): Status: RESOLVED | Noted: 2021-06-30 | Resolved: 2021-09-08

## 2021-09-08 PROBLEM — Z96.619 DISLOCATION OF PROSTHETIC SHOULDER JOINT (HCC): Status: ACTIVE | Noted: 2021-09-08

## 2021-09-08 PROBLEM — T84.028A DISLOCATION OF PROSTHETIC SHOULDER JOINT (HCC): Status: RESOLVED | Noted: 2021-07-01 | Resolved: 2021-09-08

## 2021-09-08 PROBLEM — T84.028A INSTABILITY OF REVERSE TOTAL RIGHT SHOULDER ARTHROPLASTY (HCC): Status: ACTIVE | Noted: 2021-09-08

## 2021-09-08 PROBLEM — Z96.611 STATUS POST REVERSE TOTAL ARTHROPLASTY OF RIGHT SHOULDER: Status: RESOLVED | Noted: 2021-06-30 | Resolved: 2021-09-08

## 2021-09-08 PROBLEM — Z96.611 INSTABILITY OF REVERSE TOTAL RIGHT SHOULDER ARTHROPLASTY (HCC): Status: ACTIVE | Noted: 2021-09-08

## 2021-09-08 PROBLEM — T84.028A DISLOCATION OF PROSTHETIC JOINT OF SHOULDER (HCC): Status: RESOLVED | Noted: 2021-06-30 | Resolved: 2021-09-08

## 2021-09-08 PROBLEM — Z96.619 DISLOCATION OF PROSTHETIC SHOULDER JOINT (HCC): Status: RESOLVED | Noted: 2021-07-01 | Resolved: 2021-09-08

## 2021-09-08 LAB
ANION GAP SERPL CALC-SCNC: 4 MMOL/L (ref 7–16)
BACTERIA SPEC CULT: NORMAL
BUN SERPL-MCNC: 18 MG/DL (ref 8–23)
CALCIUM SERPL-MCNC: 9.7 MG/DL (ref 8.3–10.4)
CHLORIDE SERPL-SCNC: 103 MMOL/L (ref 98–107)
CO2 SERPL-SCNC: 30 MMOL/L (ref 21–32)
CREAT SERPL-MCNC: 0.82 MG/DL (ref 0.8–1.5)
ERYTHROCYTE [DISTWIDTH] IN BLOOD BY AUTOMATED COUNT: 14 % (ref 11.9–14.6)
GLUCOSE SERPL-MCNC: 109 MG/DL (ref 65–100)
HCT VFR BLD AUTO: 36.2 % (ref 41.1–50.3)
HGB BLD-MCNC: 11.7 G/DL (ref 13.6–17.2)
MCH RBC QN AUTO: 29 PG (ref 26.1–32.9)
MCHC RBC AUTO-ENTMCNC: 32.3 G/DL (ref 31.4–35)
MCV RBC AUTO: 89.8 FL (ref 79.6–97.8)
NRBC # BLD: 0 K/UL (ref 0–0.2)
PLATELET # BLD AUTO: 442 K/UL (ref 150–450)
PMV BLD AUTO: 8.6 FL (ref 9.4–12.3)
POTASSIUM SERPL-SCNC: 4.6 MMOL/L (ref 3.5–5.1)
RBC # BLD AUTO: 4.03 M/UL (ref 4.23–5.6)
SERVICE CMNT-IMP: NORMAL
SODIUM SERPL-SCNC: 137 MMOL/L (ref 136–145)
WBC # BLD AUTO: 9 K/UL (ref 4.3–11.1)

## 2021-09-08 PROCEDURE — 36415 COLL VENOUS BLD VENIPUNCTURE: CPT

## 2021-09-08 PROCEDURE — 85027 COMPLETE CBC AUTOMATED: CPT

## 2021-09-08 PROCEDURE — 80048 BASIC METABOLIC PNL TOTAL CA: CPT

## 2021-09-08 PROCEDURE — 87641 MR-STAPH DNA AMP PROBE: CPT

## 2021-09-08 RX ORDER — SODIUM CHLORIDE 0.9 % (FLUSH) 0.9 %
5-40 SYRINGE (ML) INJECTION EVERY 8 HOURS
Status: CANCELLED | OUTPATIENT
Start: 2021-09-08

## 2021-09-08 RX ORDER — SODIUM CHLORIDE 0.9 % (FLUSH) 0.9 %
5-40 SYRINGE (ML) INJECTION AS NEEDED
Status: CANCELLED | OUTPATIENT
Start: 2021-09-08

## 2021-09-08 NOTE — H&P
Name: Grace Cagle  YOB: 1950  Gender: male  MRN: [id number]      What: Right shoulder injury and right elbow pain  How: Helping a friend working on a car radiator  When: 1 month ago      HPI: Grace Cagle is a 69 y.o. right-hand-dominant gentleman seen for right shoulder and right elbow problems. He is 2 months status post revision reverse right total shoulder arthroplasty. He returned for his 1 week postop appointment. But I have not seen him since. Shortly after that visit. He was helping a friend and was working on a car radiator when he reached back and felt a pop in the right shoulder. He also hurt his right elbow. He presented to our office yesterday with complaints of shoulder pain. He still had his suture in place from his revision surgery 2 months prior. Radiographs demonstrated a dislocated reverse right total shoulder arthroplasty. He is complaining of right elbow pain too. ROS/Meds/PSH/PMH/FH/SH: A ten system review of systems was performed and is negative other than what is in the HPI. Tobacco:  reports that he has been smoking cigarettes. He started smoking about 58 years ago. He has a 177.00 pack-year smoking history. He has never used smokeless tobacco.  There were no vitals taken for this visit. Physical Examination:  On exam he is awake alert pleasant gentleman ambulating without difficulty. The left shoulder has 0 to 180 degrees of active and 0 to 180 degrees passive forward elevation. Internal rotation is to T6. External rotation is to 60 degrees at the side. In the 90 degree abducted position 90 degrees of external and 90 degrees internal rotation  The AC joint is non-tender  SC joint is non-tender. Greater tuberosity is non-tender.   negative biceps  Negative O'Briens sign  negative lift-off sign  Negative belly press sign  Negative bear huggers sign  negative drop sign  negative hornblower's sign  No posterior glenohumeral joint line tenderness. No evident excessive external rotation  Rotator cuff strength is 5/5.  negative external rotation stress test.   Negative empty can sign  There is no evident anterior or posterior apprehension with a negative sulcus sign. No instability  negative external and internal Rotation lag sign  Neurovascularly intact. The left elbow has a range of motion of 0 to 135 with 85 degrees of supination and 75 degrees of pronation. Patient is non-tender over the medial epicondyle  non-tender over the ulnar nerve with no evidence of any subluxation or dislocation. Negative tinel at the cubital tunnel  Negative flexor pronator stress test.  Patient is non-tender over the radial tunnel  non-tender over the lateral epicondyle with a negative wrist extensor stress test.   The patient is non-tender when shaking hands. Negative middle finger extension stress test.  The biceps tendon is intact. Present hook test.  Negative reverse page sign  The left elbow is stable at 0, 30, 70, 90, degrees. No swelling or erythema over the olecranon bursa. Patient has 2+ radial and ulnar pulses and neurovascularly is intact. The right shoulder is well-healed deltopectoral incision  There is a prominence anteriorly and swelling no erythema consistent with an anterior dislocation    Exam is limited due to pain but he does appear neurovascularly intact    Right elbow has a range of motion of 30-1 30 with 45 degrees of pronation supination exam is limited due to pain no erythema he appears neurovascular intact        Data Reviewed:          XR: AP lateral and axial views right elbow   Clinical Indication  No diagnosis found. Report: AP lateral and axial views right elbow demonstrate no fracture no dislocation.   There is a preserved joint space    Impression: Normal right elbow   Félix Medel MD     Radiographs of the right shoulder from yesterday 9/2/2021 including AP Y and axillary views right shoulder  AP, Y and axillary views right shoulder demonstrate an anterior dislocation of a reverse right total shoulder arthroplasty          Impression:     ICD-10-CM ICD-9-CM    1. Dislocation of prosthetic joint of shoulder, initial encounter ([facility] [address]  T84.028A 996.42 REFERRAL TO ORTHOPEDIC SURGERY    Z96.619     2. Sprain of right elbow, initial encounter  S53.401A 841.9 XR ELBOW RT AP/LAT   3. 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 Nupur Velasquez Status post reverse right total shoulder arthroplasty with a delta extended prosthesis latissimus dorsi teres major tendon transfer over 3 months   AC arthritis right shoulder   Rotator cuff arthropathy left shoulder   Cervical spondylosis   Hypertension   History of prostate cancer stage undetermined   COPD with nicotine addiction   GERD   Osteopenia   History bladder cancer on BCG   Hypercholesterolemia   Noncompliance    Plan:   I discussed the problem with the patient. I do not see any significant right elbow pathology. In regards to his right shoulder he has dislocated again and it has been out for roughly 1 month. He was seen yesterday and he still had his surgical suture in place from his previous revision surgery. He did not follow-up with us for the normal postoperative course. So I discussed the risks and benefits of treatment modalities with Mr. Guanakito Goldberg. The question is whether there is evidence of possible infection and/or loosening given his recurrent instability.   After lengthy discussion with him I discussed the risk and benefits of treatment modalities. Is when he has exhausted nonoperative modalities. He would be a candidate for incision, debridement, revision reverse right total shoulder arthroplasty. This will be performed utilizing general anesthesia with interscalene block on an inpatient basis. I would hold antibiotics. I will get a frozen section and cultures. He would get a Damico catheter he would then get weight adjusted vancomycin. I would also measure CBC ESR CRP. I would attempt to perform the procedure in a single stage revision. If there was any evidence of infection I would remove all components he will get a remedy cement spacer 6 weeks of IV antibiotics followed by a second stage revision 6 weeks down the road. I discussed the complexity of his particular problem. If there was recurrent instability he may need a custom implant and in that setting I would remove all components as well and or place a hemiarthroplasty in the short-term. We discussed all options in detail. We will proceed as outlined above. This is an extremely complex operative procedure given his clinical history.     5.  Acute illness that poses a threat to bodily function      T84.028  Erik Mackenzie MD

## 2021-09-08 NOTE — H&P
Subjective:     Patient is a 69 y.o. RHD MALE WITH RIGHT SHOULDER PAIN. SEE OFFICE NOTE.     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 ([address] 09/08/2021    Opioid use, unspecified with unspecified opioid-induced disorder 09/02/2021    Other intraarticular fracture of lower end of left radius, initial encounter for closed fracture 06/22/2021    Right rotator cuff tear arthropathy 03/25/2021    Rotator cuff tear arthropathy of right shoulder 03/20/2021    Refused influenza vaccine 02/21/2021    Pain in both hands 02/21/2021    Acute pain of right shoulder 02/21/2021    Bruit of left carotid artery 08/31/2020    Weakness generalized 08/31/2020    Malignant neoplasm of urinary bladder ([address] 11/14/2019    DDD (degenerative disc disease), lumbar 09/05/2019    Mild episode of recurrent major depressive disorder ([address] 08/28/2019    Chronic bilateral low back pain without sciatica 08/27/2019    History of thyroid cancer 08/27/2019    Dyslipidemia 08/26/2019    High risk medication use 08/26/2019    Mucous cyst of finger 03/20/2019    Tobacco abuse 09/01/2017    Chronic gastritis 09/28/2016    Prostate cancer ([address] 09/28/2016    Osteopenia 09/28/2016    Nuclear senile cataract 06/22/2016    Erectile dysfunction following radical prostatectomy 12/22/2015    History of adenomatous polyp of colon 04/30/2015     Past Medical History:   Diagnosis Date    Allergic rhinitis     Arthritis     osteo    Broken arm 06/18/2021    left     Cancer ([address] 2019    bladder cancer---- surg and instillation-- followed by dr Ho Lewis    Chronic pain     back    COPD (chronic obstructive pulmonary disease) ([address] 09/28/2016    pt denies---- no meds    Current every day smoker     Hypercholesterolemia     Hypertension     hx-- off meds x 1 1/2 yr-- followed by dr Javid Serra Hypothyroidism 9/28/2016    Long-term use of high-risk medication 8/26/2019    Marijuana user     \"2-3 times/week\"    Osteopenia 9/28/2016    Prostate cancer ([facility] [address] 2016    surg only    Urinary frequency       Past Surgical History:   Procedure Laterality Date    HX CATARACT REMOVAL Bilateral     HX HERNIA REPAIR      HX ORTHOPAEDIC Right 03/25/2021    shoulder    HX ORTHOPAEDIC Left 06/24/2021    distal radius ORIF    HX PARTIAL THYROIDECTOMY Left     HX PROSTATECTOMY  2016    HX UROLOGICAL      bladder tumor removed      Prior to Admission medications    Medication Sig Start Date End Date Taking? Authorizing Provider   HYDROcodone-acetaminophen (NORCO) 5-325 mg per tablet Take 1 Tablet by mouth every six (6) hours as needed for Pain for up to 7 days. Max Daily Amount: 4 Tablets. 9/2/21 9/9/21  Scot Mirza NP   acetaminophen (TYLENOL) 500 mg tablet Take 1,000 mg by mouth every six (6) hours as needed for Pain. Provider, Historical   ondansetron (ZOFRAN ODT) 4 mg disintegrating tablet Take 1 Tablet by mouth every eight (8) hours as needed for Nausea. 6/23/21   Scot Mirza NP   pyridoxine, vitamin B6, (Vitamin B-6) 100 mg tablet Take 100 mg by mouth two (2) times a day. Provider, Historical   oxybutynin (DITROPAN) 5 mg tablet Take 5 mg by mouth daily for Bladder Spasms. 12/29/20   Provider, Historical   loratadine (CLARITIN) 10 mg tablet Take 10 mg by mouth daily as needed. Indications: inflammation of the nose due to an allergy  Patient not taking: Reported on 7/1/2021    Provider, Historical   celecoxib (CELEBREX) 200 mg capsule Take 1 Cap by mouth daily. 2/9/21   Yarely Rodríguez DO   pantoprazole (Protonix) 40 mg tablet Take 1 Tab by mouth daily. Indications: gastroesophageal reflux disease 8/31/20   Joaquina PARKER DO   atorvastatin (LIPITOR) 10 mg tablet Take 1 Tab by mouth daily.  8/31/20   Yarely Rodríguez DO     No Known Allergies   Social History     Tobacco Use    Smoking status: Current Every Day Smoker     Packs/day: 3.00     Years: 59.00     Pack years: 177.00     Types: Cigarettes     Start date: 1/1/1963    Smokeless tobacco: Never Used    Tobacco comment: has been tapering off-- down to . 5ppd   Substance Use Topics    Alcohol use: Yes     Alcohol/week: 14.0 standard drinks     Types: 14 Cans of beer per week      Family History   Problem Relation Age of Onset    Cancer Brother       Review of Systems  A comprehensive review of systems was negative except for that written in the HPI. Objective:     No data found. There were no vitals taken for this visit. General:  Alert, cooperative, no distress, appears stated age. Head:  Normocephalic, without obvious abnormality, atraumatic. Back:   Symmetric, no curvature. ROM normal. No CVA tenderness. Lungs:   Clear to auscultation bilaterally. Chest wall:  No tenderness or deformity. Heart:  Regular rate and rhythm, S1, S2 normal, no murmur, click, rub or gallop. Extremities: Extremities normal, atraumatic, no cyanosis or edema. Pulses: 2+ and symmetric all extremities. Skin: Skin color, texture, turgor normal. No rashes or lesions   Lymph nodes: Cervical, supraclavicular, and axillary nodes normal.   Neurologic: CNII-XII intact. Normal strength, sensation and reflexes throughout. 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 PATIENT HAS EXHAUSTED NON-OPERATIVE MODALITIES     After consideration of risks, benefits and other options for treatment, the patient has consented to surgical intervention.     SEE OFFICE NOTE    Gerald Mercado MD

## 2021-09-08 NOTE — PERIOP NOTES
Patient verified name and . Order for consent NOT found in EHR; patient verified. Type 3 surgery, joint walk-in assessment complete. Labs per surgeon: No orders received at this time of PAT appointment. Labs per anesthesia protocol: cbc, bmp; results pending. T&S DOS; order signed and held in EHR. EKG: None per anesthesia protocol. Patient COVID test completed yesterday; results pending. MRSA/MSSA swab collected; pharmacy to review and dose antibiotic as appropriate. Hospital approved surgical skin cleanser and instructions to return bottle on DOS given per hospital policy. Patient provided with handouts including Guide to Surgery, Pain Management, Hand Hygiene, Blood Transfusion Education, and [facility] Brochure. Patient answered medical/surgical history questions at their best of ability. All prior to admission medications documented in [facility]. Original medication prescription bottle visualized during patient appointment. Patient instructed to hold all vitamins, supplements, herbals (including marijuana), NSAIDs, and ASA starting now. Patient teach back successful and patient demonstrates knowledge of instruction.

## 2021-09-08 NOTE — PERIOP NOTES
PLEASE CONTINUE TAKING ALL PRESCRIPTION MEDICATIONS UP TO THE DAY OF SURGERY UNLESS OTHERWISE DIRECTED BELOW. DISCONTINUE all vitamins, herbals and supplements 21 days prior to surgery. DISCONTINUE Non-Steriodal Anti-Inflammatory (NSAIDS) such as Advil, Ibuprofen, and Aleve 5 days prior to surgery. Home Medications to HOLD      All vitamins, supplements, and herbals stop now. All NSAIDs such as Advil, Aleve, Ibuprofen, Diclofenac, Naproxen, etc. Stop now. Aspirin and Aspirin products stop now. Home Medications to take  the day of surgery   Atorvastatin, Norco if needed, Protonix         Comments      Bring: bottle of soap (Dynahex) and incentive spirometer           Please do not bring home medications with you on the day of surgery unless otherwise directed by your nurse. If you are instructed to bring home medications, please give them to your nurse as they will be administered by the nursing staff. If you have any questions, please call [facility] [phone] or [facility] [phone]. Copy of above instructions given to patient. 21

## 2021-09-08 NOTE — PERIOP NOTES
The below lab results are within anesthesia limits. Recent Results (from the past 12 hour(s))   MSSA/MRSA SC BY PCR, NASAL SWAB    Collection Time: 09/08/21  8:25 AM    Specimen: Nasal swab   Result Value Ref Range    Special Requests: NASAL      Culture result:        SA target not detected. A MRSA NEGATIVE, SA NEGATIVE test result does not preclude MRSA or SA nasal colonization.    CBC W/O DIFF    Collection Time: 09/08/21  8:42 AM   Result Value Ref Range    WBC 9.0 4.3 - 11.1 K/uL    RBC 4.03 (L) 4.23 - 5.6 M/uL    HGB 11.7 (L) 13.6 - 17.2 g/dL    HCT 36.2 (L) 41.1 - 50.3 %    MCV 89.8 79.6 - 97.8 FL    MCH 29.0 26.1 - 32.9 PG    MCHC 32.3 31.4 - 35.0 g/dL    RDW 14.0 11.9 - 14.6 %    PLATELET 768 095 - 386 K/uL    MPV 8.6 (L) 9.4 - 12.3 FL    ABSOLUTE NRBC 0.00 0.0 - 0.2 K/uL   METABOLIC PANEL, BASIC    Collection Time: 09/08/21  8:42 AM   Result Value Ref Range    Sodium 137 136 - 145 mmol/L    Potassium 4.6 3.5 - 5.1 mmol/L    Chloride 103 98 - 107 mmol/L    CO2 30 21 - 32 mmol/L    Anion gap 4 (L) 7 - 16 mmol/L    Glucose 109 (H) 65 - 100 mg/dL    BUN 18 8 - 23 MG/DL    Creatinine 0.82 0.8 - 1.5 MG/DL    GFR est AA >60 >60 ml/min/1.73m2    GFR est non-AA >60 >60 ml/min/1.73m2    Calcium 9.7 8.3 - 10.4 MG/DL

## 2021-09-09 ENCOUNTER — ANESTHESIA (OUTPATIENT)
Dept: SURGERY | Age: 71
End: 2021-09-09
Payer: MEDICARE

## 2021-09-09 ENCOUNTER — APPOINTMENT (OUTPATIENT)
Dept: GENERAL RADIOLOGY | Age: 71
End: 2021-09-09
Attending: ORTHOPAEDIC SURGERY
Payer: MEDICARE

## 2021-09-09 ENCOUNTER — HOSPITAL ENCOUNTER (OUTPATIENT)
Age: 71
Discharge: HOME HEALTH | End: 2021-09-10
Attending: ORTHOPAEDIC SURGERY | Admitting: ORTHOPAEDIC SURGERY
Payer: MEDICARE

## 2021-09-09 DIAGNOSIS — Z96.619 DISLOCATION OF PROSTHETIC JOINT OF SHOULDER, SUBSEQUENT ENCOUNTER: ICD-10-CM

## 2021-09-09 DIAGNOSIS — T84.028D INSTABILITY OF PROSTHETIC SHOULDER JOINT, SUBSEQUENT ENCOUNTER: Primary | ICD-10-CM

## 2021-09-09 DIAGNOSIS — T84.028D DISLOCATION OF PROSTHETIC JOINT OF SHOULDER, SUBSEQUENT ENCOUNTER: ICD-10-CM

## 2021-09-09 DIAGNOSIS — Z96.619 INSTABILITY OF PROSTHETIC SHOULDER JOINT, SUBSEQUENT ENCOUNTER: Primary | ICD-10-CM

## 2021-09-09 PROBLEM — T84.028A INSTABILITY OF PROSTHETIC SHOULDER JOINT (HCC): Status: ACTIVE | Noted: 2021-09-09

## 2021-09-09 LAB
ALBUMIN SERPL-MCNC: 3.5 G/DL (ref 3.2–4.6)
ALBUMIN/GLOB SERPL: 0.8 {RATIO} (ref 1.2–3.5)
ALP SERPL-CCNC: 93 U/L (ref 50–136)
ALT SERPL-CCNC: 27 U/L (ref 12–65)
ANION GAP SERPL CALC-SCNC: 5 MMOL/L (ref 7–16)
APTT PPP: 31.3 SEC (ref 24.1–35.1)
AST SERPL-CCNC: 20 U/L (ref 15–37)
BILIRUB SERPL-MCNC: 0.2 MG/DL (ref 0.2–1.1)
BUN SERPL-MCNC: 14 MG/DL (ref 8–23)
CALCIUM SERPL-MCNC: 9.5 MG/DL (ref 8.3–10.4)
CHLORIDE SERPL-SCNC: 107 MMOL/L (ref 98–107)
CO2 SERPL-SCNC: 25 MMOL/L (ref 21–32)
CREAT SERPL-MCNC: 0.77 MG/DL (ref 0.8–1.5)
CRP SERPL-MCNC: 5 MG/DL (ref 0–0.9)
ERYTHROCYTE [DISTWIDTH] IN BLOOD BY AUTOMATED COUNT: 13.9 % (ref 11.9–14.6)
ERYTHROCYTE [SEDIMENTATION RATE] IN BLOOD: 49 MM/HR (ref 0–20)
EST. AVERAGE GLUCOSE BLD GHB EST-MCNC: 108 MG/DL
GLOBULIN SER CALC-MCNC: 4.3 G/DL (ref 2.3–3.5)
GLUCOSE BLD STRIP.AUTO-MCNC: 108 MG/DL (ref 65–100)
GLUCOSE SERPL-MCNC: 103 MG/DL (ref 65–100)
HBA1C MFR BLD: 5.4 % (ref 4.2–6.3)
HCT VFR BLD AUTO: 37.2 % (ref 41.1–50.3)
HGB BLD-MCNC: 12.4 G/DL (ref 13.6–17.2)
HISTORY CHECKED?,CKHIST: NORMAL
INR PPP: 0.9
MAGNESIUM SERPL-MCNC: 1.9 MG/DL (ref 1.8–2.4)
MCH RBC QN AUTO: 29.6 PG (ref 26.1–32.9)
MCHC RBC AUTO-ENTMCNC: 33.3 G/DL (ref 31.4–35)
MCV RBC AUTO: 88.8 FL (ref 79.6–97.8)
NRBC # BLD: 0 K/UL (ref 0–0.2)
PLATELET # BLD AUTO: 493 K/UL (ref 150–450)
PMV BLD AUTO: 8.3 FL (ref 9.4–12.3)
POTASSIUM SERPL-SCNC: 4.2 MMOL/L (ref 3.5–5.1)
PROT SERPL-MCNC: 7.8 G/DL (ref 6.3–8.2)
PROTHROMBIN TIME: 12.8 SEC (ref 12.6–14.5)
RBC # BLD AUTO: 4.19 M/UL (ref 4.23–5.6)
SERVICE CMNT-IMP: ABNORMAL
SODIUM SERPL-SCNC: 137 MMOL/L (ref 136–145)
WBC # BLD AUTO: 9.5 K/UL (ref 4.3–11.1)

## 2021-09-09 PROCEDURE — 87075 CULTR BACTERIA EXCEPT BLOOD: CPT

## 2021-09-09 PROCEDURE — 86901 BLOOD TYPING SEROLOGIC RH(D): CPT

## 2021-09-09 PROCEDURE — 99218 HC RM OBSERVATION: CPT

## 2021-09-09 PROCEDURE — 77030002986 HC SUT PROL J&J -A: Performed by: ORTHOPAEDIC SURGERY

## 2021-09-09 PROCEDURE — 74011000250 HC RX REV CODE- 250: Performed by: NURSE ANESTHETIST, CERTIFIED REGISTERED

## 2021-09-09 PROCEDURE — 87185 SC STD ENZYME DETCJ PER NZM: CPT

## 2021-09-09 PROCEDURE — 76010010054 HC POST OP PAIN BLOCK: Performed by: ORTHOPAEDIC SURGERY

## 2021-09-09 PROCEDURE — 94762 N-INVAS EAR/PLS OXIMTRY CONT: CPT

## 2021-09-09 PROCEDURE — 86923 COMPATIBILITY TEST ELECTRIC: CPT

## 2021-09-09 PROCEDURE — 2709999900 HC NON-CHARGEABLE SUPPLY: Performed by: ORTHOPAEDIC SURGERY

## 2021-09-09 PROCEDURE — 80053 COMPREHEN METABOLIC PANEL: CPT

## 2021-09-09 PROCEDURE — 77030020268 HC MISC GENERAL SUPPLY: Performed by: ORTHOPAEDIC SURGERY

## 2021-09-09 PROCEDURE — 74011250636 HC RX REV CODE- 250/636: Performed by: NURSE ANESTHETIST, CERTIFIED REGISTERED

## 2021-09-09 PROCEDURE — 76060000034 HC ANESTHESIA 1.5 TO 2 HR: Performed by: ORTHOPAEDIC SURGERY

## 2021-09-09 PROCEDURE — 85652 RBC SED RATE AUTOMATED: CPT

## 2021-09-09 PROCEDURE — 77030040361 HC SLV COMPR DVT MDII -B: Performed by: ORTHOPAEDIC SURGERY

## 2021-09-09 PROCEDURE — 77030031139 HC SUT VCRL2 J&J -A: Performed by: ORTHOPAEDIC SURGERY

## 2021-09-09 PROCEDURE — 74011250636 HC RX REV CODE- 250/636: Performed by: ANESTHESIOLOGY

## 2021-09-09 PROCEDURE — C1776 JOINT DEVICE (IMPLANTABLE): HCPCS | Performed by: ORTHOPAEDIC SURGERY

## 2021-09-09 PROCEDURE — 2709999900 HC NON-CHARGEABLE SUPPLY

## 2021-09-09 PROCEDURE — 77030039425 HC BLD LARYNG TRULITE DISP TELE -A: Performed by: ANESTHESIOLOGY

## 2021-09-09 PROCEDURE — 77030012547: Performed by: ORTHOPAEDIC SURGERY

## 2021-09-09 PROCEDURE — 73030 X-RAY EXAM OF SHOULDER: CPT

## 2021-09-09 PROCEDURE — 87186 SC STD MICRODIL/AGAR DIL: CPT

## 2021-09-09 PROCEDURE — 87205 SMEAR GRAM STAIN: CPT

## 2021-09-09 PROCEDURE — 87070 CULTURE OTHR SPECIMN AEROBIC: CPT

## 2021-09-09 PROCEDURE — 77030003602 HC NDL NRV BLK BBMI -B: Performed by: ANESTHESIOLOGY

## 2021-09-09 PROCEDURE — 74011250637 HC RX REV CODE- 250/637: Performed by: ORTHOPAEDIC SURGERY

## 2021-09-09 PROCEDURE — 83735 ASSAY OF MAGNESIUM: CPT

## 2021-09-09 PROCEDURE — 86140 C-REACTIVE PROTEIN: CPT

## 2021-09-09 PROCEDURE — 87076 CULTURE ANAEROBE IDENT EACH: CPT

## 2021-09-09 PROCEDURE — 76210000002 HC OR PH I REC 3 TO 3.5 HR: Performed by: ORTHOPAEDIC SURGERY

## 2021-09-09 PROCEDURE — 77030035643 HC BLD SAW OSC PRECIS STRY -C: Performed by: ORTHOPAEDIC SURGERY

## 2021-09-09 PROCEDURE — 77030040922 HC BLNKT HYPOTHRM STRY -A: Performed by: ANESTHESIOLOGY

## 2021-09-09 PROCEDURE — 83036 HEMOGLOBIN GLYCOSYLATED A1C: CPT

## 2021-09-09 PROCEDURE — 85610 PROTHROMBIN TIME: CPT

## 2021-09-09 PROCEDURE — 77030011283 HC ELECTRD NDL COVD -A: Performed by: ORTHOPAEDIC SURGERY

## 2021-09-09 PROCEDURE — 77030003827 HC BIT DRL J&J -B: Performed by: ORTHOPAEDIC SURGERY

## 2021-09-09 PROCEDURE — 23474 REVIS RECONST SHOULDER JOINT: CPT | Performed by: ORTHOPAEDIC SURGERY

## 2021-09-09 PROCEDURE — 76010000162 HC OR TIME 1.5 TO 2 HR INTENSV-TIER 1: Performed by: ORTHOPAEDIC SURGERY

## 2021-09-09 PROCEDURE — 82962 GLUCOSE BLOOD TEST: CPT

## 2021-09-09 PROCEDURE — C1713 ANCHOR/SCREW BN/BN,TIS/BN: HCPCS | Performed by: ORTHOPAEDIC SURGERY

## 2021-09-09 PROCEDURE — 76942 ECHO GUIDE FOR BIOPSY: CPT | Performed by: ORTHOPAEDIC SURGERY

## 2021-09-09 PROCEDURE — 85730 THROMBOPLASTIN TIME PARTIAL: CPT

## 2021-09-09 PROCEDURE — 77030037088 HC TUBE ENDOTRACH ORAL NSL COVD-A: Performed by: ANESTHESIOLOGY

## 2021-09-09 PROCEDURE — 85027 COMPLETE CBC AUTOMATED: CPT

## 2021-09-09 DEVICE — IMPLANTABLE DEVICE: Type: IMPLANTABLE DEVICE | Site: SHOULDER | Status: FUNCTIONAL

## 2021-09-09 DEVICE — DELTA XTEND STANDARD HUMERAL PE CUP DIA42 /+3MM STD
Type: IMPLANTABLE DEVICE | Site: SHOULDER | Status: FUNCTIONAL
Brand: DELTA XTEND

## 2021-09-09 DEVICE — DELTA XTEND HUMERAL SPACER / +9MM
Type: IMPLANTABLE DEVICE | Site: SHOULDER | Status: FUNCTIONAL
Brand: DELTA XTEND

## 2021-09-09 RX ORDER — LIDOCAINE HYDROCHLORIDE 10 MG/ML
0.1 INJECTION INFILTRATION; PERINEURAL AS NEEDED
Status: DISCONTINUED | OUTPATIENT
Start: 2021-09-09 | End: 2021-09-09 | Stop reason: HOSPADM

## 2021-09-09 RX ORDER — MIDAZOLAM HYDROCHLORIDE 1 MG/ML
4 INJECTION, SOLUTION INTRAMUSCULAR; INTRAVENOUS ONCE
Status: COMPLETED | OUTPATIENT
Start: 2021-09-09 | End: 2021-09-09

## 2021-09-09 RX ORDER — LIDOCAINE HYDROCHLORIDE 20 MG/ML
INJECTION, SOLUTION EPIDURAL; INFILTRATION; INTRACAUDAL; PERINEURAL AS NEEDED
Status: DISCONTINUED | OUTPATIENT
Start: 2021-09-09 | End: 2021-09-09 | Stop reason: HOSPADM

## 2021-09-09 RX ORDER — SODIUM CHLORIDE 0.9 % (FLUSH) 0.9 %
5-40 SYRINGE (ML) INJECTION EVERY 8 HOURS
Status: DISCONTINUED | OUTPATIENT
Start: 2021-09-09 | End: 2021-09-09 | Stop reason: HOSPADM

## 2021-09-09 RX ORDER — SODIUM CHLORIDE 0.9 % (FLUSH) 0.9 %
5-40 SYRINGE (ML) INJECTION AS NEEDED
Status: DISCONTINUED | OUTPATIENT
Start: 2021-09-09 | End: 2021-09-09 | Stop reason: HOSPADM

## 2021-09-09 RX ORDER — SODIUM CHLORIDE, SODIUM LACTATE, POTASSIUM CHLORIDE, CALCIUM CHLORIDE 600; 310; 30; 20 MG/100ML; MG/100ML; MG/100ML; MG/100ML
75 INJECTION, SOLUTION INTRAVENOUS CONTINUOUS
Status: DISCONTINUED | OUTPATIENT
Start: 2021-09-09 | End: 2021-09-09 | Stop reason: HOSPADM

## 2021-09-09 RX ORDER — SODIUM CHLORIDE 0.9 % (FLUSH) 0.9 %
5-40 SYRINGE (ML) INJECTION AS NEEDED
Status: DISCONTINUED | OUTPATIENT
Start: 2021-09-09 | End: 2021-09-10 | Stop reason: HOSPADM

## 2021-09-09 RX ORDER — PROPOFOL 10 MG/ML
INJECTION, EMULSION INTRAVENOUS AS NEEDED
Status: DISCONTINUED | OUTPATIENT
Start: 2021-09-09 | End: 2021-09-09 | Stop reason: HOSPADM

## 2021-09-09 RX ORDER — NEOSTIGMINE METHYLSULFATE 1 MG/ML
INJECTION, SOLUTION INTRAVENOUS AS NEEDED
Status: DISCONTINUED | OUTPATIENT
Start: 2021-09-09 | End: 2021-09-09 | Stop reason: HOSPADM

## 2021-09-09 RX ORDER — HYDRALAZINE HYDROCHLORIDE 20 MG/ML
10 INJECTION INTRAMUSCULAR; INTRAVENOUS ONCE
Status: DISCONTINUED | OUTPATIENT
Start: 2021-09-09 | End: 2021-09-09 | Stop reason: HOSPADM

## 2021-09-09 RX ORDER — MIDAZOLAM HYDROCHLORIDE 1 MG/ML
2 INJECTION, SOLUTION INTRAMUSCULAR; INTRAVENOUS
Status: DISCONTINUED | OUTPATIENT
Start: 2021-09-09 | End: 2021-09-09 | Stop reason: HOSPADM

## 2021-09-09 RX ORDER — PROMETHAZINE HYDROCHLORIDE 25 MG/1
25 TABLET ORAL
Status: DISCONTINUED | OUTPATIENT
Start: 2021-09-09 | End: 2021-09-10 | Stop reason: HOSPADM

## 2021-09-09 RX ORDER — HYDROCODONE BITARTRATE AND ACETAMINOPHEN 5; 325 MG/1; MG/1
2 TABLET ORAL AS NEEDED
Status: DISCONTINUED | OUTPATIENT
Start: 2021-09-09 | End: 2021-09-09 | Stop reason: HOSPADM

## 2021-09-09 RX ORDER — FENTANYL CITRATE 50 UG/ML
100 INJECTION, SOLUTION INTRAMUSCULAR; INTRAVENOUS ONCE
Status: COMPLETED | OUTPATIENT
Start: 2021-09-09 | End: 2021-09-09

## 2021-09-09 RX ORDER — DEXAMETHASONE SODIUM PHOSPHATE 4 MG/ML
INJECTION, SOLUTION INTRA-ARTICULAR; INTRALESIONAL; INTRAMUSCULAR; INTRAVENOUS; SOFT TISSUE
Status: COMPLETED | OUTPATIENT
Start: 2021-09-09 | End: 2021-09-09

## 2021-09-09 RX ORDER — HYDROMORPHONE HYDROCHLORIDE 1 MG/ML
1 INJECTION, SOLUTION INTRAMUSCULAR; INTRAVENOUS; SUBCUTANEOUS
Status: DISCONTINUED | OUTPATIENT
Start: 2021-09-09 | End: 2021-09-10 | Stop reason: HOSPADM

## 2021-09-09 RX ORDER — SODIUM CHLORIDE 0.9 % (FLUSH) 0.9 %
5-40 SYRINGE (ML) INJECTION EVERY 8 HOURS
Status: DISCONTINUED | OUTPATIENT
Start: 2021-09-09 | End: 2021-09-10 | Stop reason: HOSPADM

## 2021-09-09 RX ORDER — SODIUM CHLORIDE 9 MG/ML
75 INJECTION, SOLUTION INTRAVENOUS CONTINUOUS
Status: DISCONTINUED | OUTPATIENT
Start: 2021-09-09 | End: 2021-09-10 | Stop reason: HOSPADM

## 2021-09-09 RX ORDER — DOCUSATE SODIUM 100 MG/1
100 CAPSULE, LIQUID FILLED ORAL 2 TIMES DAILY
Status: DISCONTINUED | OUTPATIENT
Start: 2021-09-10 | End: 2021-09-10 | Stop reason: HOSPADM

## 2021-09-09 RX ORDER — ONDANSETRON 2 MG/ML
INJECTION INTRAMUSCULAR; INTRAVENOUS AS NEEDED
Status: DISCONTINUED | OUTPATIENT
Start: 2021-09-09 | End: 2021-09-09 | Stop reason: HOSPADM

## 2021-09-09 RX ORDER — FACIAL-BODY WIPES
10 EACH TOPICAL DAILY PRN
Status: DISCONTINUED | OUTPATIENT
Start: 2021-09-09 | End: 2021-09-10 | Stop reason: HOSPADM

## 2021-09-09 RX ORDER — LANOLIN ALCOHOL/MO/W.PET/CERES
1 CREAM (GRAM) TOPICAL 2 TIMES DAILY WITH MEALS
Status: DISCONTINUED | OUTPATIENT
Start: 2021-09-10 | End: 2021-09-10 | Stop reason: HOSPADM

## 2021-09-09 RX ORDER — HYDROMORPHONE HYDROCHLORIDE 2 MG/1
4 TABLET ORAL
Status: DISCONTINUED | OUTPATIENT
Start: 2021-09-09 | End: 2021-09-10 | Stop reason: HOSPADM

## 2021-09-09 RX ORDER — OXYCODONE HYDROCHLORIDE 5 MG/1
5 TABLET ORAL
Status: DISCONTINUED | OUTPATIENT
Start: 2021-09-09 | End: 2021-09-09 | Stop reason: HOSPADM

## 2021-09-09 RX ORDER — DEXAMETHASONE SODIUM PHOSPHATE 100 MG/10ML
INJECTION INTRAMUSCULAR; INTRAVENOUS AS NEEDED
Status: DISCONTINUED | OUTPATIENT
Start: 2021-09-09 | End: 2021-09-09 | Stop reason: HOSPADM

## 2021-09-09 RX ORDER — HYDROMORPHONE HYDROCHLORIDE 2 MG/1
2 TABLET ORAL
Status: DISCONTINUED | OUTPATIENT
Start: 2021-09-09 | End: 2021-09-10 | Stop reason: HOSPADM

## 2021-09-09 RX ORDER — GLYCOPYRROLATE 0.2 MG/ML
INJECTION INTRAMUSCULAR; INTRAVENOUS AS NEEDED
Status: DISCONTINUED | OUTPATIENT
Start: 2021-09-09 | End: 2021-09-09 | Stop reason: HOSPADM

## 2021-09-09 RX ORDER — HYDROMORPHONE HYDROCHLORIDE 1 MG/ML
0.5 INJECTION, SOLUTION INTRAMUSCULAR; INTRAVENOUS; SUBCUTANEOUS
Status: DISCONTINUED | OUTPATIENT
Start: 2021-09-09 | End: 2021-09-09 | Stop reason: HOSPADM

## 2021-09-09 RX ORDER — ROCURONIUM BROMIDE 10 MG/ML
INJECTION, SOLUTION INTRAVENOUS AS NEEDED
Status: DISCONTINUED | OUTPATIENT
Start: 2021-09-09 | End: 2021-09-09 | Stop reason: HOSPADM

## 2021-09-09 RX ADMIN — DEXAMETHASONE SODIUM PHOSPHATE 10 MG: 10 INJECTION INTRAMUSCULAR; INTRAVENOUS at 14:06

## 2021-09-09 RX ADMIN — SODIUM CHLORIDE 1 G: 900 INJECTION, SOLUTION INTRAVENOUS at 14:31

## 2021-09-09 RX ADMIN — ROPIVACAINE HYDROCHLORIDE 30 ML: 5 INJECTION, SOLUTION EPIDURAL; INFILTRATION; PERINEURAL at 12:58

## 2021-09-09 RX ADMIN — Medication 10 ML: at 21:29

## 2021-09-09 RX ADMIN — HYDROMORPHONE HYDROCHLORIDE 4 MG: 2 TABLET ORAL at 21:32

## 2021-09-09 RX ADMIN — PHENYLEPHRINE HYDROCHLORIDE 100 MCG: 10 INJECTION INTRAVENOUS at 14:10

## 2021-09-09 RX ADMIN — FENTANYL CITRATE 50 MCG: 50 INJECTION INTRAMUSCULAR; INTRAVENOUS at 12:57

## 2021-09-09 RX ADMIN — DEXAMETHASONE SODIUM PHOSPHATE 4 MG: 4 INJECTION, SOLUTION INTRAMUSCULAR; INTRAVENOUS at 12:58

## 2021-09-09 RX ADMIN — PROPOFOL 200 MG: 10 INJECTION, EMULSION INTRAVENOUS at 13:51

## 2021-09-09 RX ADMIN — ROCURONIUM BROMIDE 5 MG: 10 INJECTION, SOLUTION INTRAVENOUS at 13:51

## 2021-09-09 RX ADMIN — SODIUM CHLORIDE, SODIUM LACTATE, POTASSIUM CHLORIDE, AND CALCIUM CHLORIDE 75 ML/HR: 600; 310; 30; 20 INJECTION, SOLUTION INTRAVENOUS at 12:39

## 2021-09-09 RX ADMIN — SODIUM CHLORIDE, SODIUM LACTATE, POTASSIUM CHLORIDE, AND CALCIUM CHLORIDE: 600; 310; 30; 20 INJECTION, SOLUTION INTRAVENOUS at 12:40

## 2021-09-09 RX ADMIN — Medication 3 MG: at 15:12

## 2021-09-09 RX ADMIN — GLYCOPYRROLATE 0.4 MG: 0.2 INJECTION, SOLUTION INTRAMUSCULAR; INTRAVENOUS at 15:12

## 2021-09-09 RX ADMIN — ONDANSETRON 4 MG: 2 INJECTION INTRAMUSCULAR; INTRAVENOUS at 14:06

## 2021-09-09 RX ADMIN — LIDOCAINE HYDROCHLORIDE 100 MG: 20 INJECTION, SOLUTION EPIDURAL; INFILTRATION; INTRACAUDAL; PERINEURAL at 13:51

## 2021-09-09 RX ADMIN — PHENYLEPHRINE HYDROCHLORIDE 100 MCG: 10 INJECTION INTRAVENOUS at 14:22

## 2021-09-09 RX ADMIN — PHENYLEPHRINE HYDROCHLORIDE 100 MCG: 10 INJECTION INTRAVENOUS at 14:07

## 2021-09-09 RX ADMIN — PHENYLEPHRINE HYDROCHLORIDE 100 MCG: 10 INJECTION INTRAVENOUS at 14:45

## 2021-09-09 RX ADMIN — PHENYLEPHRINE HYDROCHLORIDE 100 MCG: 10 INJECTION INTRAVENOUS at 14:35

## 2021-09-09 RX ADMIN — MIDAZOLAM 3 MG: 1 INJECTION INTRAMUSCULAR; INTRAVENOUS at 12:57

## 2021-09-09 NOTE — H&P
Date of Surgery Update:  Sadie Abdul was seen and examined. History and physical has been reviewed. The patient has been examined.  There have been no significant clinical changes since the completion of the originally dated History and Physical.    Signed By: Sparkle Casas MD     September 9, 2021 12:21 PM

## 2021-09-09 NOTE — ANESTHESIA PROCEDURE NOTES
Peripheral Block    Start time: 9/9/2021 12:56 PM  End time: 9/9/2021 12:59 PM  Performed by: Aure Reeder MD  Authorized by: Aure Reeder MD       Pre-procedure: Indications: at surgeon's request, post-op pain management and procedure for pain    Preanesthetic Checklist: patient identified, risks and benefits discussed, site marked, timeout performed, anesthesia consent given and patient being monitored    Timeout Time: 12:55 EDT          Block Type:   Block Type: Interscalene  Laterality:  Right  Monitoring:  Standard ASA monitoring, responsive to questions, oxygen, continuous pulse ox, heart rate and frequent vital sign checks  Injection Technique:  Single shot  Procedures: ultrasound guided and nerve stimulator    Patient Position: seated  Prep: chlorhexidine    Location:  Interscalene  Needle Type:  Stimuplex  Needle Gauge:  22 G  Needle Localization:  Nerve stimulator and ultrasound guidance  Motor Response comment:   Motor Response: minimal motor response >0.4 mA   Medication Injected:  Dexamethasone (DECADRON) 4 mg/mL injection, 4 mg  ropivacaine 0.5% with epinephrine 1:200,000 injection, 30 mL (Mixture components: EPINEPHrine HCl (PF) 1 mg/mL (1 mL) Soln, . 005 mL; ropivacaine (PF) 5 mg/mL (0.5 %) Soln, 1 mL)  Med Admin Time: 9/9/2021 12:58 PM    Assessment:  Number of attempts:  1  Injection Assessment:  Incremental injection every 5 mL, no paresthesia, ultrasound image on chart, local visualized surrounding nerve on ultrasound, negative aspiration for blood and no intravascular symptoms  Patient tolerance:  Patient tolerated the procedure well with no immediate complications

## 2021-09-09 NOTE — BRIEF OP NOTE
BRIEF OPERATIVE NOTE    Date of Procedure: 9/9/2021     Preoperative Diagnosis:  PERIPROSTHETIC INSTABILITY S/P REVERSE RIGHT TSA    Postoperative Diagnosis:  SAME    Procedure(s): INCISION, DEBRIDEMENT, REVISION REVERSE RIGHT TOTAL SHOULDER ARTHROPLASTY HUMERAL AND GLENOID COMPONENTS    Surgeon(s) and Role:     * Shweta Mulligan MD - Primary         Assistant Staff:  NONE      Surgical Staff:  Circ-1: Brando Mahoney RN  Scrub Tech-1: Rosa Brown  Scrub Tech-2: Barbara MCDONALD  Scrub Tech-3: Philoemna SIMON  Event Time In   Incision Start 1410   Incision Close        Anesthesia:  GENERAL WITH INTERSCALENE BLOCK    Estimated Blood Loss: 50 CC. Complications: NONE    Implants:   Implant Name Type Inv.  Item Serial No.  Lot No. LRB No. Used Action   COMPONENT GLENOSPHERE XTEND 42MM PLUS 8MM - [id number]  COMPONENT GLENOSPHERE XTEND 42MM PLUS 8MM  [facility] [employer] [facility] [id number] Right 1 Implanted   COMPONENT GLENOSPHERE ECCENTRIC XTEND 38MM PLUS 8MM - [id number]  COMPONENT GLENOSPHERE ECCENTRIC XTEND 38MM PLUS 8MM [id number] [facility] [employer] [facility] [id number] Right 1 Explanted   SPACER HUM +9MM OFFSET SHLDR POLYETH FOR DELT XTEND REV SYS - [id number]  SPACER HUM +9MM OFFSET SHLDR POLYETH FOR DELT XTEND REV SYS  [facility] [employer] [facility] [id number] Right 1 Explanted   CUP HUM [id number] +6MM OFFSET STD SHLDR POLYETH DELT XTEND - [id number]  CUP HUM [id number] +6MM OFFSET STD SHLDR POLYETH DELT XTEND [id number] [facility] [employer] [facility] [id number] Right 1 Explanted   SPACER HUM +9MM OFFSET SHLDR POLYETH FOR DELT XTEND REV SYS - [id number]  SPACER HUM +9MM OFFSET SHLDR POLYETH FOR DELT XTEND REV SYS  [facility] [employer] [facility] [id number] Right 1 Implanted   CUP HUM [id number] +3MM OFFSET STD SHLDR Ron Caros [id number]  CUP HUM [id number] +3MM OFFSET STD SHLDR [facility] [id number] Right 1 Implanted       Keisha Cassidy MD

## 2021-09-09 NOTE — PERIOP NOTES
TRANSFER - OUT REPORT:    Verbal report given to Bess Arriaga on Ruthie Rosen  being transferred to [facility](unit) for routine post - op       Report consisted of patients Situation, Background, Assessment and   Recommendations(SBAR). Information from the following report(s) SBAR, OR Summary and MAR was reviewed with the receiving nurse. Lines:   Peripheral IV 09/09/21 Left;Posterior Forearm (Active)        Opportunity for questions and clarification was provided.       Patient transported with:   O2 @ 2 liters

## 2021-09-09 NOTE — DISCHARGE INSTRUCTIONS
INSTRUCTIONS FOLLOWING ARTHROSCOPY SURGERY  Dr. Marry Harris [phone]    ACTIVITY   As tolerated and as directed by your doctor   Elevate surgery site first 48 hours.  Use arm sling or crutches per your doctors instructions.  Bathe or shower as directed by your doctor. DIET   Clear liquids until no nausea or vomiting; then light diet for the first day   Advance to regular diet on second day, unless your doctor orders otherwise.  If nausea and vomiting continues, call your doctor. PAIN   Take pain medication as directed by your doctor.  Call your doctor if pain is NOT relieved by medication.  DO NOT take aspirin or blood thinners until directed by your doctor. DRESSING CARE: Follow all dressing care instructions provided by Dr. Liya Cruz from Dr Manuel Rasmussen office will call tomorrow with further instructions.  If you have any problems or concerns, call your doctor as needed. CALL YOUR DOCTOR IF   Excessive bleeding that does not stop after holding mild pressure over the area   Temperature of 101°F or above   Redness, excessive swelling or bruising, and/or green or yellow, smelly discharge from incision    AFTER ANESTHESIA   For the next 24 hours: DO NOT Drive, Drink alcoholic beverages, or Make important decisions.  Be aware of dizziness following anesthesia and while taking pain medication.             Cryo Cuff or Iceman 24-48 hours continuously

## 2021-09-09 NOTE — ANESTHESIA PREPROCEDURE EVALUATION
Relevant Problems   NEUROLOGY   (+) Mild episode of recurrent major depressive disorder (HCC)      PERSONAL HX & FAMILY HX OF CANCER   (+) Malignant neoplasm of urinary bladder (HCC)   (+) Prostate cancer (HCC)       Anesthetic History   No history of anesthetic complications            Review of Systems / Medical History  Patient summary reviewed, nursing notes reviewed and pertinent labs reviewed    Pulmonary    COPD: mild      Smoker         Neuro/Psych         Psychiatric history     Cardiovascular    Hypertension: well controlled              Exercise tolerance: >4 METS     GI/Hepatic/Renal     GERD: well controlled           Endo/Other        Arthritis and cancer (bladder cancer, prostate , thyroid s/p thyroidecomy)     Other Findings   Comments: Chronic back pain           Physical Exam    Airway  Mallampati: II  TM Distance: 4 - 6 cm  Neck ROM: normal range of motion   Mouth opening: Normal     Cardiovascular  Regular rate and rhythm,  S1 and S2 normal,  no murmur, click, rub, or gallop  Rhythm: regular  Rate: normal         Dental    Dentition: Poor dentition  Comments:  Only one tooth   Pulmonary  Breath sounds clear to auscultation               Abdominal         Other Findings            Anesthetic Plan    ASA: 3  Anesthesia type: general - supraclavicular block      Post-op pain plan if not by surgeon: peripheral nerve block single    Induction: Intravenous  Anesthetic plan and risks discussed with: Patient

## 2021-09-09 NOTE — PROGRESS NOTES
Vancomycin Consult    MD ordering: Posta   ID following? no  Indication: PERIPROSTHETIC INSTABILITY S/P REVERSE RIGHT TSA  DOT:    days  Goal level(s): 15 - 20    Ht Readings from Last 1 Encounters:   21 5' 7\" (1.702 m)      Wt Readings from Last 1 Encounters:   21 63 kg (138 lb 14.2 oz)         Temp (24hrs), Av °F (36.1 °C), Min:97 °F (36.1 °C), Max:97 °F (36.1 °C)    Dosing weight: 63 kg  Body mass index is 21.75 kg/m². 69 y.o. Date:  Dose/Freq Admin Times Level/Time:    Vanc 1 gram pre-op 1453    9/10 Vanc 750 mg IV q12h (02)  (14)                        Recent Labs     21  1235 21  0842   BUN 14 18   CREA 0.77* 0.82   WBC 9.5 9.0     Estimated Creatinine Clearance: 78.4 mL/min (A) (based on SCr of 0.77 mg/dL (L)). No results found for: Milo Arroyo    Day 1 Assessment and Plan: Will continue with Vancomycin 750 mg IV every 12 hours.     The next dose is due tomorrow at 2 am.    Kandice Dunlap, MasonD, BCPS

## 2021-09-09 NOTE — DISCHARGE SUMMARY
[facility] Discharge Summary      Patient ID:  Truman Keith  [id number]  72 y.o.  1950    Allergies: Patient has no known allergies. Admit date: 9/9/2021    Discharge date and time: 9/10/2021    Admitting Physician: Ollie Catherine MD     Discharge Physician: Ollie Catherine MD      * Admission Diagnoses: Dislocation of prosthetic joint of shoulder, initial encounter [facility]) [J77.539M, [facility] 4; Status post reverse arthroplasty of shoulder, right [Z96.611]; Instability of prosthetic shoulder joint, initial encounter ([facility]; Dislocation of prosthetic shoulder joint ([facility] Lacey Hussein, Z96.619]    * Discharge Diagnoses:   Hospital Problems as of 9/10/2021 Date Reviewed: 9/3/2021        Codes Class Noted - Resolved POA    Instability of prosthetic shoulder joint ([facility] [address] ICD-10-CM: G18.240R, Z96.619  ICD-9-CM: 996.42, V43.61  9/9/2021 - Present Unknown        * (Principal) Dislocation of prosthetic shoulder joint ([facility] [address] ICD-10-CM: F31.920W, D04.210  ICD-9-CM: 996.42, V43.61  9/8/2021 - Present Yes        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          * Discharged to: Home    * Follow-up Care/Discharge instructions:  - Resume pre hospital diet            - Resume home medications per medical continuation form     CONTINUE PHYSICAL THERAPY  SLING RIGHT SHOULDER  - Follow up in office as scheduled       Signed:  Ollie Catherine MD  9/10/2021  3:04 PM

## 2021-09-10 ENCOUNTER — HOME HEALTH ADMISSION (OUTPATIENT)
Dept: HOME HEALTH SERVICES | Facility: HOME HEALTH | Age: 71
End: 2021-09-10
Payer: MEDICARE

## 2021-09-10 VITALS
SYSTOLIC BLOOD PRESSURE: 152 MMHG | DIASTOLIC BLOOD PRESSURE: 84 MMHG | HEART RATE: 80 BPM | WEIGHT: 138.89 LBS | BODY MASS INDEX: 21.8 KG/M2 | TEMPERATURE: 98.4 F | RESPIRATION RATE: 20 BRPM | HEIGHT: 67 IN | OXYGEN SATURATION: 98 %

## 2021-09-10 LAB
ANION GAP SERPL CALC-SCNC: 5 MMOL/L (ref 7–16)
BUN SERPL-MCNC: 12 MG/DL (ref 8–23)
CALCIUM SERPL-MCNC: 9.1 MG/DL (ref 8.3–10.4)
CHLORIDE SERPL-SCNC: 103 MMOL/L (ref 98–107)
CO2 SERPL-SCNC: 29 MMOL/L (ref 21–32)
CREAT SERPL-MCNC: 0.72 MG/DL (ref 0.8–1.5)
ERYTHROCYTE [DISTWIDTH] IN BLOOD BY AUTOMATED COUNT: 13.9 % (ref 11.9–14.6)
GLUCOSE SERPL-MCNC: 136 MG/DL (ref 65–100)
HCT VFR BLD AUTO: 34.8 % (ref 41.1–50.3)
HGB BLD-MCNC: 11.3 G/DL (ref 13.6–17.2)
MAGNESIUM SERPL-MCNC: 2.5 MG/DL (ref 1.8–2.4)
MCH RBC QN AUTO: 29.2 PG (ref 26.1–32.9)
MCHC RBC AUTO-ENTMCNC: 32.5 G/DL (ref 31.4–35)
MCV RBC AUTO: 89.9 FL (ref 79.6–97.8)
NRBC # BLD: 0 K/UL (ref 0–0.2)
PLATELET # BLD AUTO: 403 K/UL (ref 150–450)
PMV BLD AUTO: 8.2 FL (ref 9.4–12.3)
POTASSIUM SERPL-SCNC: 4.4 MMOL/L (ref 3.5–5.1)
RBC # BLD AUTO: 3.87 M/UL (ref 4.23–5.6)
SODIUM SERPL-SCNC: 137 MMOL/L (ref 136–145)
WBC # BLD AUTO: 12.4 K/UL (ref 4.3–11.1)

## 2021-09-10 PROCEDURE — 83735 ASSAY OF MAGNESIUM: CPT

## 2021-09-10 PROCEDURE — 74011250636 HC RX REV CODE- 250/636: Performed by: ORTHOPAEDIC SURGERY

## 2021-09-10 PROCEDURE — 80048 BASIC METABOLIC PNL TOTAL CA: CPT

## 2021-09-10 PROCEDURE — 85027 COMPLETE CBC AUTOMATED: CPT

## 2021-09-10 PROCEDURE — 74011250637 HC RX REV CODE- 250/637: Performed by: ORTHOPAEDIC SURGERY

## 2021-09-10 PROCEDURE — 36415 COLL VENOUS BLD VENIPUNCTURE: CPT

## 2021-09-10 PROCEDURE — 97161 PT EVAL LOW COMPLEX 20 MIN: CPT

## 2021-09-10 PROCEDURE — 99218 HC RM OBSERVATION: CPT

## 2021-09-10 PROCEDURE — 97530 THERAPEUTIC ACTIVITIES: CPT

## 2021-09-10 RX ORDER — DOXYCYCLINE 100 MG/1
100 TABLET ORAL 2 TIMES DAILY
Qty: 40 TABLET | Refills: 0 | Status: SHIPPED | OUTPATIENT
Start: 2021-09-10 | End: 2021-09-30

## 2021-09-10 RX ORDER — HYDROMORPHONE HYDROCHLORIDE 2 MG/1
2 TABLET ORAL
Qty: 40 TABLET | Refills: 0 | Status: SHIPPED | OUTPATIENT
Start: 2021-09-10 | End: 2021-09-17

## 2021-09-10 RX ORDER — PROMETHAZINE HYDROCHLORIDE 25 MG/1
25 TABLET ORAL
Qty: 20 TABLET | Refills: 0 | Status: SHIPPED | OUTPATIENT
Start: 2021-09-10 | End: 2021-09-15

## 2021-09-10 RX ADMIN — HYDROMORPHONE HYDROCHLORIDE 4 MG: 2 TABLET ORAL at 02:28

## 2021-09-10 RX ADMIN — VANCOMYCIN HYDROCHLORIDE 750 MG: 750 INJECTION, POWDER, LYOPHILIZED, FOR SOLUTION INTRAVENOUS at 01:02

## 2021-09-10 RX ADMIN — DOCUSATE SODIUM 100 MG: 100 CAPSULE, LIQUID FILLED ORAL at 08:22

## 2021-09-10 RX ADMIN — FERROUS SULFATE TAB 325 MG (65 MG ELEMENTAL FE) 325 MG: 325 (65 FE) TAB at 08:22

## 2021-09-10 RX ADMIN — HYDROMORPHONE HYDROCHLORIDE 4 MG: 2 TABLET ORAL at 05:59

## 2021-09-10 RX ADMIN — HYDROMORPHONE HYDROCHLORIDE 1 MG: 1 INJECTION, SOLUTION INTRAMUSCULAR; INTRAVENOUS; SUBCUTANEOUS at 00:59

## 2021-09-10 NOTE — PROGRESS NOTES
Care Management Interventions  PCP Verified by CM: Yes  Mode of Transport at Discharge: Self  Transition of Care Consult (CM Consult): [address]: Yes  Physical Therapy Consult: Yes  Support Systems: Spouse/Significant Other  Confirm Follow Up Transport: Family  The Plan for Transition of Care is Related to the Following Treatment Goals : return to independent function  The Patient and/or Patient Representative was Provided with a Choice of Provider and Agrees with the Discharge Plan?: Yes  Freedom of Choice List was Provided with Basic Dialogue that Supports the Patient's Individualized Plan of Care/Goals, Treatment Preferences and Shares the Quality Data Associated with the Providers?: Yes  Discharge Location  Discharge Placement: Home with home health    Order rec'd to arrange home health. Pt w/o preference towards provider. [facility] [place] who [facility] this past July. No other needs.   Baldomero Mcfarlane

## 2021-09-10 NOTE — ANESTHESIA POSTPROCEDURE EVALUATION
Procedure(s):  INCISION, DEBRIDEMENT, REVISION REVERSE RIGHT TOTAL SHOULDER ARTHROPLASTY HUMERAL AND GLENOID COMPONENTS.    general    Anesthesia Post Evaluation      Multimodal analgesia: multimodal analgesia used between 6 hours prior to anesthesia start to PACU discharge  Patient location during evaluation: bedside  Patient participation: complete - patient participated  Level of consciousness: awake and alert  Pain management: adequate  Airway patency: patent  Anesthetic complications: no  Cardiovascular status: acceptable  Respiratory status: acceptable  Hydration status: acceptable  Post anesthesia nausea and vomiting:  controlled  Final Post Anesthesia Temperature Assessment:  Normothermia (36.0-37.5 degrees C)      INITIAL Post-op Vital signs:   Vitals Value Taken Time   /89 09/09/21 1800   Temp 36.1 °C (97 °F) 09/09/21 1525   Pulse 71 09/09/21 1800   Resp 16 09/09/21 1800   SpO2 97 % 09/09/21 1800

## 2021-09-10 NOTE — PROGRESS NOTES
Pt discharged at 1015. All IVs removed. All discharge instructions, medications, and follow up appointments discussed with patient, patient verbalizes understanding. Dressing changed to right shoulder. No complaints voiced by pt. Pt shows no s/sx of distress, active bleeding, or pain. Signed discharge paperwork placed in the chart. Pt awaiting on family transport will notify nursing when family is here.

## 2021-09-10 NOTE — PROGRESS NOTES
Problem: Mobility Impaired (Adult and Pediatric)  Goal: *Acute Goals and Plan of Care (Insert Text)  Outcome: Resolved/Met  Note: ALL GOALS MET 9/10/21 :  (1.)  Patient will move from supine to sit and sit to supine  in bed with INDEPENDENT. (2.)  Patient will transfer from bed to chair and chair to bed with INDEPENDENT using the least restrictive device. (3.)  Patient will ambulate with INDEPENDENT for 300 feet with the least restrictive device. (4.)  Patient will be independent with shoulder HEP to increase range of motion per MD orders. 5) pt independent with stair ambulation x 3 with rail.  ________________________________________________________________________________________________      PHYSICAL THERAPY: Initial Assessment, Discharge, Treatment Day: Day of Assessment, and AM 9/10/2021  OUTPATIENT: Hospital Day: 2  Payor: [facility] Raeford Apley / Plan: [facility] MEDICARE HMO/PPO / Product Type: Managed Care Medicare /      NAME/AGE/GENDER: Tylor Decker is a 69 y.o. male   PRIMARY DIAGNOSIS: Dislocation of prosthetic joint of shoulder, initial encounter ([facility] [T84.028A, Z96.619]  Status post reverse arthroplasty of shoulder, right [Z96.611]  Instability of prosthetic shoulder joint, initial encounter ([facility] [X09.295E, Z96.619]  Dislocation of prosthetic shoulder joint ([facility] [T84.028A, Z96.619] Dislocation of prosthetic shoulder joint (HCC) Dislocation of prosthetic shoulder joint (HCC)  Procedure(s) (LRB):  INCISION, DEBRIDEMENT, REVISION REVERSE RIGHT TOTAL SHOULDER ARTHROPLASTY HUMERAL AND GLENOID COMPONENTS (Right)  1 Day Post-Op  ICD-10: Treatment Diagnosis:   · Pain in Right Shoulder (M25.511)  · Stiffness of Right Shoulder, Not elsewhere classified (M25.611)  · Generalized Muscle Weakness (M62.81)   Precaution/Allergies:  Patient has no known allergies.     MD's RX :    Active and passive range of motion  RIGHT  Elbow hand   NO OTHER SHOULDER MOTION     nwb  shoulder RIGHT   wbat ble   Sling  shoulder RIGHT     ASSESSMENT:     Mr. Puneet Spence presents sore & stiff right UE along generalized weakness from hospitalization & procedure. This pt had a good understanding of HEP with emphasis of NO shoulder motion (all with written guidelines). Pt performed consistently stable gait once warmed up. Pt is safe functionally & understanding of HEP. No further acute PT follow up needed, DC PT. This section established at most recent assessment   PROBLEM LIST (Impairments causing functional limitations):  1. Decreased [place] with Bed Mobility  2. Decreased [place] with Transfers  3. Decreased [place] with Ambulation   4. Decreased [place] with shoulder HEP   INTERVENTIONS PLANNED: (Benefits and precautions of physical therapy have been discussed with the patient.)  1. Bed Mobility Training  2. Transfer Training  3. Gait Training  4. Therapeutic Exercises per MD orders  5. Modalities for Pain     TREATMENT PLAN: Frequency/Duration: Evaluation, treatment & DC  Rehabilitation Potential For Stated Goals: Good     RECOMMENDED REHABILITATION/EQUIPMENT: (at time of discharge pending progress): Continue Skilled Therapy and Outpatient: Physical Therapy. HISTORY:   History of Present Injury/Illness (Reason for Referral):  Right TSA reverse/revision due to dislocation  Past Medical History/Comorbidities:   Mr. Puneet Spence  has a past medical history of Allergic rhinitis, Arthritis, Broken arm (06/18/2021), Cancer ([facility] (2019), Chronic pain, COPD (chronic obstructive pulmonary disease) ([facility] (09/28/2016), Current every day smoker, Hypercholesterolemia, Hypertension, Hypothyroidism (9/28/2016), Long-term use of high-risk medication (8/26/2019), Marijuana user, Osteopenia (9/28/2016), Prostate cancer ([facility] [address] (2016), and Urinary frequency.   Mr. Puneet Spence  has a past surgical history that includes hx partial thyroidectomy (Left); hx hernia repair; hx orthopaedic (Right, 03/25/2021); hx prostatectomy (2016); hx urological; hx orthopaedic (Left, 2021); and hx cataract removal (Bilateral). Social History/Living Environment:   Home Environment: Private residence  # Steps to Enter: 3  Rails to Enter: Yes  Hand Rails : Left  One/Two Story Residence: One story  Living Alone: No  Support Systems: Spouse/Significant Other  Patient Expects to be Discharged to[de-identified] House  Current DME Used/Available at Home: None  Prior Level of Function/Work/Activity:  Pt was independent without an assistive device   Number of Personal Factors/Comorbidities that affect the Plan of Care: 3+: HIGH COMPLEXITY   EXAMINATION:   Most Recent Physical Functioning:   Gross Assessment:  AROM: Within functional limits (left UE & both LE's)  Strength: Within functional limits (left UE & both LE's)  Coordination: Within functional limits (left UE & both LE's)                    Balance:  Sitting: Intact; Without support  Standing: Intact; Without support Bed Mobility:  Supine to Sit: Independent  Sit to Supine:  (NT)  Scooting: Independent       Transfers:  Sit to Stand: Independent  Stand to Sit: Independent  Bed to Chair: Independent  Gait:     Speed/Lilliana: Fluctuations  Step Length:  (equal)  Gait Abnormalities: Decreased step clearance  Distance (ft): 410 Feet (ft)  Assistive Device:  (none)  Number of Stairs Trained: 3  Stairs - Level of Assistance: Independent  Rail Use: Left    Functional Mobility:         Gait/Ambulation:  ind        Transfers:  ind        Bed Mobility:  ind        Stairs:  ind   Body Structures Involved:  1. Joints  2. Muscles Body Functions Affected:  1. Sensory/Pain  2. Movement Related Activities and Participation Affected:  1. General Tasks and Demands  2.  Mobility   Number of elements that affect the Plan of Care: 4+: HIGH COMPLEXITY   CLINICAL PRESENTATION:   Presentation: Stable and uncomplicated: LOW COMPLEXITY   CLINICAL DECISION MAKIN [facility] Inpatient Short Form  How much difficulty does the patient currently have. .. Unable A Lot A Little None   1. Turning over in bed (including adjusting bedclothes, sheets and blankets)? [] 1   [] 2   [] 3   [x] 4   2. Sitting down on and standing up from a chair with arms ( e.g., wheelchair, bedside commode, etc.)   [] 1   [] 2   [] 3   [x] 4   3. Moving from lying on back to sitting on the side of the bed? [] 1   [] 2   [] 3   [x] 4   How much help from another person does the patient currently need. .. Total A Lot A Little None   4. Moving to and from a bed to a chair (including a wheelchair)? [] 1   [] 2   [] 3   [x] 4   5. Need to walk in hospital room? [] 1   [] 2   [] 3   [x] 4   6. Climbing 3-5 steps with a railing? [] 1   [] 2   [] 3   [x] 4   © 2007, Trustees of [address] [place] [address], under license to [employer]. All rights reserved      Score:  Initial: 24 Most Recent: X (Date: -- )    Interpretation of Tool:  Represents activities that are increasingly more difficult (i.e. Bed mobility, Transfers, Gait). Medical Necessity:     · No acute PT follow up. Reason for Services/Other Comments:  · No acute PT follow up. Use of outcome tool(s) and clinical judgement create a POC that gives a: Clear prediction of patient's progress: LOW COMPLEXITY            TREATMENT:   (In addition to Assessment/Re-Assessment sessions the following treatments were rendered)   Pre-treatment Symptoms/Complaints:  none  Pain: Initial: numeric scale  Pain Intensity 1: 2  Pain Location 1: Shoulder  Pain Orientation 1: Right  Pain Intervention(s) 1: Repositioned  Post Session:  2/10     Therapeutic Activity: (    15 min (extra time to work through activity noted):   Therapeutic activities including review of HEP per Rx, review of shoulder precautions, progressive ambulation ( to allow for consistent stable gait)  an additional 350 ft after a 60 ft gait assessment & stair training activity to improve mobility, strength, balance, coordination, and dynamic movement of arm - bilateral and leg - bilateral to improve functional endurance & stability . Assessment     Date:  9/10 Date:   Date:     ACTIVITY/EXERCISE AM PM AM PM AM PM   Gripping 25        Wrist Flexion/Extension 25        Wrist Ulnar/Radial Deviation         Pronation/Supination 25        Elbow Flexion/Extension 25        Shoulder Flexion/Extension NO        Shoulder AB/ADduction         Shoulder IR/ER         Pulleys         Pendulums NO        Shrugs         Isometric:                 Flexion         Extension         ABduction         ADduction         Biceps/Triceps                  B = bilateral; AA = active assistive; A = active; P = passive  Education:  [x]  Home Exercises  [x]  Sling Application   []  Movement Precautions   []  Pulleys   []  Use of Ice   []  Other:   Treatment/Session Assessment:    · Response to Treatment: tolerated well  · Interdisciplinary Collaboration:   o Registered Nurse  · After treatment position/precautions:   o Up in chair  o Bed/Chair-wheels locked  o Call light within reach  o RN notified   · Compliance with Program/Exercises: compliant all of the time. · Recommendations/ DC acute PT services.    · Total Treatment Duration:  PT Patient Time In/Time Out  Time In: 0930  Time Out: [address], PT

## 2021-09-10 NOTE — PROGRESS NOTES
09/09/21 2107   Oxygen Therapy   O2 Sat (%) 97 %   Pulse via Oximetry 76 beats per minute   O2 Device None (Room air)   O2 Flow Rate (L/min)   (RA)   Incentive Spirometry Treatment   Actual Volume (ml) 2000 ml   Number of Attempts 3   Pt connected to c/s monitor . History deleted. Alarms on and functioning- set per protocol. Pt working on IS, encourage to keep practicing. Educated patient about using the [place] for the night, he states he has never had any problems with his breathing or his lung so he feels like he doesn't need it. No distress noted at this time.

## 2021-09-10 NOTE — DISCHARGE SUMMARY
[facility]    Name:  Mari Lombardi  MR#:  [id number]  :  1950  ACCOUNT #:  [de-identified]  ADMIT DATE:  2021  DISCHARGE DATE:  09/10/2021    ADMISSION DIAGNOSIS:  Periprosthetic instability status post reverse right total shoulder arthroplasty. 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 Please see H and P, operative summaries, and consult for details. HOSPITAL COURSE:  The patient is a 79-year-old gentleman who was admitted on 2021, underwent an uncomplicated I and D, revision reverse right total shoulder arthroplasty humeral and glenoid components. On postoperative day #1, he was afebrile. Vital signs were stable. Cultures were no growth to date. He was comfortable. He was discharge home on postoperative day #1. He will be sent home on Dilaudid and doxycycline. He will follow up in my office in 2 weeks.       Gretchen Valdez MD      AP/V_TTRMM_I/  D:  09/10/2021 16:41  T:  09/10/2021 19:09  JOB #:  [id number]  CC:  Hulen Bumpers, MD

## 2021-09-10 NOTE — PROGRESS NOTES
Orthopedic Joint Progress Note    September 10, 2021  Admit Date: 2021  Admit Diagnosis: Dislocation of prosthetic joint of shoulder, initial encounter ([facility] [O27.349Y, [address]; Status post reverse arthroplasty of shoulder, right [Z96.611]; Instability of prosthetic shoulder joint, initial encounter ([facility] [J60.552R, [address]; Dislocation of prosthetic shoulder joint ([facility] [T84.028A, Z96.619]    1 Day Post-Op    Subjective: doing well     Shoaibrejohna Manifold     Review of Systems: Pertinent items are noted in HPI. Objective:     PT/OT:     PATIENT MOBILITY                           Vital Signs:    Blood pressure 129/71, pulse 77, temperature 98.1 °F (36.7 °C), resp. rate 16, height 5' 7\" (1.702 m), weight 138 lb 14.2 oz (63 kg), SpO2 94 %.   Temp (24hrs), Av.8 °F (36.6 °C), Min:97 °F (36.1 °C), Max:98.1 °F (36.7 °C)      Pain Control:   Pain Assessment  Pain Scale 1: Numeric (0 - 10)  Pain Intensity 1: 6  Pain Location 1: Shoulder  Pain Orientation 1: Right  Pain Description 1: Aching, Constant  Pain Intervention(s) 1: Medication (see MAR), Repositioned, Rest    Meds:  Current Facility-Administered Medications   Medication Dose Route Frequency    0.9% sodium chloride infusion  75 mL/hr IntraVENous CONTINUOUS    sodium chloride (NS) flush 5-40 mL  5-40 mL IntraVENous Q8H    sodium chloride (NS) flush 5-40 mL  5-40 mL IntraVENous PRN    bisacodyL (DULCOLAX) suppository 10 mg  10 mg Rectal DAILY PRN    sodium phosphate (FLEET'S) enema 1 Enema  1 Enema Rectal PRN    promethazine (PHENERGAN) tablet 25 mg  25 mg Oral Q4H PRN    docusate sodium (COLACE) capsule 100 mg  100 mg Oral BID    ferrous sulfate tablet 325 mg  1 Tablet Oral BID WITH MEALS    HYDROmorphone (DILAUDID) tablet 4 mg  4 mg Oral Q3H PRN    HYDROmorphone (DILAUDID) tablet 2 mg  2 mg Oral Q3H PRN    HYDROmorphone (DILAUDID) injection 1 mg  1 mg IntraVENous Q1H PRN    vancomycin (VANCOCIN) 750 mg in 0.9% sodium chloride 250 mL (VIAL-MATE) 750 mg IntraVENous Q12H        LAB:    Lab Results   Component Value Date/Time    INR 0.9 09/09/2021 12:35 PM    INR 1.0 07/02/2021 08:12 AM    INR 1.0 06/30/2021 11:31 AM     Lab Results   Component Value Date/Time    HGB 11.3 (L) 09/10/2021 04:16 AM    HGB 12.4 (L) 09/09/2021 12:35 PM    HGB 11.7 (L) 09/08/2021 08:42 AM       Incision 06/24/21 Arm Left (Active)   Number of days: 78       Incision 06/25/21 Shoulder Right (Active)   Number of days: 77       Incision 07/01/21 Shoulder Right (Active)   Number of days: 71       Incision 09/09/21 Shoulder Right (Active)   Dressing Status Clean;Dry; Intact 09/09/21 1847   Cleansed Cleansed with saline 09/09/21 1455   Dressing/Treatment ABD pad;Gauze dressing/dressing sponge;Steri-strips;Roll gauze;Tape/Soft cloth adhesive tape 09/09/21 1455   Number of days: 1       [REMOVED] Incision 03/25/21 Shoulder Right (Removed)   Number of days: 14         Physical Exam:  No significant changes    Assessment:      Principal Problem:    Dislocation of prosthetic shoulder joint ([address] (9/8/2021)    Active Problems:    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          Gwen Roberts MD

## 2021-09-10 NOTE — PROGRESS NOTES
Shift assessment complete. Patient is alert and oriented, in bed. Respirations are even and unlabored. Patient is on room air. Bowel sounds are present. Radial pulse is present, patient reports tingling sensation. Patient is able to  with moderate strength. Will continue to monitor neurovascular status. Right shoulder is wrapped, dressing is clean, dry and intact. Arm is resting in sling. Patient is hemodynamically stable. Patient has voided 600 ml of yellow urine free of sediment. Bed low and locked, call light within reach. No needs expressed at this time.    ___________________________________________________    Problem: Upper Extremity Surgical Pathway: Day of Surgery  Goal: Respiratory  Outcome: Progressing Towards Goal  Goal: Psychosocial  Outcome: Progressing Towards Goal  Goal: *Demonstrates progressive activity  Outcome: Progressing Towards Goal  Goal: *Optimal pain control at patient's stated goal  Outcome: Progressing Towards Goal  Goal: *Hemodynamically stable  Outcome: Progressing Towards Goal

## 2021-09-10 NOTE — OP NOTES
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 PROCEDURE PERFORMED:  Incision, debridement and revision reverse right total shoulder arthroplasty humeral and glenoid components. SURGEON:  Alexandra Toussaint MD        ANESTHESIA:  General with an interscalene block. COMPLICATIONS:  None. SPECIMENS REMOVED:  Cultures were sent from the right shoulder labeled 1, 2, and 3. IMPLANTS:  Hardware utiilized is a 42 standard glenosphere, DePuy 42 +3 cup, and a +9 extender. ESTIMATED BLOOD LOSS:  50 mL. FINDINGS:  Dislocated reverse right total shoulder arthroplasty. CPT CODE:  10995. ICD-10 CODES:  T84.028 and Z95.023. INDICATIONS:  The patient is a 27-year-old gentleman with a complex history. The patient initially underwent an uncomplicated reverse right total shoulder arthroplasty roughly 4 months ago. He fell in the postoperative period sustaining a contralateral left distal radius fracture. He went for operative fixation and following that procedure, it was noted that his right shoulder had dislocated. He underwent a revision 2 months ago without incident. He presented to his postoperative appointment and the shoulder was in good position. He did not return again to my office. He noted that roughly 3-4 weeks ago, he was helping a friend with a car radiator when he felt a pop and right shoulder deformity and the shoulder has been dislocated. He is now brought back to the operative suite for operative revision. PROCEDURE:  Following identification of the patient, the patient was taken to the operative suite.   Following administration of general anesthesia, interscalene block for postop pain control, no antibiotics, and labs that were notable for a white count of 9.5, which was normal, CRP of 5.0, and ESR of 49, the patient was positioned on the operative table in the beach-chair fashion. The right shoulder was then prepped and draped in the sterile fashion. His previous surgical incision was identified and utilized. The skin was incised. Subcutaneous tissue was then dissected down to the deltopectoral interval.  This was developed. It was noted that the prosthesis was dislocated anteriorly. Hematoma was evacuated. There was no purulence. There was some hematoma. Cultures were obtained and labeled 1, 2 and 3 from the right shoulder. The patient then received weight-adjusted vancomycin. At this point, the previous poly and +9 extender were then removed. The humeral stem was well fixed. Again, no evidence of any purulence. At this point, the previous 38 eccentric +8 mm glenosphere was removed. The metaglene was solidly fixed. Again, there was no purulence. At this point, the metaglene was then meticulously exposed. It was elected to place a 42 standard glenosphere, which was done in the standard fashion. The 42 standard glenosphere was stable. Multiple trials were trialed off of this component, and it was noted that a 42 +3 cup with a +9 extender gave the best stability. At this point, all trial components were then removed. A +9 extender with a 42 +3 cup was then inserted onto the humeral component. The shoulder was reduced. There was excellent stability with excellent mobility. At this point, the wound was then irrigated. The deltopectoral interval was approximated with #5 Mersilene sutures. Skin was closed with 0 Vicryl figure-of-eight sutures and a 2-0 Prolene subcuticular stitch. A sterile dressing was applied. A sling and swathe was applied. The patient was transferred to the recovery room in stable condition.       Ema Strickland MD [id number]  D:  09/09/2021 19:28  T:  09/10/2021 4:49  JOB #:  [id number]  CC:   Terry Murray MD

## 2021-09-11 ENCOUNTER — HOME CARE VISIT (OUTPATIENT)
Dept: SCHEDULING | Facility: HOME HEALTH | Age: 71
End: 2021-09-11
Payer: MEDICARE

## 2021-09-11 VITALS
RESPIRATION RATE: 16 BRPM | OXYGEN SATURATION: 99 % | DIASTOLIC BLOOD PRESSURE: 90 MMHG | HEART RATE: 60 BPM | TEMPERATURE: 98.2 F | SYSTOLIC BLOOD PRESSURE: 122 MMHG

## 2021-09-11 PROCEDURE — 400018 HH-NO PAY CLAIM PROCEDURE

## 2021-09-11 PROCEDURE — G0151 HHCP-SERV OF PT,EA 15 MIN: HCPCS

## 2021-09-11 PROCEDURE — 400013 HH SOC

## 2021-09-11 PROCEDURE — 3331090002 HH PPS REVENUE DEBIT

## 2021-09-11 PROCEDURE — 3331090001 HH PPS REVENUE CREDIT

## 2021-09-12 LAB
BACTERIA SPEC CULT: NORMAL
GRAM STN SPEC: NORMAL
SERVICE CMNT-IMP: NORMAL

## 2021-09-12 PROCEDURE — 3331090001 HH PPS REVENUE CREDIT

## 2021-09-12 PROCEDURE — 3331090002 HH PPS REVENUE DEBIT

## 2021-09-13 ENCOUNTER — HOME CARE VISIT (OUTPATIENT)
Dept: SCHEDULING | Facility: HOME HEALTH | Age: 71
End: 2021-09-13
Payer: MEDICARE

## 2021-09-13 PROCEDURE — G0151 HHCP-SERV OF PT,EA 15 MIN: HCPCS

## 2021-09-13 PROCEDURE — 3331090001 HH PPS REVENUE CREDIT

## 2021-09-13 PROCEDURE — 3331090002 HH PPS REVENUE DEBIT

## 2021-09-14 VITALS
DIASTOLIC BLOOD PRESSURE: 74 MMHG | HEART RATE: 93 BPM | OXYGEN SATURATION: 98 % | SYSTOLIC BLOOD PRESSURE: 130 MMHG | TEMPERATURE: 98 F | RESPIRATION RATE: 17 BRPM

## 2021-09-14 PROCEDURE — 3331090001 HH PPS REVENUE CREDIT

## 2021-09-14 PROCEDURE — 3331090002 HH PPS REVENUE DEBIT

## 2021-09-15 PROCEDURE — 3331090002 HH PPS REVENUE DEBIT

## 2021-09-15 PROCEDURE — 3331090001 HH PPS REVENUE CREDIT

## 2021-09-16 ENCOUNTER — HOME CARE VISIT (OUTPATIENT)
Dept: SCHEDULING | Facility: HOME HEALTH | Age: 71
End: 2021-09-16
Payer: MEDICARE

## 2021-09-16 PROCEDURE — G0151 HHCP-SERV OF PT,EA 15 MIN: HCPCS

## 2021-09-16 PROCEDURE — 3331090002 HH PPS REVENUE DEBIT

## 2021-09-16 PROCEDURE — 3331090001 HH PPS REVENUE CREDIT

## 2021-09-17 PROCEDURE — 3331090002 HH PPS REVENUE DEBIT

## 2021-09-17 PROCEDURE — 3331090001 HH PPS REVENUE CREDIT

## 2021-09-18 PROCEDURE — 3331090002 HH PPS REVENUE DEBIT

## 2021-09-18 PROCEDURE — 3331090001 HH PPS REVENUE CREDIT

## 2021-09-19 PROCEDURE — 3331090002 HH PPS REVENUE DEBIT

## 2021-09-19 PROCEDURE — 3331090001 HH PPS REVENUE CREDIT

## 2021-09-20 ENCOUNTER — HOME CARE VISIT (OUTPATIENT)
Dept: SCHEDULING | Facility: HOME HEALTH | Age: 71
End: 2021-09-20
Payer: MEDICARE

## 2021-09-20 VITALS
SYSTOLIC BLOOD PRESSURE: 140 MMHG | DIASTOLIC BLOOD PRESSURE: 88 MMHG | RESPIRATION RATE: 17 BRPM | TEMPERATURE: 97.9 F | HEART RATE: 72 BPM | OXYGEN SATURATION: 98 %

## 2021-09-20 VITALS
DIASTOLIC BLOOD PRESSURE: 76 MMHG | TEMPERATURE: 98 F | HEART RATE: 77 BPM | SYSTOLIC BLOOD PRESSURE: 128 MMHG | RESPIRATION RATE: 19 BRPM | OXYGEN SATURATION: 98 %

## 2021-09-20 PROCEDURE — 3331090001 HH PPS REVENUE CREDIT

## 2021-09-20 PROCEDURE — 3331090002 HH PPS REVENUE DEBIT

## 2021-09-20 PROCEDURE — G0157 HHC PT ASSISTANT EA 15: HCPCS

## 2021-09-21 PROCEDURE — 3331090001 HH PPS REVENUE CREDIT

## 2021-09-21 PROCEDURE — 3331090002 HH PPS REVENUE DEBIT

## 2021-09-22 PROBLEM — Z47.1 AFTERCARE FOLLOWING RIGHT SHOULDER JOINT REPLACEMENT SURGERY: Status: ACTIVE | Noted: 2021-09-22

## 2021-09-22 PROBLEM — Z96.611 AFTERCARE FOLLOWING RIGHT SHOULDER JOINT REPLACEMENT SURGERY: Status: ACTIVE | Noted: 2021-09-22

## 2021-09-22 PROBLEM — Z96.611 PRESENCE OF RIGHT ARTIFICIAL SHOULDER JOINT: Status: ACTIVE | Noted: 2021-09-22

## 2021-09-22 PROCEDURE — 3331090002 HH PPS REVENUE DEBIT

## 2021-09-22 PROCEDURE — 3331090001 HH PPS REVENUE CREDIT

## 2021-09-23 ENCOUNTER — HOME CARE VISIT (OUTPATIENT)
Dept: SCHEDULING | Facility: HOME HEALTH | Age: 71
End: 2021-09-23
Payer: MEDICARE

## 2021-09-23 VITALS
DIASTOLIC BLOOD PRESSURE: 90 MMHG | RESPIRATION RATE: 16 BRPM | HEART RATE: 76 BPM | TEMPERATURE: 98.6 F | SYSTOLIC BLOOD PRESSURE: 140 MMHG

## 2021-09-23 PROCEDURE — G0151 HHCP-SERV OF PT,EA 15 MIN: HCPCS

## 2021-09-23 PROCEDURE — 3331090002 HH PPS REVENUE DEBIT

## 2021-09-23 PROCEDURE — 3331090001 HH PPS REVENUE CREDIT

## 2021-09-24 LAB
BACTERIA SPEC CULT: ABNORMAL
Lab: NORMAL
REFERENCE LAB,REFLB: NORMAL
SERVICE CMNT-IMP: ABNORMAL
TEST DESCRIPTION:,ATST: NORMAL

## 2021-10-05 PROBLEM — Z91.81 AT HIGH RISK FOR FALLS: Status: ACTIVE | Noted: 2021-10-05

## 2021-10-23 PROBLEM — Z28.21 COVID-19 VACCINATION REFUSED: Status: ACTIVE | Noted: 2021-10-23

## 2021-10-23 PROBLEM — M67.431 GANGLION CYST OF WRIST, RIGHT: Status: ACTIVE | Noted: 2021-10-23

## 2021-10-23 PROBLEM — F11.90 OPIOID USE: Status: ACTIVE | Noted: 2021-09-02

## 2021-10-23 PROBLEM — Z28.21 REFUSED INFLUENZA VACCINE: Status: RESOLVED | Noted: 2021-02-21 | Resolved: 2021-10-23

## 2022-03-18 PROBLEM — M51.369 DDD (DEGENERATIVE DISC DISEASE), LUMBAR: Status: ACTIVE | Noted: 2019-09-05

## 2022-03-18 PROBLEM — M51.36 DDD (DEGENERATIVE DISC DISEASE), LUMBAR: Status: ACTIVE | Noted: 2019-09-05

## 2022-03-18 PROBLEM — M67.431 GANGLION CYST OF WRIST, RIGHT: Status: ACTIVE | Noted: 2021-10-23

## 2022-03-19 PROBLEM — M12.811 ROTATOR CUFF TEAR ARTHROPATHY OF RIGHT SHOULDER: Status: ACTIVE | Noted: 2021-03-20

## 2022-03-19 PROBLEM — M54.50 CHRONIC BILATERAL LOW BACK PAIN WITHOUT SCIATICA: Status: ACTIVE | Noted: 2019-08-27

## 2022-03-19 PROBLEM — Z96.611 PRESENCE OF RIGHT ARTIFICIAL SHOULDER JOINT: Status: ACTIVE | Noted: 2021-09-22

## 2022-03-19 PROBLEM — M67.449 MUCOUS CYST OF FINGER: Status: ACTIVE | Noted: 2019-03-20

## 2022-03-19 PROBLEM — M79.642 PAIN IN BOTH HANDS: Status: ACTIVE | Noted: 2021-02-21

## 2022-03-19 PROBLEM — C67.9 MALIGNANT NEOPLASM OF URINARY BLADDER (HCC): Status: ACTIVE | Noted: 2019-11-14

## 2022-03-19 PROBLEM — Z96.619 DISLOCATION OF PROSTHETIC SHOULDER JOINT (HCC): Status: ACTIVE | Noted: 2021-09-08

## 2022-03-19 PROBLEM — F11.90 OPIOID USE: Status: ACTIVE | Noted: 2021-09-02

## 2022-03-19 PROBLEM — T84.028A DISLOCATION OF PROSTHETIC SHOULDER JOINT (HCC): Status: ACTIVE | Noted: 2021-09-08

## 2022-03-19 PROBLEM — Z96.611 INSTABILITY OF REVERSE TOTAL RIGHT SHOULDER ARTHROPLASTY (HCC): Status: ACTIVE | Noted: 2021-09-08

## 2022-03-19 PROBLEM — Z96.619 INSTABILITY OF PROSTHETIC SHOULDER JOINT (HCC): Status: ACTIVE | Noted: 2021-09-09

## 2022-03-19 PROBLEM — R09.89 BRUIT OF LEFT CAROTID ARTERY: Status: ACTIVE | Noted: 2020-08-31

## 2022-03-19 PROBLEM — Z79.899 HIGH RISK MEDICATION USE: Status: ACTIVE | Noted: 2019-08-26

## 2022-03-19 PROBLEM — T84.028A INSTABILITY OF REVERSE TOTAL RIGHT SHOULDER ARTHROPLASTY (HCC): Status: ACTIVE | Noted: 2021-09-08

## 2022-03-19 PROBLEM — Z91.81 AT HIGH RISK FOR FALLS: Status: ACTIVE | Noted: 2021-10-05

## 2022-03-19 PROBLEM — T84.028A INSTABILITY OF PROSTHETIC SHOULDER JOINT (HCC): Status: ACTIVE | Noted: 2021-09-09

## 2022-03-19 PROBLEM — F33.0 MILD EPISODE OF RECURRENT MAJOR DEPRESSIVE DISORDER (HCC): Status: ACTIVE | Noted: 2019-08-28

## 2022-03-19 PROBLEM — G89.29 CHRONIC BILATERAL LOW BACK PAIN WITHOUT SCIATICA: Status: ACTIVE | Noted: 2019-08-27

## 2022-03-19 PROBLEM — Z72.0 TOBACCO ABUSE: Status: ACTIVE | Noted: 2017-09-01

## 2022-03-19 PROBLEM — M79.641 PAIN IN BOTH HANDS: Status: ACTIVE | Noted: 2021-02-21

## 2022-03-19 PROBLEM — M75.101 ROTATOR CUFF TEAR ARTHROPATHY OF RIGHT SHOULDER: Status: ACTIVE | Noted: 2021-03-20

## 2022-03-19 PROBLEM — Z28.21 COVID-19 VACCINATION REFUSED: Status: ACTIVE | Noted: 2021-10-23

## 2022-03-19 PROBLEM — E78.5 DYSLIPIDEMIA: Status: ACTIVE | Noted: 2019-08-26

## 2022-03-20 PROBLEM — R53.1 WEAKNESS GENERALIZED: Status: ACTIVE | Noted: 2020-08-31

## 2022-03-20 PROBLEM — S52.572A OTHER INTRAARTICULAR FRACTURE OF LOWER END OF LEFT RADIUS, INITIAL ENCOUNTER FOR CLOSED FRACTURE: Status: ACTIVE | Noted: 2021-06-22

## 2022-03-20 PROBLEM — Z85.850 HISTORY OF THYROID CANCER: Status: ACTIVE | Noted: 2019-08-27

## 2022-04-07 NOTE — ANESTHESIA PROCEDURE NOTES
Patient needs a new handicap sticker, pt will  the new form.     Was the DOT clearance/paperwork received from [facility].     Please call..       Ok to leave a detailed message     Peripheral Block    Start time: 3/25/2021 3:06 PM  End time: 3/25/2021 3:09 PM  Performed by: Berenice Wray MD  Authorized by: Berenice Wray MD       Pre-procedure: Indications: at surgeon's request and post-op pain management    Preanesthetic Checklist: patient identified, risks and benefits discussed, site marked, timeout performed, anesthesia consent given and patient being monitored    Timeout Time: 15:05          Block Type:   Block Type:   Interscalene  Laterality:  Right  Monitoring:  Continuous pulse ox, frequent vital sign checks, heart rate, responsive to questions and oxygen  Injection Technique:  Single shot  Procedures: ultrasound guided    Prep: chlorhexidine    Location:  Interscalene  Needle Type:  Stimuplex  Needle Gauge:  22 G  Needle Localization:  Anatomical landmarks, infiltration and ultrasound guidance    Assessment:  Number of attempts:  1  Injection Assessment:  Incremental injection every 5 mL, negative aspiration for CSF, local visualized surrounding nerve on ultrasound, negative aspiration for blood, no intravascular symptoms, no paresthesia and ultrasound image on chart  Patient tolerance:  Patient tolerated the procedure well with no immediate complications

## 2022-05-09 DIAGNOSIS — E78.5 DYSLIPIDEMIA: Primary | ICD-10-CM

## 2022-05-09 DIAGNOSIS — Z79.899 HIGH RISK MEDICATION USE: ICD-10-CM

## 2022-05-09 DIAGNOSIS — R53.1 WEAKNESS GENERALIZED: ICD-10-CM

## 2022-10-06 PROBLEM — Z79.899 HIGH RISK MEDICATION USE: Chronic | Status: ACTIVE | Noted: 2019-08-26

## 2022-10-06 PROBLEM — Z86.59 HISTORY OF DEPRESSION: Status: ACTIVE | Noted: 2019-08-28

## 2022-10-06 PROBLEM — E78.5 DYSLIPIDEMIA: Chronic | Status: ACTIVE | Noted: 2019-08-26

## 2022-10-06 PROBLEM — F33.0 MILD EPISODE OF RECURRENT MAJOR DEPRESSIVE DISORDER (HCC): Chronic | Status: ACTIVE | Noted: 2019-08-28

## 2022-10-06 PROBLEM — F11.90 OPIOID USE: Chronic | Status: ACTIVE | Noted: 2021-09-02

## 2022-10-06 PROBLEM — C67.9 MALIGNANT NEOPLASM OF URINARY BLADDER (HCC): Chronic | Status: ACTIVE | Noted: 2019-11-14

## 2022-10-06 PROBLEM — Z72.0 TOBACCO ABUSE: Chronic | Status: ACTIVE | Noted: 2017-09-01

## 2023-09-08 ENCOUNTER — TRANSCRIBE ORDERS (OUTPATIENT)
Dept: SCHEDULING | Age: 73
End: 2023-09-08

## 2023-09-08 DIAGNOSIS — Z12.2 SCREENING FOR LUNG CANCER: Primary | ICD-10-CM

## 2023-09-27 ENCOUNTER — HOSPITAL ENCOUNTER (OUTPATIENT)
Dept: CT IMAGING | Age: 73
Discharge: HOME | End: 2023-09-30
Payer: MEDICARE

## 2023-09-27 DIAGNOSIS — Z12.2 SCREENING FOR LUNG CANCER: ICD-10-CM

## 2023-09-27 PROCEDURE — 71271 CT THORAX LUNG CANCER SCR C-: CPT

## 2024-12-27 ENCOUNTER — HOSPITAL ENCOUNTER (OUTPATIENT)
Dept: MRI IMAGING | Age: 74
Discharge: HOME | End: 2024-12-30
Payer: MEDICARE

## 2024-12-27 DIAGNOSIS — R40.4 TRANSIENT ALTERATION OF AWARENESS: ICD-10-CM

## 2024-12-27 DIAGNOSIS — R51.9 PERSISTENT HEADACHES: ICD-10-CM

## 2024-12-27 PROCEDURE — 70551 MRI BRAIN STEM W/O DYE: CPT

## (undated) DEVICE — BUTTON SWITCH PENCIL BLADE ELECTRODE, HOLSTER: Brand: EDGE

## (undated) DEVICE — PADDING CAST W3INXL4YD COT BLEND MIC PLEAT UNDERCAST SPEC

## (undated) DEVICE — XBRAID #5

## (undated) DEVICE — SHEET, DRAPE, SPLIT, STERILE: Brand: MEDLINE

## (undated) DEVICE — 4.0MM ROUND FAST CUTTING BUR

## (undated) DEVICE — MEDI-VAC YANKAUER SUCTION HANDLE W/BULBOUS TIP: Brand: CARDINAL HEALTH

## (undated) DEVICE — BANDAGE,GAUZE,BULKEE II,4.5"X4.1YD,STRL: Brand: MEDLINE

## (undated) DEVICE — SUTURE PROL SZ 2-0 L18IN NONABSORBABLE BLU FS L26MM 3/8 CIR 8685H

## (undated) DEVICE — GAUZE,SPONGE,USP,4"X4",12PLY,STRL,10/PK: Brand: MEDLINE INDUSTRIES, INC.

## (undated) DEVICE — CARDINAL HEALTH FLEXIBLE LIGHT HANDLE COVER: Brand: CARDINAL HEALTH

## (undated) DEVICE — Device

## (undated) DEVICE — (D)PREP SKN CHLRAPRP APPL 26ML -- CONVERT TO ITEM 371833

## (undated) DEVICE — GUIDEPIN ORTH L190MM DIA2.5MM METAGLENE CTRL FOR DELT XTEND

## (undated) DEVICE — TUBING, SUCTION, 1/4" X 10', STRAIGHT: Brand: MEDLINE

## (undated) DEVICE — DRAPE, FILM SHEET, 44X65 STERILE: Brand: MEDLINE

## (undated) DEVICE — SUTURE ETHBND EXCEL SZ 2 L27IN NONABSORBABLE GRN WHT MO-7 D7485

## (undated) DEVICE — STOCKINETTE TUBE 6X48 -- MEDICHOICE

## (undated) DEVICE — SUTURE NONABSORBABLE BRAIDED 5-0 30 IN ETHBND EXCEL D7809

## (undated) DEVICE — DISPOSABLE MULTI BAG ADAPTERS Y                                    TUBING, STERILE, 2 TO A SET 6 SETS                                    PER BOX

## (undated) DEVICE — OSCILLATING TIP SAW CARTRIDGE: Brand: STRYKER PRECISION

## (undated) DEVICE — ELECTRODE NDL 2.8IN COAT VALLEYLAB

## (undated) DEVICE — DISPOSABLE DRAPE, STERILE, FOR A CDS-3060 5 FOOT TABLE: Brand: PEDIGO PRODUCTS, INC.

## (undated) DEVICE — OSCILLATING TIP SAW CARTRIDGE: Brand: PRECISION FALCON

## (undated) DEVICE — CONTAINER,SPECIMEN,O.R.STRL,4.5OZ: Brand: MEDLINE

## (undated) DEVICE — COVER,TABLE,HEAVY DUTY,79"X110",STRL: Brand: MEDLINE

## (undated) DEVICE — SUTURE VCRL SZ 0 L27IN ABSRB UD L36MM CP-1 1/2 CIR REV CUT J267H

## (undated) DEVICE — SUTURE STRATAFIX SPRL MCRYL + SZ 4-0 L12IN ABSRB UD PS-2 SXMP1B117

## (undated) DEVICE — SUTURE 5 MERS GRN 30 TO 40 IN D9211

## (undated) DEVICE — BANDAGE COMPR SELF ADH 5 YDX4 IN TAN STRL PREMIERPRO LF

## (undated) DEVICE — GUIDEPIN ORTH L300MM DIA1.5MM GLENOSPHERE FOR DELT XTEND

## (undated) DEVICE — DRAPE,HAND,STERILE: Brand: MEDLINE

## (undated) DEVICE — GARMENT,MEDLINE,DVT,INT,CALF,MED, GEN2: Brand: MEDLINE

## (undated) DEVICE — ZIMMER® STERILE DISPOSABLE TOURNIQUET CUFF WITH PROTECTIVE SLEEVE, DUAL PORT, SINGLE BLADDER, 34 IN. (86 CM)

## (undated) DEVICE — ARGYLE SIGMOID SURGICAL SUCTION INSTRUMENT 23 FR/CH (7.7 MM): Brand: ARGYLE

## (undated) DEVICE — GOWN,REINFORCED,POLY,AURORA,XXLARGE,STR: Brand: MEDLINE

## (undated) DEVICE — PREP SKN CHLRAPRP APL 26ML STR --

## (undated) DEVICE — PUDDLEVAC FLOOR SUCTION DEVICE: Brand: PUDDLEVAC

## (undated) DEVICE — HANDPIECE SET WITH COAXIAL HIGH FLOW TIP AND SUCTION TUBE: Brand: INTERPULSE

## (undated) DEVICE — SPONGE LAP 18X18IN STRL -- 5/PK

## (undated) DEVICE — SUTURE N ABSRB BRAIDED 2-0 MO-6 39 IN 26 MM 1/2 CIR BLU BLK 3910900023

## (undated) DEVICE — PAD,ABDOMINAL,5"X9",ST,LF,25/BX: Brand: MEDLINE INDUSTRIES, INC.

## (undated) DEVICE — C-ARM: Brand: UNBRANDED

## (undated) DEVICE — BRAIDED SUTURE

## (undated) DEVICE — TRAY CATH 16F DRN BG LTX -- CONVERT TO ITEM 363158

## (undated) DEVICE — COVER,MAYO STAND,STERILE: Brand: MEDLINE

## (undated) DEVICE — SYRINGE CATH TIP 50ML

## (undated) DEVICE — PADDING CAST W4INXL4YD ST COT COHESIVE HND TEARABLE SPEC

## (undated) DEVICE — SOLUTION IRRIG 3000ML 0.9% SOD CHL FLX CONT 0797208] ICU MEDICAL INC]

## (undated) DEVICE — REM POLYHESIVE ADULT PATIENT RETURN ELECTRODE: Brand: VALLEYLAB

## (undated) DEVICE — SPONGE GZ W4XL4IN COT 12 PLY TYP VII WVN C FLD DSGN

## (undated) DEVICE — SINGLE BASIN: Brand: CARDINAL HEALTH

## (undated) DEVICE — BANDAGE,ELASTIC,ESMARK,STERILE,4"X9',LF: Brand: MEDLINE

## (undated) DEVICE — SUTURE VCRL SZ 4-0 L27IN ABSRB UD L19MM PS-2 3/8 CIR PRIM J426H

## (undated) DEVICE — IMMOBILIZER SHLDR M UNIV 65X17IN BLK LIGHWEIGHT PCH SZ REUSE

## (undated) DEVICE — 3000CC GUARDIAN II: Brand: GUARDIAN

## (undated) DEVICE — TOTAL SHOULDER DR POSTA: Brand: MEDLINE INDUSTRIES, INC.

## (undated) DEVICE — SPLINT MAT XF SPEC 5X30IN --

## (undated) DEVICE — SLING ARM SWTH UNIV FOAM 1 SZ FITS MOST

## (undated) DEVICE — DRAPE TWL SURG 16X26IN BLU ORB04] ALLCARE INC]

## (undated) DEVICE — DYONICS 25 DAY TUBE SET MUST BE                                    USED WITH 7211008, 3 PER BOX

## (undated) DEVICE — PAD COOL W10.9XL11.3IN UNIV REG HOSE WRP ON THER CLD

## (undated) DEVICE — SOLUTION IV 1000ML 0.9% SOD CHL

## (undated) DEVICE — SLING ARM AD ULT

## (undated) DEVICE — STRIP,CLOSURE,WOUND,MEDI-STRIP,1/2X4: Brand: MEDLINE

## (undated) DEVICE — 1.8MM DRILL BIT WITH DEPTH MARK/QC/110MM

## (undated) DEVICE — DRAPE SHT 3 QTR PROXIMA 53X77 --

## (undated) DEVICE — SUTURE N ABSRB MONOFILAMENT 5-0 38 IN BLU FORC FIBER 3910900050

## (undated) DEVICE — OCCLUSIVE GAUZE STRIP,3% BISMUTH TRIBROMOPHENATE IN PETROLATUM BLEND: Brand: XEROFORM

## (undated) DEVICE — DRAPE,TOP,102X53,STERILE: Brand: MEDLINE

## (undated) DEVICE — HAND PACK: Brand: MEDLINE INDUSTRIES, INC.

## (undated) DEVICE — ABDOMINAL PAD: Brand: DERMACEA

## (undated) DEVICE — SOLUTION IRRIG 1000ML H2O STRL BLT

## (undated) DEVICE — SURGICAL PROCEDURE PACK BASIC ST FRANCIS

## (undated) DEVICE — PADDING CAST W6INXL4YD ST COT COHESIVE HND TEARABLE SPEC

## (undated) DEVICE — BLADE SAW SHORT NARROW DISP(6): Brand: DYONICS

## (undated) DEVICE — SPLINT KNEE L20IN FOR 32IN THGH UNIV FOAM 3 PC DSGN TRIMMED

## (undated) DEVICE — ZIMMER® STERILE DISPOSABLE TOURNIQUET CUFF WITH PLC, DUAL PORT, SINGLE BLADDER, 18 IN. (46 CM)

## (undated) DEVICE — PADDING CAST COHESIVE 4 YDX3 IN HND TEARABLE COTTON SPEC 100

## (undated) DEVICE — GUARDIAN LVC: Brand: GUARDIAN

## (undated) DEVICE — STERILE HOOK LOCK LATEX FREE ELASTIC BANDAGE 2INX5YD: Brand: HOOK LOCK™

## (undated) DEVICE — 2000CC GUARDIAN II: Brand: GUARDIAN